# Patient Record
Sex: FEMALE | Race: WHITE | URBAN - METROPOLITAN AREA
[De-identification: names, ages, dates, MRNs, and addresses within clinical notes are randomized per-mention and may not be internally consistent; named-entity substitution may affect disease eponyms.]

---

## 2023-07-31 ENCOUNTER — TRANSCRIBE ORDERS (OUTPATIENT)
Dept: ADMINISTRATIVE | Age: 58
End: 2023-07-31

## 2023-07-31 DIAGNOSIS — C18.2 MALIGNANT NEOPLASM OF ASCENDING COLON (HCC): Primary | ICD-10-CM

## 2023-09-14 ENCOUNTER — HOSPITAL ENCOUNTER (OUTPATIENT)
Dept: CT IMAGING | Age: 58
Discharge: HOME | End: 2023-09-16
Payer: MEDICARE

## 2023-09-14 DIAGNOSIS — C18.9 MALIGNANT NEOPLASM OF COLON, UNSPECIFIED PART OF COLON (HCC): ICD-10-CM

## 2023-09-14 PROCEDURE — 2580000003 HC RX 258: Performed by: RADIOLOGY

## 2023-09-14 PROCEDURE — 74178 CT ABD&PLV WO CNTR FLWD CNTR: CPT

## 2023-09-14 PROCEDURE — 71270 CT THORAX DX C-/C+: CPT

## 2023-09-14 PROCEDURE — 6360000004 HC RX CONTRAST MEDICATION: Performed by: RADIOLOGY

## 2023-09-14 RX ORDER — SODIUM CHLORIDE 0.9 % (FLUSH) 0.9 %
10 SYRINGE (ML) INJECTION
Status: COMPLETED | OUTPATIENT
Start: 2023-09-14 | End: 2023-09-14

## 2023-09-14 RX ADMIN — IOPAMIDOL 18 ML: 755 INJECTION, SOLUTION INTRAVENOUS at 12:14

## 2023-09-14 RX ADMIN — IOPAMIDOL 75 ML: 755 INJECTION, SOLUTION INTRAVENOUS at 12:14

## 2023-09-14 RX ADMIN — Medication 10 ML: at 12:14

## 2023-10-04 ENCOUNTER — TELEPHONE (OUTPATIENT)
Dept: HEMATOLOGY | Age: 58
End: 2023-10-04

## 2023-10-04 NOTE — TELEPHONE ENCOUNTER
We received a referral from [facility], I had Dr Tom Loera review the referral, and we do not see patients for the referral reason. I called [facility] and spoke with Amy Bar to let her know that she can send this referral to Dr Jessenia Chu office and gave her their office number to call.    Electronically signed by Parag Gallagher MA on 10/4/2023 at 10:23 AM

## 2023-10-13 ENCOUNTER — OFFICE VISIT (OUTPATIENT)
Dept: SURGERY | Age: 58
End: 2023-10-13
Payer: MEDICARE

## 2023-10-13 VITALS
SYSTOLIC BLOOD PRESSURE: 85 MMHG | DIASTOLIC BLOOD PRESSURE: 61 MMHG | BODY MASS INDEX: 25.68 KG/M2 | WEIGHT: 136 LBS | HEIGHT: 61 IN | TEMPERATURE: 98 F | HEART RATE: 75 BPM

## 2023-10-13 DIAGNOSIS — K63.2 ENTEROCUTANEOUS FISTULA: Primary | ICD-10-CM

## 2023-10-13 DIAGNOSIS — K50.012 CROHN'S DISEASE OF SMALL INTESTINE WITH INTESTINAL OBSTRUCTION (HCC): ICD-10-CM

## 2023-10-13 PROCEDURE — 4004F PT TOBACCO SCREEN RCVD TLK: CPT | Performed by: STUDENT IN AN ORGANIZED HEALTH CARE EDUCATION/TRAINING PROGRAM

## 2023-10-13 PROCEDURE — G8427 DOCREV CUR MEDS BY ELIG CLIN: HCPCS | Performed by: STUDENT IN AN ORGANIZED HEALTH CARE EDUCATION/TRAINING PROGRAM

## 2023-10-13 PROCEDURE — 99204 OFFICE O/P NEW MOD 45 MIN: CPT | Performed by: STUDENT IN AN ORGANIZED HEALTH CARE EDUCATION/TRAINING PROGRAM

## 2023-10-13 PROCEDURE — G8419 CALC BMI OUT NRM PARAM NOF/U: HCPCS | Performed by: STUDENT IN AN ORGANIZED HEALTH CARE EDUCATION/TRAINING PROGRAM

## 2023-10-13 PROCEDURE — 3017F COLORECTAL CA SCREEN DOC REV: CPT | Performed by: STUDENT IN AN ORGANIZED HEALTH CARE EDUCATION/TRAINING PROGRAM

## 2023-10-13 PROCEDURE — G8484 FLU IMMUNIZE NO ADMIN: HCPCS | Performed by: STUDENT IN AN ORGANIZED HEALTH CARE EDUCATION/TRAINING PROGRAM

## 2023-10-13 ASSESSMENT — ENCOUNTER SYMPTOMS
ABDOMINAL DISTENTION: 0
VOMITING: 0
CHOKING: 0
WHEEZING: 0
ABDOMINAL PAIN: 1
CHEST TIGHTNESS: 0
SHORTNESS OF BREATH: 0
STRIDOR: 0
APNEA: 0
ANAL BLEEDING: 0
DIARRHEA: 0
BLOOD IN STOOL: 0
TROUBLE SWALLOWING: 0
BACK PAIN: 1
CONSTIPATION: 0
COUGH: 0
COLOR CHANGE: 0
NAUSEA: 0

## 2023-10-13 NOTE — PROGRESS NOTES
[facility] Clinic Note    Chief Complaint   Patient presents with    New Patient     Enterocutaneous fistula       PCP: Nahomi Chacko DO    HPI: Madhavi Barrios is a 66 y.o. female who presents in consultation for who present with an enterocutaneous fistula. She has a prolonged history since May 15th. She had an ovarian mass that was removed on 5/15. Two weeks post she came back into the hospital and has septic shock and free air and fluid. She was taken for an ex lap which showed significant amount of succus throughout her abdomen and was found to have small bowel enterotomy and sigmoid colotomy. These were repaired. 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 She has a history of Crohns disease as well and has been off her humera and has not seen GI recently. She would like to get home from her SNF with her TPN but they are having insurance issues. She has a high output fistula based on how much she is changing her dressing. She has an end transverse colotomy which does not procedure anything. She ahs recently been on full liquids. Per the op notes she had her sigmoid repaired along with small bowel resected but due to her right colon having significant damage she has a ileo to transverse colon bypass. The surgeon who performed this is not at the facility anymore either. Past Medical History:   Diagnosis Date    Cancer ([address])     colon    Depression     Fissure, anal     Hernia     Hypertension        Past Surgical History:   Procedure Laterality Date    APPENDECTOMY       SECTION      CHOLECYSTECTOMY      COLON SURGERY      HERNIA REPAIR      OVARY REMOVAL      TUNNELED VENOUS PORT PLACEMENT      also removed       Prior to Admission medications    Medication Sig Start Date End Date Taking?  Authorizing Provider   apixaban (ELIQUIS) 5 MG TABS tablet Take 1 tablet by mouth 2 times daily   Yes Provider, MD Katelyn   metoprolol tartrate

## 2023-12-28 ENCOUNTER — APPOINTMENT (OUTPATIENT)
Dept: CT IMAGING | Age: 58
End: 2023-12-28
Payer: MEDICAID

## 2023-12-28 ENCOUNTER — APPOINTMENT (OUTPATIENT)
Dept: ULTRASOUND IMAGING | Age: 58
End: 2023-12-28
Payer: MEDICAID

## 2023-12-28 ENCOUNTER — APPOINTMENT (OUTPATIENT)
Dept: GENERAL RADIOLOGY | Age: 58
End: 2023-12-28
Payer: MEDICAID

## 2023-12-28 ENCOUNTER — HOSPITAL ENCOUNTER (INPATIENT)
Age: 58
LOS: 8 days | Discharge: OTHER INSTITUTION | End: 2024-01-05
Attending: EMERGENCY MEDICINE | Admitting: INTERNAL MEDICINE
Payer: MEDICAID

## 2023-12-28 DIAGNOSIS — R65.21 SEPTIC SHOCK (HCC): Primary | ICD-10-CM

## 2023-12-28 DIAGNOSIS — R74.01 TRANSAMINITIS: ICD-10-CM

## 2023-12-28 DIAGNOSIS — K50.012 CROHN'S DISEASE OF SMALL INTESTINE WITH INTESTINAL OBSTRUCTION (HCC): ICD-10-CM

## 2023-12-28 DIAGNOSIS — A41.9 SEPTIC SHOCK (HCC): Primary | ICD-10-CM

## 2023-12-28 LAB
ALBUMIN SERPL-MCNC: 3.1 G/DL (ref 3.5–5.2)
ALP SERPL-CCNC: 229 U/L (ref 35–104)
ALT SERPL-CCNC: 134 U/L (ref 0–32)
ANION GAP SERPL CALCULATED.3IONS-SCNC: 14 MMOL/L (ref 7–16)
AST SERPL-CCNC: 149 U/L (ref 0–31)
ATYPICAL LYMPHOCYTE ABSOLUTE COUNT: 0.08 K/UL (ref 0–0.46)
ATYPICAL LYMPHOCYTES: 3 % (ref 0–4)
B PARAP IS1001 DNA NPH QL NAA+NON-PROBE: NOT DETECTED
B PERT DNA SPEC QL NAA+PROBE: NOT DETECTED
BASOPHILS # BLD: 0 K/UL (ref 0–0.2)
BASOPHILS NFR BLD: 0 % (ref 0–2)
BILIRUB DIRECT SERPL-MCNC: 2.6 MG/DL (ref 0–0.3)
BILIRUB SERPL-MCNC: 3.5 MG/DL (ref 0–1.2)
BILIRUB UR QL STRIP: ABNORMAL
BUN SERPL-MCNC: 26 MG/DL (ref 6–20)
C PNEUM DNA NPH QL NAA+NON-PROBE: NOT DETECTED
CALCIUM SERPL-MCNC: 9.3 MG/DL (ref 8.6–10.2)
CHLORIDE SERPL-SCNC: 97 MMOL/L (ref 98–107)
CLARITY UR: CLEAR
CO2 SERPL-SCNC: 21 MMOL/L (ref 22–29)
COLOR UR: YELLOW
COMMENT: ABNORMAL
CREAT SERPL-MCNC: 0.9 MG/DL (ref 0.5–1)
CRP SERPL HS-MCNC: 102 MG/L (ref 0–5)
EOSINOPHIL # BLD: 0 K/UL (ref 0.05–0.5)
EOSINOPHILS RELATIVE PERCENT: 0 % (ref 0–6)
ERYTHROCYTE [DISTWIDTH] IN BLOOD BY AUTOMATED COUNT: 14.3 % (ref 11.5–15)
ERYTHROCYTE [SEDIMENTATION RATE] IN BLOOD BY WESTERGREN METHOD: 18 MM/HR (ref 0–20)
FLUAV RNA NPH QL NAA+NON-PROBE: NOT DETECTED
FLUBV RNA NPH QL NAA+NON-PROBE: NOT DETECTED
GFR SERPL CREATININE-BSD FRML MDRD: >60 ML/MIN/1.73M2
GLUCOSE SERPL-MCNC: 136 MG/DL (ref 74–99)
GLUCOSE UR STRIP-MCNC: NEGATIVE MG/DL
HADV DNA NPH QL NAA+NON-PROBE: NOT DETECTED
HCOV 229E RNA NPH QL NAA+NON-PROBE: NOT DETECTED
HCOV HKU1 RNA NPH QL NAA+NON-PROBE: NOT DETECTED
HCOV NL63 RNA NPH QL NAA+NON-PROBE: NOT DETECTED
HCOV OC43 RNA NPH QL NAA+NON-PROBE: NOT DETECTED
HCT VFR BLD AUTO: 36.1 % (ref 34–48)
HGB BLD-MCNC: 12.1 G/DL (ref 11.5–15.5)
HGB UR QL STRIP.AUTO: NEGATIVE
HMPV RNA NPH QL NAA+NON-PROBE: NOT DETECTED
HPIV1 RNA NPH QL NAA+NON-PROBE: NOT DETECTED
HPIV2 RNA NPH QL NAA+NON-PROBE: NOT DETECTED
HPIV3 RNA NPH QL NAA+NON-PROBE: NOT DETECTED
HPIV4 RNA NPH QL NAA+NON-PROBE: NOT DETECTED
INFLUENZA A BY PCR: NOT DETECTED
INFLUENZA B BY PCR: NOT DETECTED
KETONES UR STRIP-MCNC: NEGATIVE MG/DL
LACTATE BLDV-SCNC: 1.8 MMOL/L (ref 0.5–2.2)
LACTATE BLDV-SCNC: 2.8 MMOL/L (ref 0.5–2.2)
LACTATE BLDV-SCNC: 5.1 MMOL/L (ref 0.5–2.2)
LEUKOCYTE ESTERASE UR QL STRIP: NEGATIVE
LIPASE SERPL-CCNC: 33 U/L (ref 13–60)
LYMPHOCYTES NFR BLD: 0.19 K/UL (ref 1.5–4)
LYMPHOCYTES RELATIVE PERCENT: 6 % (ref 20–42)
M PNEUMO DNA NPH QL NAA+NON-PROBE: NOT DETECTED
MCH RBC QN AUTO: 32.2 PG (ref 26–35)
MCHC RBC AUTO-ENTMCNC: 33.5 G/DL (ref 32–34.5)
MCV RBC AUTO: 96 FL (ref 80–99.9)
MONOCYTES NFR BLD: 0 % (ref 2–12)
MONOCYTES NFR BLD: 0 K/UL (ref 0.1–0.95)
NEUTROPHILS NFR BLD: 91 % (ref 43–80)
NEUTS SEG NFR BLD: 2.83 K/UL (ref 1.8–7.3)
NITRITE UR QL STRIP: NEGATIVE
PH UR STRIP: 5.5 [PH] (ref 5–9)
PLATELET # BLD AUTO: 122 K/UL (ref 130–450)
PMV BLD AUTO: 12.5 FL (ref 7–12)
POTASSIUM SERPL-SCNC: 3.5 MMOL/L (ref 3.5–5)
PROCALCITONIN SERPL-MCNC: 47.11 NG/ML (ref 0–0.08)
PROT SERPL-MCNC: 6.4 G/DL (ref 6.4–8.3)
PROT UR STRIP-MCNC: NEGATIVE MG/DL
RBC # BLD AUTO: 3.76 M/UL (ref 3.5–5.5)
RBC # BLD: ABNORMAL 10*6/UL
RSV RNA NPH QL NAA+NON-PROBE: NOT DETECTED
RV+EV RNA NPH QL NAA+NON-PROBE: NOT DETECTED
SARS-COV-2 RDRP RESP QL NAA+PROBE: NOT DETECTED
SARS-COV-2 RNA NPH QL NAA+NON-PROBE: NOT DETECTED
SODIUM SERPL-SCNC: 132 MMOL/L (ref 132–146)
SP GR UR STRIP: <1.005 (ref 1–1.03)
SPECIMEN DESCRIPTION: NORMAL
SPECIMEN DESCRIPTION: NORMAL
UROBILINOGEN UR STRIP-ACNC: 1 EU/DL (ref 0–1)
WBC # BLD: ABNORMAL 10*3/UL
WBC OTHER # BLD: 3.1 K/UL (ref 4.5–11.5)

## 2023-12-28 PROCEDURE — 2000000000 HC ICU R&B

## 2023-12-28 PROCEDURE — 83690 ASSAY OF LIPASE: CPT

## 2023-12-28 PROCEDURE — 36592 COLLECT BLOOD FROM PICC: CPT

## 2023-12-28 PROCEDURE — 99285 EMERGENCY DEPT VISIT HI MDM: CPT

## 2023-12-28 PROCEDURE — 96375 TX/PRO/DX INJ NEW DRUG ADDON: CPT

## 2023-12-28 PROCEDURE — 71045 X-RAY EXAM CHEST 1 VIEW: CPT

## 2023-12-28 PROCEDURE — 6360000002 HC RX W HCPCS: Performed by: SPECIALIST

## 2023-12-28 PROCEDURE — 02HV33Z INSERTION OF INFUSION DEVICE INTO SUPERIOR VENA CAVA, PERCUTANEOUS APPROACH: ICD-10-PCS | Performed by: SPECIALIST

## 2023-12-28 PROCEDURE — 96366 THER/PROPH/DIAG IV INF ADDON: CPT

## 2023-12-28 PROCEDURE — 96368 THER/DIAG CONCURRENT INF: CPT

## 2023-12-28 PROCEDURE — 87040 BLOOD CULTURE FOR BACTERIA: CPT

## 2023-12-28 PROCEDURE — 87635 SARS-COV-2 COVID-19 AMP PRB: CPT

## 2023-12-28 PROCEDURE — 85025 COMPLETE CBC W/AUTO DIFF WBC: CPT

## 2023-12-28 PROCEDURE — 76705 ECHO EXAM OF ABDOMEN: CPT

## 2023-12-28 PROCEDURE — 2580000003 HC RX 258: Performed by: SPECIALIST

## 2023-12-28 PROCEDURE — 6360000002 HC RX W HCPCS

## 2023-12-28 PROCEDURE — 87081 CULTURE SCREEN ONLY: CPT

## 2023-12-28 PROCEDURE — 2580000003 HC RX 258

## 2023-12-28 PROCEDURE — 85652 RBC SED RATE AUTOMATED: CPT

## 2023-12-28 PROCEDURE — 84145 PROCALCITONIN (PCT): CPT

## 2023-12-28 PROCEDURE — 87077 CULTURE AEROBIC IDENTIFY: CPT

## 2023-12-28 PROCEDURE — 86140 C-REACTIVE PROTEIN: CPT

## 2023-12-28 PROCEDURE — 2580000003 HC RX 258: Performed by: EMERGENCY MEDICINE

## 2023-12-28 PROCEDURE — 82248 BILIRUBIN DIRECT: CPT

## 2023-12-28 PROCEDURE — 96365 THER/PROPH/DIAG IV INF INIT: CPT

## 2023-12-28 PROCEDURE — 81003 URINALYSIS AUTO W/O SCOPE: CPT

## 2023-12-28 PROCEDURE — 87502 INFLUENZA DNA AMP PROBE: CPT

## 2023-12-28 PROCEDURE — 0202U NFCT DS 22 TRGT SARS-COV-2: CPT

## 2023-12-28 PROCEDURE — 87154 CUL TYP ID BLD PTHGN 6+ TRGT: CPT

## 2023-12-28 PROCEDURE — 74177 CT ABD & PELVIS W/CONTRAST: CPT

## 2023-12-28 PROCEDURE — 2580000003 HC RX 258: Performed by: NURSE PRACTITIONER

## 2023-12-28 PROCEDURE — 6370000000 HC RX 637 (ALT 250 FOR IP)

## 2023-12-28 PROCEDURE — 83605 ASSAY OF LACTIC ACID: CPT

## 2023-12-28 PROCEDURE — 82533 TOTAL CORTISOL: CPT

## 2023-12-28 PROCEDURE — 80053 COMPREHEN METABOLIC PANEL: CPT

## 2023-12-28 PROCEDURE — 6360000002 HC RX W HCPCS: Performed by: EMERGENCY MEDICINE

## 2023-12-28 PROCEDURE — 2500000003 HC RX 250 WO HCPCS

## 2023-12-28 PROCEDURE — 6360000004 HC RX CONTRAST MEDICATION: Performed by: RADIOLOGY

## 2023-12-28 RX ORDER — LEVOTHYROXINE SODIUM 0.07 MG/1
75 TABLET ORAL DAILY
COMMUNITY

## 2023-12-28 RX ORDER — ALBUTEROL SULFATE 90 UG/1
2 AEROSOL, METERED RESPIRATORY (INHALATION) EVERY 4 HOURS PRN
COMMUNITY

## 2023-12-28 RX ORDER — LOPERAMIDE HYDROCHLORIDE 2 MG/1
2 CAPSULE ORAL 4 TIMES DAILY
COMMUNITY

## 2023-12-28 RX ORDER — ACETAMINOPHEN 500 MG
1000 TABLET ORAL ONCE
Status: COMPLETED | OUTPATIENT
Start: 2023-12-28 | End: 2023-12-28

## 2023-12-28 RX ORDER — 0.9 % SODIUM CHLORIDE 0.9 %
1000 INTRAVENOUS SOLUTION INTRAVENOUS ONCE
Status: COMPLETED | OUTPATIENT
Start: 2023-12-28 | End: 2023-12-28

## 2023-12-28 RX ORDER — OMEPRAZOLE 20 MG/1
20 CAPSULE, DELAYED RELEASE ORAL 2 TIMES DAILY
COMMUNITY

## 2023-12-28 RX ORDER — ACETAMINOPHEN 500 MG
1000 TABLET ORAL EVERY 6 HOURS PRN
COMMUNITY

## 2023-12-28 RX ORDER — OXYCODONE HYDROCHLORIDE 5 MG/1
5 TABLET ORAL 2 TIMES DAILY
Status: ON HOLD | COMMUNITY
End: 2024-01-04

## 2023-12-28 RX ORDER — ATROPINE SULFATE 0.1 MG/ML
INJECTION INTRAVENOUS
Status: DISPENSED
Start: 2023-12-28 | End: 2023-12-29

## 2023-12-28 RX ORDER — SODIUM CHLORIDE 0.9 % (FLUSH) 0.9 %
5-40 SYRINGE (ML) INJECTION EVERY 12 HOURS SCHEDULED
Status: DISCONTINUED | OUTPATIENT
Start: 2023-12-28 | End: 2024-01-06 | Stop reason: HOSPADM

## 2023-12-28 RX ORDER — 0.9 % SODIUM CHLORIDE 0.9 %
500 INTRAVENOUS SOLUTION INTRAVENOUS ONCE
Status: COMPLETED | OUTPATIENT
Start: 2023-12-28 | End: 2023-12-28

## 2023-12-28 RX ORDER — GLUCAGON 3 MG/1
3 POWDER NASAL PRN
COMMUNITY

## 2023-12-28 RX ORDER — FENTANYL CITRATE 50 UG/ML
25 INJECTION, SOLUTION INTRAMUSCULAR; INTRAVENOUS ONCE
Status: COMPLETED | OUTPATIENT
Start: 2023-12-28 | End: 2023-12-28

## 2023-12-28 RX ORDER — DEXTROSE MONOHYDRATE 25 G/50ML
25 INJECTION, SOLUTION INTRAVENOUS PRN
COMMUNITY

## 2023-12-28 RX ORDER — ONDANSETRON 4 MG/1
4 TABLET, FILM COATED ORAL EVERY 6 HOURS PRN
COMMUNITY

## 2023-12-28 RX ORDER — BUSPIRONE HYDROCHLORIDE 10 MG/1
10 TABLET ORAL 2 TIMES DAILY
COMMUNITY

## 2023-12-28 RX ORDER — MIRTAZAPINE 15 MG/1
15 TABLET, FILM COATED ORAL NIGHTLY
COMMUNITY

## 2023-12-28 RX ORDER — SODIUM CHLORIDE 0.9 % (FLUSH) 0.9 %
5-40 SYRINGE (ML) INJECTION PRN
Status: DISCONTINUED | OUTPATIENT
Start: 2023-12-28 | End: 2024-01-06 | Stop reason: HOSPADM

## 2023-12-28 RX ORDER — SODIUM CHLORIDE 9 MG/ML
INJECTION, SOLUTION INTRAVENOUS CONTINUOUS
Status: DISCONTINUED | OUTPATIENT
Start: 2023-12-28 | End: 2023-12-29

## 2023-12-28 RX ORDER — SODIUM CHLORIDE 9 MG/ML
INJECTION, SOLUTION INTRAVENOUS PRN
Status: DISCONTINUED | OUTPATIENT
Start: 2023-12-28 | End: 2024-01-06 | Stop reason: HOSPADM

## 2023-12-28 RX ORDER — INSULIN ASPART 100 [IU]/ML
0-8 INJECTION, SOLUTION INTRAVENOUS; SUBCUTANEOUS 2 TIMES DAILY
COMMUNITY

## 2023-12-28 RX ADMIN — VANCOMYCIN HYDROCHLORIDE 1500 MG: 10 INJECTION, POWDER, LYOPHILIZED, FOR SOLUTION INTRAVENOUS at 14:39

## 2023-12-28 RX ADMIN — PIPERACILLIN AND TAZOBACTAM 4500 MG: 4; .5 INJECTION, POWDER, LYOPHILIZED, FOR SOLUTION INTRAVENOUS at 16:45

## 2023-12-28 RX ADMIN — SODIUM CHLORIDE: 9 INJECTION, SOLUTION INTRAVENOUS at 20:21

## 2023-12-28 RX ADMIN — NOREPINEPHRINE BITARTRATE 5 MCG/MIN: 1 SOLUTION INTRAVENOUS at 13:31

## 2023-12-28 RX ADMIN — SODIUM CHLORIDE 1000 ML: 9 INJECTION, SOLUTION INTRAVENOUS at 10:29

## 2023-12-28 RX ADMIN — SODIUM CHLORIDE 1000 ML: 9 INJECTION, SOLUTION INTRAVENOUS at 20:36

## 2023-12-28 RX ADMIN — SODIUM CHLORIDE 1000 ML: 9 INJECTION, SOLUTION INTRAVENOUS at 21:53

## 2023-12-28 RX ADMIN — SODIUM CHLORIDE 1000 ML: 9 INJECTION, SOLUTION INTRAVENOUS at 10:38

## 2023-12-28 RX ADMIN — PIPERACILLIN AND TAZOBACTAM 4500 MG: 4; .5 INJECTION, POWDER, FOR SOLUTION INTRAVENOUS at 14:00

## 2023-12-28 RX ADMIN — IOPAMIDOL 75 ML: 755 INJECTION, SOLUTION INTRAVENOUS at 12:22

## 2023-12-28 RX ADMIN — FENTANYL CITRATE 25 MCG: 50 INJECTION, SOLUTION INTRAMUSCULAR; INTRAVENOUS at 14:46

## 2023-12-28 RX ADMIN — ACETAMINOPHEN 1000 MG: 500 TABLET ORAL at 10:30

## 2023-12-28 ASSESSMENT — LIFESTYLE VARIABLES
HOW MANY STANDARD DRINKS CONTAINING ALCOHOL DO YOU HAVE ON A TYPICAL DAY: PATIENT DOES NOT DRINK
HOW OFTEN DO YOU HAVE A DRINK CONTAINING ALCOHOL: NEVER

## 2023-12-28 ASSESSMENT — PAIN SCALES - GENERAL
PAINLEVEL_OUTOF10: 0
PAINLEVEL_OUTOF10: 6
PAINLEVEL_OUTOF10: 0

## 2023-12-28 NOTE — CONSULTS
GENERAL SURGERY  CONSULT NOTE  2023    Physician Consulted: Dr. Hunt  Reason for Consult: Enterocutaneous fistula, fever  Referring Physician: Dr. Tushar ROSENBAUM  Maryam Santana is a 58 y.o. female with history of Crohn's previously on Humira, complex past abdominal surgical history including prior sigmoidectomy, cholecystectomy, prior , bilateral oophorectomy with subsequent small bowel injury requiring takeback and small bowel resection with development of enterocutaneous fistula currently on TPN/clear liquid diet at [facility] who presented to the ED secondary to 1 day of worsening fevers/chills.  Patient states that she noted subjective fever/chills during her TPN dose on .  Patient states that she had recurrent fevers/chills which were worsened by TPN today which prompted her to come to the ED from her nursing facility.  Patient states that she has had associated nausea with 1 episode of emesis of just clear liquid she has drank.  Patient denies any change in her colostomy output.  Patient states that her enterocutaneous fistula output has remained the same despite cutting back to clear liquids.  Patient states that initially the facility was able to pouch her midline fistula but states that recently they have been unable to and have been trying to control it with a dry dressing.  Initial evaluation in the ED demonstrated, BMP with creatinine 0.9, lactic acidosis of 5.1, transaminitis with AST//134, hyperbilirubinemia 3.5, lipase 33, WBC low at 3.1, normal hemoglobin 12.1, thrombocytopenia 122.  Patient underwent right upper quadrant ultrasound which demonstrated absence of gallbladder consistent with cholecystectomy.  Chest x-ray negative for acute process, patient underwent CT of the abdomen pelvis with IV contrast with read currently pending.  Patient was noted to be hypotensive upon arrival to the ED.  Patient was given 2 L IVF bolus upon arrival.  Patient was started on Levophed.   12/28/23 1412   BP: (!) 83/58   Pulse: 81   Resp: 18   Temp:    SpO2: 96%       General Appearance: Awake, alert, ill-appearing  Skin: No rashes/lesions noted  Head/face:  NCAT  Eyes: Scleral icterus noted  Lungs: Normal chest wall expansion bilaterally.  Tolerating room air.  Heart:  Heart regular rate and rhythm  Abdomen: Soft, nondistended, nontender.  Left upper quadrant colostomy with stoma pink/well-perfused with liquid yellow colostomy output noted.  Similar appearing midline fistula output.  Fistulas over the midline x 2 noted supraumbilical with surrounding excoriation.  Extremities: trace pedal edema    LABS:    CBC  Recent Labs     12/28/23  1025   WBC 3.1*   HGB 12.1   HCT 36.1   *     BMP  Recent Labs     12/28/23  1025      K 3.5   CL 97*   CO2 21*   BUN 26*   CREATININE 0.9   CALCIUM 9.3     Liver Function  Recent Labs     12/28/23  1025   LIPASE 33   BILITOT 3.5*   *   *   ALKPHOS 229*   PROT 6.4   LABALBU 3.1*     No results for input(s): \"LACTATE\" in the last 72 hours.  No results for input(s): \"INR\", \"PTT\" in the last 72 hours.    Invalid input(s): \"PT\"    RADIOLOGY    US GALLBLADDER RUQ    Result Date: 12/28/2023  EXAMINATION: RIGHT UPPER QUADRANT ULTRASOUND 12/28/2023 12:05 pm COMPARISON: None. HISTORY: ORDERING SYSTEM PROVIDED HISTORY: transaminitis, elevated bilirubin TECHNOLOGIST PROVIDED HISTORY: Reason for exam:->transaminitis, elevated bilirubin What reading provider will be dictating this exam?->CRC FINDINGS: LIVER:  The liver is mildly enlarged with normal echogenicity without evidence of intrahepatic biliary ductal dilatation. BILIARY SYSTEM:  Gallbladder is surgically absent. Common bile duct is within normal limits measuring 5.3 mm. RIGHT KIDNEY: The right kidney is grossly unremarkable without evidence of hydronephrosis. PANCREAS:  Visualized portions of the pancreas are unremarkable. OTHER: No evidence of right upper quadrant ascites.     Mild

## 2023-12-28 NOTE — ED NOTES
Pt. Has large amount of green bile leaking out of abd. Fistula on abd. Pt. Complaining that bile is burning skin. Area cleaned and absorbant material placed over wound.

## 2023-12-28 NOTE — CONSULTS
Critical Care Admit/Consult Note         Patient - Maryam Santana   MRN -  [id number]   [id number]   - 1965      Date of Admission -  2023  9:52 AM  Date of evaluation -  2023   Hospital Day - 0            ADMIT/CONSULT DETAILS     Reason for Admit/Consult   Septic shock    Consulting Service/Physician   Consulting - Rah Porter DO  Primary Care Physician - Matthew Russell MD         HPI   The patient is a 58 y.o. female from nursing facility with PMHx Crohn's disease on Humira, enteroccutaneous fistula, DMII, complex past abdominal surgical history who presents to the ED with fevers, chills, nausea along with abdominal pain and pain near her PICC line insertion site.     Patient was hospitalized May 2023 for several complications related to partial hysterectomy and returned later for postsurgical abscess.  Patient had underwent abdominal laparotomy with bowel resection end of May.    Patient presented to the ER febrile Tmax 22.6 °F, hypotensive with a BP of 70/40.  Labs significant for WBC 3.1 lactic 5.1, procalcitonin 47.1, , , , total bili 3.5, direct bili 2.6.  Ultrasound gallbladder showed mild hepatomegaly, status postcholecystectomy.  Patient was given 2 L 0.9% bolus, vancomycin and Zosyn.  Levophed was started at 11 mcg.      Past Medical History         Diagnosis Date    Cancer (HCC)     colon    Depression     Fissure, anal     Hernia     Hypertension         Past Surgical History           Procedure Laterality Date    APPENDECTOMY       SECTION      CHOLECYSTECTOMY      COLON SURGERY      HERNIA REPAIR      OVARY REMOVAL      TUNNELED VENOUS PORT PLACEMENT      also removed       Immunization History   Administered Date(s) Administered    Influenza Virus Vaccine 10/05/2015    Pneumococcal, PPSV23, PNEUMOVAX 23, (age 2y+), SC/IM, 0.5mL 10/05/2015       Current Medications   Current Medications    piperacillin-tazobactam  4,500 mg  10:25 AM    EOSABS 0.00 12/28/2023 10:25 AM    BASOSABS 0.00 12/28/2023 10:25 AM       BMP   Lab Results   Component Value Date/Time     12/28/2023 10:25 AM    K 3.5 12/28/2023 10:25 AM    CL 97 12/28/2023 10:25 AM    CO2 21 12/28/2023 10:25 AM    BUN 26 12/28/2023 10:25 AM    CREATININE 0.9 12/28/2023 10:25 AM    GLUCOSE 136 12/28/2023 10:25 AM    CALCIUM 9.3 12/28/2023 10:25 AM       LFTS  Lab Results   Component Value Date/Time    ALKPHOS 229 12/28/2023 10:25 AM     12/28/2023 10:25 AM     12/28/2023 10:25 AM    PROT 6.4 12/28/2023 10:25 AM    BILITOT 3.5 12/28/2023 10:25 AM    BILIDIR 2.6 12/28/2023 10:30 AM    IBILI 0.1 10/06/2015 06:07 AM    LABALBU 3.1 12/28/2023 10:25 AM       INR  No results for input(s): \"PROTIME\", \"INR\" in the last 72 hours.    APTT  No results for input(s): \"APTT\" in the last 72 hours.    Lactic Acid  Lab Results   Component Value Date/Time    LACTA 5.1 12/28/2023 10:25 AM    LACTA 1.1 09/30/2015 09:18 AM    LACTA 2.0 09/29/2015 05:24 PM        BNP   No results for input(s): \"BNP\" in the last 72 hours.     Cultures     No results for input(s): \"BC\" in the last 72 hours.  No results for input(s): \"BLOODCULT2\" in the last 72 hours.    No results for input(s): \"LABURIN\" in the last 72 hours.    Radiology   CXR 12/28/23  No acute process.       CT abdomen/pelvis W IV (12/28/2023)  IMPRESSION:  1. Redemonstration of postsurgical changes related to prior bowel resection  with left-sided ostomy.  2. Small loculated fluid and gas collection located in ventral abdominal  subcutaneous fat along left aspect of surgical scar.  This lesion is slightly  smaller compared to prior from September 14, 2023, yet may indicate residual  or recurrent abscess or fistula.  Repeat CT with oral contrast may be helpful  for further evaluation.  Clinical correlation recommended.  3. Diverticulosis.  4. No acute process in the abdomen or pelvis.    ASSESSMENT    Principal Problem:    Septic shock

## 2023-12-28 NOTE — ED PROVIDER NOTES
Department of Emergency Medicine     Written by: Tushar Barroso DO  Patient Name: Maryam Santana  Admit Date: 2023  9:52 AM  MRN: [id number]                   : 1965      HPI  Chief Complaint   Patient presents with    Fever     This is a 58-year-old female with past medical history of Crohn's disease, enterocutaneous fistula, depression, colon cancer, hypertension who presents to the emergency department today complaining of fever.  Patient currently lives at a nursing facility where they states she had a fever of 101 °F yesterday.  Patient receives TPN patient following her extensive history of intestinal and stomach issues.  She states yesterday when they gave her her TPN, she became very cold and got chills.  This is when her temperature was measured at 101.  States she did vomit 1 time after receiving TPN yesterday.  She then started to feel a bit better.  This morning when she got her TPN, she got the cold feeling again however did not throw up.  Nursing facility sent her in for evaluation.  Patient denies any lightheadedness or dizziness, fever or chills, chest pain, shortness of breath, hematuria or dysuria, constipation or diarrhea.    Nursing notes were all reviewed and agreed with or any disagreements were addressed in the HPI.    Review of systems:    Pertinent positives and negatives mentioned in the HPI/MDM.     Physical Exam  Constitutional:       General: She is not in acute distress.  HENT:      Head: Normocephalic and atraumatic.   Eyes:      Extraocular Movements: Extraocular movements intact.      Pupils: Pupils are equal, round, and reactive to light.   Cardiovascular:      Rate and Rhythm: Normal rate and regular rhythm.   Pulmonary:      Effort: Pulmonary effort is normal.      Breath sounds: Normal breath sounds. No stridor. No wheezing, rhonchi or rales.   Abdominal:      General: Abdomen is flat. There is no distension.      Palpations: Abdomen is soft.      Tenderness: There  Determinants: Patient has good medical compliance and fluency as well as established follow-up with family physician.    ED Course as of 12/28/23 1702   Thu Dec 28, 2023   1051 SEP-1 CORE MEASURE DATA      Sepsis Criteria   Severe Sepsis Criteria   Septic Shock Criteria      Must meet 2:    [x ]Temp >100.9 F (38.3 C) or < 96.8 F (36 C)  [ ]HR > 90  [x ]RR > 20  [ x]WBC > 12 or < 4 or 10% bands    AND:    [x ] Infection Confirmed or Suspected.     Must meet 1:    [ ]Lactate > 2       or   [x ]Signs of Organ Dysfunction:    - SBP < 90 or MAP < 65  -Creatinine > 2 or increased from baseline  -Urine Output < 0.5 ml/kg/hr  -Bilirubin > 2  -INR > 1.5 (not anticoagulated)  -Platelets < 100,000  -Acute Respiratory Failure as evidenced by new need for NIPPV or mechanical ventilation   Must meet 1:    [ ]Lactate > 4        or   [ ]SBP < 90 or MAP < 65 for at least two readings in the first hour after fluid bolus administration    [ ]Vasopressors initiated (if hypotension persists after fluid resuscitation)  @IPVITALS([id number]:1::1:0)@  @LABRCNT(WBC:3,LACTA:3,LACTACIDWB:3,CREATININE:3,BILITOT:3,INR:3,PLT:3)@    Severe sepsis identified date: 12/28 time: 10:40AM    Fluid Resuscitation Rationale: at least 30mL/kg based on entered actual weight at time of triage    Repeat lactate level: ordered and pending at this time    Reassessment Exam: Not applicable. Patient does not have septic shock.   [CF]      ED Course User Index  [CF] Ace Bailey DO         ------------------------- NURSING NOTES AND VITALS REVIEWED ---------------------------  Date / Time Roomed:  12/28/2023  9:52 AM  ED Bed Assignment:  01/01    The nursing notes within the ED encounter and vital signs as below have been reviewed.   Patient Vitals for the past 24 hrs:   BP Temp Temp src Pulse Resp SpO2 Height Weight   12/28/23 1657 (!) 80/59 -- -- 73 18 100 % -- --   12/28/23 1652 (!) 88/61 -- -- 79 30 99 % -- --   12/28/23 1647 (!) 89/70 -- -- 76 22 99 %

## 2023-12-28 NOTE — CONSULTS
[facility]  [facility]  Consultation Note     Admit Date: 2023  9:52 AM    Reason for Consult:   Concern for infected PICC line    Attending Physician:  Ace Bailey DO    HISTORY OF PRESENT ILLNESS:             The history is obtained from extensive review of available past medical records. The patient is a 58 y.o. female who is unknown to the ID service.  The patient has a history of Crohn disease and is currently on Humira..  She has had surgeries.  She has a history of an enterocutaneous fistula and is currently on TPN.  She presented to the ED at [facility] on 2023 with fevers and chills.  On presentation he had a temperature 102.6 °F and was hypotensive. Rapid SARS-CoV-2 and influenza were negative.  White count is 3.1.  ALT was 134 and AST is 149.  Bilirubin is 3.5.    Past Medical History:        Diagnosis Date    Cancer (HCC)     colon    Depression     Fissure, anal     Hernia     Hypertension      Past Surgical History:        Procedure Laterality Date    APPENDECTOMY       SECTION      CHOLECYSTECTOMY      COLON SURGERY      HERNIA REPAIR      OVARY REMOVAL      TUNNELED VENOUS PORT PLACEMENT      also removed     Current Medications:   Scheduled Meds:   vancomycin  25 mg/kg IntraVENous Once     Continuous Infusions:   norepinephrine 9 mcg/min (23 1441)     PRN Meds:    Allergies:  Dilaudid [hydromorphone hcl], Cocoa, Lactose, Lyrica [pregabalin], Phenergan [promethazine hcl], and Phenytoin sodium extended    Social History:   Social History     Socioeconomic History    Marital status:    Tobacco Use    Smoking status: Some Days   Substance and Sexual Activity    Alcohol use: No    Drug use: No     Social Determinants of Health     Food Insecurity: No Food Insecurity (2023)    Hunger Vital Sign     Worried About Running Out of Food in the Last Year: Never true     Ran Out of Food in the Last Year: Never true

## 2023-12-28 NOTE — PROGRESS NOTES
Database initiated. Patient is A&O comes in from [place]. States she is independent. Has a Picc line in her right arm for TPN. Has a colostomy which she says has increased and turned to watery output. She wants to go home with  after discharge and not back to this facility.

## 2023-12-29 ENCOUNTER — APPOINTMENT (OUTPATIENT)
Dept: ULTRASOUND IMAGING | Age: 58
End: 2023-12-29
Payer: MEDICAID

## 2023-12-29 ENCOUNTER — APPOINTMENT (OUTPATIENT)
Dept: GENERAL RADIOLOGY | Age: 58
End: 2023-12-29
Payer: MEDICAID

## 2023-12-29 PROBLEM — K63.2 ENTEROCUTANEOUS FISTULA: Status: ACTIVE | Noted: 2023-12-29

## 2023-12-29 LAB
ALBUMIN SERPL-MCNC: 2.5 G/DL (ref 3.5–5.2)
ALP SERPL-CCNC: 127 U/L (ref 35–104)
ALT SERPL-CCNC: 100 U/L (ref 0–32)
ANION GAP SERPL CALCULATED.3IONS-SCNC: 8 MMOL/L (ref 7–16)
AST SERPL-CCNC: 142 U/L (ref 0–31)
ATYPICAL LYMPHOCYTE ABSOLUTE COUNT: 0.14 K/UL (ref 0–0.46)
ATYPICAL LYMPHOCYTES: 2 % (ref 0–4)
BASOPHILS # BLD: 0 K/UL (ref 0–0.2)
BASOPHILS NFR BLD: 0 % (ref 0–2)
BILIRUB DIRECT SERPL-MCNC: 3.1 MG/DL (ref 0–0.3)
BILIRUB INDIRECT SERPL-MCNC: 0.5 MG/DL (ref 0–1)
BILIRUB SERPL-MCNC: 3.6 MG/DL (ref 0–1.2)
BILIRUB SERPL-MCNC: 3.6 MG/DL (ref 0–1.2)
BUN SERPL-MCNC: 17 MG/DL (ref 6–20)
CALCIUM SERPL-MCNC: 7.8 MG/DL (ref 8.6–10.2)
CEA SERPL-MCNC: 1.6 NG/ML (ref 0–5.2)
CHLORIDE SERPL-SCNC: 113 MMOL/L (ref 98–107)
CO2 SERPL-SCNC: 19 MMOL/L (ref 22–29)
CORTIS SERPL-MCNC: 18.3 UG/DL (ref 2.7–18.4)
CREAT SERPL-MCNC: 0.7 MG/DL (ref 0.5–1)
EOSINOPHIL # BLD: 0.07 K/UL (ref 0.05–0.5)
EOSINOPHILS RELATIVE PERCENT: 1 % (ref 0–6)
ERYTHROCYTE [DISTWIDTH] IN BLOOD BY AUTOMATED COUNT: 14.7 % (ref 11.5–15)
FERRITIN SERPL-MCNC: 2336 NG/ML
GFR SERPL CREATININE-BSD FRML MDRD: >60 ML/MIN/1.73M2
GLUCOSE SERPL-MCNC: 80 MG/DL (ref 74–99)
HCT VFR BLD AUTO: 30 % (ref 34–48)
HGB BLD-MCNC: 9.9 G/DL (ref 11.5–15.5)
INR PPP: 1.2
IRON SATN MFR SERPL: ABNORMAL % (ref 15–50)
IRON SERPL-MCNC: 16 UG/DL (ref 37–145)
LACTATE BLDV-SCNC: 1.1 MMOL/L (ref 0.5–2.2)
LYMPHOCYTES NFR BLD: 0.57 K/UL (ref 1.5–4)
LYMPHOCYTES RELATIVE PERCENT: 7 % (ref 20–42)
MAGNESIUM SERPL-MCNC: 1.6 MG/DL (ref 1.6–2.6)
MCH RBC QN AUTO: 32.2 PG (ref 26–35)
MCHC RBC AUTO-ENTMCNC: 33 G/DL (ref 32–34.5)
MCV RBC AUTO: 97.7 FL (ref 80–99.9)
MONOCYTES NFR BLD: 0.07 K/UL (ref 0.1–0.95)
MONOCYTES NFR BLD: 1 % (ref 2–12)
NEUTROPHILS NFR BLD: 89 % (ref 43–80)
NEUTS SEG NFR BLD: 7.24 K/UL (ref 1.8–7.3)
PHOSPHATE SERPL-MCNC: 2.3 MG/DL (ref 2.5–4.5)
PLATELET # BLD AUTO: 107 K/UL (ref 130–450)
PMV BLD AUTO: 12.3 FL (ref 7–12)
POTASSIUM SERPL-SCNC: 3.5 MMOL/L (ref 3.5–5)
PROT SERPL-MCNC: 5 G/DL (ref 6.4–8.3)
PROTHROMBIN TIME: 13.7 SEC (ref 9.3–12.4)
RBC # BLD AUTO: 3.07 M/UL (ref 3.5–5.5)
RBC # BLD: ABNORMAL 10*6/UL
SODIUM SERPL-SCNC: 140 MMOL/L (ref 132–146)
T4 SERPL-MCNC: 4.3 UG/DL (ref 4.5–11.7)
TIBC SERPL-MCNC: 117 UG/DL (ref 250–450)
TSH SERPL DL<=0.05 MIU/L-ACNC: 5.51 UIU/ML (ref 0.27–4.2)
WBC OTHER # BLD: 8.1 K/UL (ref 4.5–11.5)

## 2023-12-29 PROCEDURE — 2580000003 HC RX 258: Performed by: INTERNAL MEDICINE

## 2023-12-29 PROCEDURE — 2580000003 HC RX 258: Performed by: NURSE PRACTITIONER

## 2023-12-29 PROCEDURE — 71045 X-RAY EXAM CHEST 1 VIEW: CPT

## 2023-12-29 PROCEDURE — 84450 TRANSFERASE (AST) (SGOT): CPT

## 2023-12-29 PROCEDURE — 84520 ASSAY OF UREA NITROGEN: CPT

## 2023-12-29 PROCEDURE — 83883 ASSAY NEPHELOMETRY NOT SPEC: CPT

## 2023-12-29 PROCEDURE — 82977 ASSAY OF GGT: CPT

## 2023-12-29 PROCEDURE — 6360000002 HC RX W HCPCS: Performed by: INTERNAL MEDICINE

## 2023-12-29 PROCEDURE — 84100 ASSAY OF PHOSPHORUS: CPT

## 2023-12-29 PROCEDURE — 2580000003 HC RX 258: Performed by: SPECIALIST

## 2023-12-29 PROCEDURE — 82103 ALPHA-1-ANTITRYPSIN TOTAL: CPT

## 2023-12-29 PROCEDURE — 87040 BLOOD CULTURE FOR BACTERIA: CPT

## 2023-12-29 PROCEDURE — 6360000002 HC RX W HCPCS: Performed by: SPECIALIST

## 2023-12-29 PROCEDURE — 80074 ACUTE HEPATITIS PANEL: CPT

## 2023-12-29 PROCEDURE — 83735 ASSAY OF MAGNESIUM: CPT

## 2023-12-29 PROCEDURE — 86255 FLUORESCENT ANTIBODY SCREEN: CPT

## 2023-12-29 PROCEDURE — 84443 ASSAY THYROID STIM HORMONE: CPT

## 2023-12-29 PROCEDURE — 6360000002 HC RX W HCPCS: Performed by: NURSE PRACTITIONER

## 2023-12-29 PROCEDURE — 82728 ASSAY OF FERRITIN: CPT

## 2023-12-29 PROCEDURE — 84436 ASSAY OF TOTAL THYROXINE: CPT

## 2023-12-29 PROCEDURE — 80053 COMPREHEN METABOLIC PANEL: CPT

## 2023-12-29 PROCEDURE — A4216 STERILE WATER/SALINE, 10 ML: HCPCS

## 2023-12-29 PROCEDURE — 87071 CULTURE AEROBIC QUANT OTHER: CPT

## 2023-12-29 PROCEDURE — 36556 INSERT NON-TUNNEL CV CATH: CPT

## 2023-12-29 PROCEDURE — 86039 ANTINUCLEAR ANTIBODIES (ANA): CPT

## 2023-12-29 PROCEDURE — 82248 BILIRUBIN DIRECT: CPT

## 2023-12-29 PROCEDURE — 83605 ASSAY OF LACTIC ACID: CPT

## 2023-12-29 PROCEDURE — 82784 ASSAY IGA/IGD/IGG/IGM EACH: CPT

## 2023-12-29 PROCEDURE — 84460 ALANINE AMINO (ALT) (SGPT): CPT

## 2023-12-29 PROCEDURE — 2500000003 HC RX 250 WO HCPCS: Performed by: NURSE PRACTITIONER

## 2023-12-29 PROCEDURE — 2580000003 HC RX 258

## 2023-12-29 PROCEDURE — 2000000000 HC ICU R&B

## 2023-12-29 PROCEDURE — 6360000002 HC RX W HCPCS

## 2023-12-29 PROCEDURE — 85610 PROTHROMBIN TIME: CPT

## 2023-12-29 PROCEDURE — C9113 INJ PANTOPRAZOLE SODIUM, VIA: HCPCS

## 2023-12-29 PROCEDURE — 82105 ALPHA-FETOPROTEIN SERUM: CPT

## 2023-12-29 PROCEDURE — 36592 COLLECT BLOOD FROM PICC: CPT

## 2023-12-29 PROCEDURE — 87077 CULTURE AEROBIC IDENTIFY: CPT

## 2023-12-29 PROCEDURE — 86038 ANTINUCLEAR ANTIBODIES: CPT

## 2023-12-29 PROCEDURE — 36415 COLL VENOUS BLD VENIPUNCTURE: CPT

## 2023-12-29 PROCEDURE — 82378 CARCINOEMBRYONIC ANTIGEN: CPT

## 2023-12-29 PROCEDURE — 2500000003 HC RX 250 WO HCPCS: Performed by: INTERNAL MEDICINE

## 2023-12-29 PROCEDURE — 85025 COMPLETE CBC W/AUTO DIFF WBC: CPT

## 2023-12-29 PROCEDURE — 86403 PARTICLE AGGLUT ANTBDY SCRN: CPT

## 2023-12-29 PROCEDURE — 83550 IRON BINDING TEST: CPT

## 2023-12-29 PROCEDURE — 83540 ASSAY OF IRON: CPT

## 2023-12-29 RX ORDER — MAGNESIUM SULFATE IN WATER 40 MG/ML
2000 INJECTION, SOLUTION INTRAVENOUS ONCE
Status: COMPLETED | OUTPATIENT
Start: 2023-12-29 | End: 2023-12-29

## 2023-12-29 RX ORDER — SODIUM CHLORIDE, SODIUM LACTATE, POTASSIUM CHLORIDE, CALCIUM CHLORIDE 600; 310; 30; 20 MG/100ML; MG/100ML; MG/100ML; MG/100ML
INJECTION, SOLUTION INTRAVENOUS CONTINUOUS
Status: DISCONTINUED | OUTPATIENT
Start: 2023-12-29 | End: 2024-01-06 | Stop reason: HOSPADM

## 2023-12-29 RX ORDER — FENTANYL CITRATE 50 UG/ML
50 INJECTION, SOLUTION INTRAMUSCULAR; INTRAVENOUS ONCE
Status: COMPLETED | OUTPATIENT
Start: 2023-12-29 | End: 2023-12-29

## 2023-12-29 RX ORDER — POTASSIUM CHLORIDE 7.45 MG/ML
10 INJECTION INTRAVENOUS
Status: COMPLETED | OUTPATIENT
Start: 2023-12-29 | End: 2023-12-29

## 2023-12-29 RX ORDER — ENOXAPARIN SODIUM 100 MG/ML
40 INJECTION SUBCUTANEOUS DAILY
Status: DISCONTINUED | OUTPATIENT
Start: 2023-12-29 | End: 2024-01-05

## 2023-12-29 RX ADMIN — SODIUM CHLORIDE, POTASSIUM CHLORIDE, SODIUM LACTATE AND CALCIUM CHLORIDE: 600; 310; 30; 20 INJECTION, SOLUTION INTRAVENOUS at 20:40

## 2023-12-29 RX ADMIN — POTASSIUM CHLORIDE: 2 INJECTION, SOLUTION, CONCENTRATE INTRAVENOUS at 17:59

## 2023-12-29 RX ADMIN — SODIUM CHLORIDE, POTASSIUM CHLORIDE, SODIUM LACTATE AND CALCIUM CHLORIDE: 600; 310; 30; 20 INJECTION, SOLUTION INTRAVENOUS at 13:36

## 2023-12-29 RX ADMIN — PIPERACILLIN AND TAZOBACTAM 4500 MG: 4; .5 INJECTION, POWDER, LYOPHILIZED, FOR SOLUTION INTRAVENOUS at 00:50

## 2023-12-29 RX ADMIN — SOYBEAN OIL 250 ML: 20 INJECTION, SOLUTION INTRAVENOUS at 17:59

## 2023-12-29 RX ADMIN — PIPERACILLIN AND TAZOBACTAM 4500 MG: 4; .5 INJECTION, POWDER, LYOPHILIZED, FOR SOLUTION INTRAVENOUS at 08:37

## 2023-12-29 RX ADMIN — POTASSIUM CHLORIDE 10 MEQ: 7.46 INJECTION, SOLUTION INTRAVENOUS at 07:40

## 2023-12-29 RX ADMIN — MAGNESIUM SULFATE HEPTAHYDRATE 2000 MG: 40 INJECTION, SOLUTION INTRAVENOUS at 07:44

## 2023-12-29 RX ADMIN — ENOXAPARIN SODIUM 40 MG: 100 INJECTION SUBCUTANEOUS at 14:57

## 2023-12-29 RX ADMIN — SODIUM CHLORIDE, PRESERVATIVE FREE 40 MG: 5 INJECTION INTRAVENOUS at 14:57

## 2023-12-29 RX ADMIN — PIPERACILLIN AND TAZOBACTAM 4500 MG: 4; .5 INJECTION, POWDER, LYOPHILIZED, FOR SOLUTION INTRAVENOUS at 17:02

## 2023-12-29 RX ADMIN — VANCOMYCIN HYDROCHLORIDE 750 MG: 5 INJECTION, POWDER, LYOPHILIZED, FOR SOLUTION INTRAVENOUS at 02:40

## 2023-12-29 RX ADMIN — SODIUM CHLORIDE: 9 INJECTION, SOLUTION INTRAVENOUS at 02:38

## 2023-12-29 RX ADMIN — FENTANYL CITRATE 50 MCG: 50 INJECTION, SOLUTION INTRAMUSCULAR; INTRAVENOUS at 17:04

## 2023-12-29 RX ADMIN — Medication 10 ML: at 08:31

## 2023-12-29 RX ADMIN — POTASSIUM CHLORIDE 10 MEQ: 7.46 INJECTION, SOLUTION INTRAVENOUS at 08:29

## 2023-12-29 RX ADMIN — POTASSIUM PHOSPHATE, MONOBASIC POTASSIUM PHOSPHATE, DIBASIC 10 MMOL: 224; 236 INJECTION, SOLUTION, CONCENTRATE INTRAVENOUS at 08:28

## 2023-12-29 ASSESSMENT — PAIN SCALES - GENERAL
PAINLEVEL_OUTOF10: 7
PAINLEVEL_OUTOF10: 0
PAINLEVEL_OUTOF10: 4
PAINLEVEL_OUTOF10: 0
PAINLEVEL_OUTOF10: 4
PAINLEVEL_OUTOF10: 4

## 2023-12-29 ASSESSMENT — PAIN DESCRIPTION - LOCATION: LOCATION: ABDOMEN;NECK

## 2023-12-29 NOTE — PROGRESS NOTES
GENERAL SURGERY  DAILY PROGRESS NOTE    Patient's Name/Date of Birth: Maryam Santana / 1965    Date: 2023     Chief Complaint   Patient presents with    Fever        Subjective:    States her pain is improving.  Denies any nausea/vomiting.  Not feeling hungry, but would like some ice chips.     No recorded output for fistula in chart, minimal drainage of fistula noted on exam.     Objective:  Last 24Hrs  Temp  Av.4 °F (37.4 °C)  Min: 98.2 °F (36.8 °C)  Max: 102.6 °F (39.2 °C)  Resp  Av.6  Min: 13  Max: 31  Pulse  Av.1  Min: 40  Max: 93  Systolic (24hrs), Av , Min:62 , Max:176     Diastolic (24hrs), Av, Min:40, Max:121    SpO2  Av.7 %  Min: 95 %  Max: 100 %    I/O last 3 completed shifts:  In: 2656.4 [I.V.:472; IV Piggyback:2184.4]  Out: 700 [Urine:600; Stool:100]      General: Alert and oriented x4  Cardiovascular: Warm throughout, no edema  Respiratory: no respiratory distress, equal chest rise  Abdomen: soft, Soft, nondistended, nontender. Left upper quadrant colostomy with stoma pink/well-perfused. colostomy output noted.  Similar appearing midline fistula output.  Fistulas over the midline x 2 noted supraumbilical with surrounding excoriation.   Skin: no obvious rashes or lesions appreciated, no jaundice  Extremities: atraumatic, no focal motor deficits, no open wounds      CBC  Recent Labs     23  1025 23  0410   WBC 3.1* 8.1   RBC 3.76 3.07*   HGB 12.1 9.9*   HCT 36.1 30.0*   MCV 96.0 97.7   MCH 32.2 32.2   MCHC 33.5 33.0   RDW 14.3 14.7   * 107*   MPV 12.5* 12.3*       CMP  Recent Labs     23  1025 23  0410    140   K 3.5 3.5   CL 97* 113*   CO2 21* 19*   BUN 26* 17   CREATININE 0.9 0.7   GLUCOSE 136* 80   CALCIUM 9.3 7.8*   PROT 6.4 5.0*   LABALBU 3.1* 2.5*   BILITOT 3.5* 3.6*   ALKPHOS 229* 127*   * 142*   * 100*         Assessment/Plan:    Patient Active Problem List   Diagnosis    Ventral hernia with bowel  obstruction    Crohn's disease (HCC)    History of colon cancer    Depression    Crohn's disease of small intestine with intestinal obstruction (HCC)    Septic shock (HCC)     58 y.o. female with complex past abdominal surgical history and Crohn's with enterocutaneous fistula x 2 currently admitted with septic shock       -Consult wound care/enterostomal nurse for recommendations regarding possible pouching of midline fistulas and for wound care of surrounding skin; appreciate wound care recs  -Strict I's and O's of fistula output.  Strict I's and O's colostomy output  -Able to continue TPN from surgical point of view.    -Patient was previously on TPN ordered by medical team at facility.  Will defer to primary medical team for management.  -Keep patient NPO okay for sips of water with meds  -Appreciate critical care recs    Edilberto Tim DO  General Surgery Resident, PGY-1    Electronically signed on 12/29/2023 at 7:24 AM

## 2023-12-29 NOTE — PROGRESS NOTES
Critical Care Team - Daily Progress Note         Date and time: 12/29/2023 9:22 AM  Patient's name:  Maryam Santana  Medical Record Number: [id number]  Patient's account/billing number: [id number]  Patient's YOB: 1965  Age: 58 y.o.  Date of Admission: 12/28/2023  9:52 AM  Length of stay during current admission: 1      Primary Care Physician: Matthew Russell MD  ICU Attending Physician:      Code Status: Prior    Reason for ICU admission: Severe Sepsis, Septic Shock       SUBJECTIVE:     The patient is a 58 y.o. female from nursing facility with PMHx Crohn's disease on Humira, enteroccutaneous fistula, DMII, complex past abdominal surgical history who presents to the ED with fevers, chills, nausea along with abdominal pain and pain near her PICC line insertion site.      Patient was hospitalized May 2023 for several complications related to partial hysterectomy and returned later for postsurgical abscess.  Patient had underwent abdominal laparotomy with bowel resection end of May.     Patient presented to the ER febrile Tmax 22.6 °F, hypotensive with a BP of 70/40.  Labs significant for WBC 3.1 lactic 5.1, procalcitonin 47.1, , , , total bili 3.5, direct bili 2.6.  Ultrasound gallbladder showed mild hepatomegaly, status postcholecystectomy.  Patient was given 2 L 0.9% bolus, vancomycin and Zosyn.  Levophed was started at 11 mcg.    12/29: off pressors    CURRENT VENTILATION STATUS:     [] Ventilator  [] BIPAP  [x] Nasal Cannula [] Room Air      IF INTUBATED, ET TUBE MARKING AT LOWER LIP:       cms    SECRETIONS Amount:  [] Small [] Moderate  [] Large  [x] None  Color:     [] White [] Colored  [] Bloody    SEDATION:  RAAS Score:  [] Propofol gtt  [] Versed gtt  [] Ativan gtt   [x] No Sedation    PARALYZED:  [x] No    [] Yes    VASOPRESSORS:  [x] No    [] Yes    If yes -   [] Levophed       [] Dopamine     [] Vasopressin       [] Dobutamine  [] Phenylephrine

## 2023-12-29 NOTE — H&P
patients with extremes of muscle mass, extra-renal  metabolism of creatine, excessive creatine ingestion, or following therapy that affects  renal tubular secretion.       Calcium 7.8 Low  mg/dL    Total Protein 5.0 Low  g/dL    Albumin 2.5 Low  g/dL    Total Bilirubin 3.6 High  mg/dL    Alkaline Phosphatase 127 High  U/L     High  U/L     High  U/L   Magnesium [[id number]]    Collected: 12/29/23 0410    Updated: 12/29/23 0627    Specimen Type: Blood     Magnesium 1.6 mg/dL   Phosphorus [[id number]] (Abnormal)    Collected: 12/29/23 0410    Updated: 12/29/23 0627    Specimen Type: Blood     Phosphorus 2.3 Low  mg/dL   EKG Rhythm Strip [[id number]]    Collected: 12/28/23 2330    Updated: 12/29/23 0005    Narrative:     SC   Lactic Acid [[id number]]    Collected: 12/28/23 2315    Updated: 12/28/23 2358    Specimen Source: Blood     Lactic Acid 1.8 mmol/L   Culture, MRSA, Screening [[id number]]    Collected: 12/28/23 1815    Updated: 12/28/23 2338    Specimen Type: Nose    Specimen Source: Nares    Lactic Acid [[id number]] (Abnormal)    Collected: 12/28/23 1632    Updated: 12/28/23 1716    Specimen Source: Blood     Lactic Acid 2.8 High  mmol/L   Respiratory Panel, Molecular, with COVID-19 (Restricted: peds pts or suitable admitted adults) [[id number]]    Collected: 12/28/23 1337    Updated: 12/28/23 1632    Specimen Source: Nasopharyngeal Swab     Specimen Description .NASOPHARYNGEAL SWAB    Adenovirus PCR Not Detected    Coronavirus 229E PCR Not Detected    Coronavirus HKU1 PCR Not Detected    Coronavirus NL63 PCR Not Detected    Coronavirus OC43 PCR Not Detected    SARS-CoV-2, PCR Not Detected    Human Metapneumovirus PCR Not Detected    Rhino/Enterovirus PCR Not Detected    Influenza A by PCR Not Detected    Influenza B by PCR Not Detected    Parainfluenza 1 PCR Not Detected    Parainfluenza 2 PCR Not Detected    Parainfluenza 3 PCR Not Detected    Parainfluenza 4 PCR Not Detected    Resp Syncytial  Virus PCR Not Detected    Bordetella parapertussis by PCR Not Detected    B Pertussis by PCR Not Detected    Chlamydia pneumoniae By PCR Not Detected    Mycoplasma pneumo by PCR Not Detected    Comment: Performed by multiplexed nucleic acid assay.      Sedimentation Rate [[id number]]    Collected: 12/28/23 1337    Updated: 12/28/23 1600    Specimen Type: Blood     Sed Rate 18 mm/Hr   Procalcitonin [[id number]] (Abnormal)    Collected: 12/28/23 1337    Updated: 12/28/23 1556    Specimen Type: Blood     Procalcitonin 47.11 High  ng/mL   C-Reactive Protein [[id number]] (Abnormal)    Collected: 12/28/23 1337    Updated: 12/28/23 1555    Specimen Type: Blood     .0 High  mg/L   CT ABDOMEN PELVIS W IV CONTRAST Additional Contrast? None [[id number]]    Collected: 12/28/23 1521    Updated: 12/28/23 1532    Narrative:     EXAMINATION:  CT OF THE ABDOMEN AND PELVIS WITH CONTRAST 12/28/2023 12:15 pm    TECHNIQUE:  CT of the abdomen and pelvis was performed with the administration of  intravenous contrast. Multiplanar reformatted images are provided for review.  Automated exposure control, iterative reconstruction, and/or weight based  adjustment of the mA/kV was utilized to reduce the radiation dose to as low  as reasonably achievable.    COMPARISON:  September 14, 2023    HISTORY:  ORDERING SYSTEM PROVIDED HISTORY: enterocutaneous fistula, fever  TECHNOLOGIST PROVIDED HISTORY:  Additional Contrast?->None  Reason for exam:->enterocutaneous fistula, fever  Decision Support Exception - unselect if not a suspected or confirmed  emergency medical condition->Emergency Medical Condition (MA)    FINDINGS:  Postsurgical changes are present related to bowel resection.  Left-sided  ostomy is present.  Small loculated fluid and gas collection is located in  ventral abdominal wall subcutaneous fat along left aspect of margin of  surgical scar (axial 17, series 2.  This lesion measures approximately 2 x 8  mm.  This lesion appears

## 2023-12-29 NOTE — PROCEDURES
PROCEDURE  12/29/23       Time: 1615    CENTRAL LINE INSERTION  Risks, benefits and alternatives (for applicable procedures below) described.   Performed By: Pawan Donis DO.    Indication: centrally administered medications and need for frequent blood draws.  Informed consent: Written consent obtained. The patient was counseled regarding the procedure in person, it's indications, risks, potential complications and alternatives and any questions were answered. Consent was obtained..  Procedure: After routine sterile preparation, local anesthesia obtained by infiltration using 1% Lidocaine without epinephrine. A left 3-Lumen 7F Central Venous Catheter was placed by internal jugular vein approach and secured by standard fashion.   Ultrasound Guidance:   used.  Number of Attempts: 1  Post-procedure Findings: A post procedural chest x-ray  was ordered and is still pending at this time.  Patient tolerated the procedure well.       PAWAN DONIS PGY-2  12/29/23 1639     Attending Physician Attestation: Dr. Leonel Dumont    Thank you very much for allowing me to see this patient in consultation and follow up.    I personally saw, examined and provided care for the patient. Radiographs, labs and medication list were reviewed by me independently. I spoke with bedside nursing, respiratory therapists and consultants. Critical care services and times documented are independent of procedures and multidisciplinary rounds with Residents. Additionally comprehensive, multidisciplinary rounds were conducted with the MICU team. The case was discussed in detail and plans for care were established. Review of Residents documentation was conducted and revisions were made as appropriate. I agree with the the above documented information.  I was personally present during the completion of procedure.    Leonel Dumont  12/29/2023  5:50 PM

## 2023-12-29 NOTE — PROGRESS NOTES
Unable to record or document output from fistula d/t no way of collecting output. Wound care nurse applied large ostomy bag to cover fistulas and collect output. Will monitor and record as able.

## 2023-12-29 NOTE — PLAN OF CARE
Problem: Discharge Planning  Goal: Discharge to home or other facility with appropriate resources  12/29/2023 1018 by Jaida Liu RN  Outcome: Not Progressing  Flowsheets (Taken 12/29/2023 0800)  Discharge to home or other facility with appropriate resources: Identify discharge learning needs (meds, wound care, etc)  12/28/2023 2321 by Isamar Granda RN  Outcome: Progressing     Problem: ABCDS Injury Assessment  Goal: Absence of physical injury  12/29/2023 1018 by Jaida Lui RN  Outcome: Progressing  Flowsheets (Taken 12/29/2023 1017)  Absence of Physical Injury: Implement safety measures based on patient assessment  12/28/2023 2321 by Isamar Granda RN  Outcome: Progressing     Problem: Pain  Goal: Verbalizes/displays adequate comfort level or baseline comfort level  12/29/2023 1019 by Jaida Liu RN  Outcome: Progressing  12/29/2023 1018 by Jaida Liu RN  Outcome: Progressing     Problem: Safety - Adult  Goal: Free from fall injury  12/29/2023 1019 by Jaida Liu RN  Outcome: Progressing  12/29/2023 1018 by Jaida Liu RN  Outcome: Progressing     Problem: Discharge Planning  Goal: Discharge to home or other facility with appropriate resources  12/29/2023 1018 by Jaida Liu RN  Outcome: Not Progressing  Flowsheets (Taken 12/29/2023 0800)  Discharge to home or other facility with appropriate resources: Identify discharge learning needs (meds, wound care, etc)  12/28/2023 2321 by Isamar Granda RN  Outcome: Progressing

## 2023-12-29 NOTE — CARE COORDINATION
Social Work / Discharge Planning : Patient admitted from [facility].Julia from [facility] verified patient is a bed hold . No pre-cert to return. N 17 generated and transport forms completed. Patient currently on TPN. If needed at D/C, may need LTAC. Medicare insurance. AWait treatment plan. SW to follow. Electronically signed by MENG Sumner on 12/29/23 at 3:45 PM EST

## 2023-12-29 NOTE — PROGRESS NOTES
Patient abdomen draining large amount from two open sites. Site cleansed and changed. PICC line removed and sent tip for culture. Medicated for pain. VSS will continue to monitor.

## 2023-12-29 NOTE — FLOWSHEET NOTE
Inpatient Wound Care    Admit Date: 12/28/2023  9:52 AM    Reason for consult:  Ostomy and fistulas    Significant history:  Admitted for Septic Shock. History of Crohn's, colon cancer, EC fistula, depression.     Wound history:  POA    Findings:     12/29/23 1827   Colostomy LLQ   Placement Date/Time: (c) 12/28/23 (c) 2300   Present on Admission/Arrival: Yes  Description (optional): colostomy  Location: LLQ   Stomal Appliance 1 piece   Flange Size (inches)   (stoma 30 mm)   Stoma  Assessment Red;Protrudes   Peristomal Assessment Intact   Mucocutaneous Junction Intact   Treatment Pouch change   Stool Appearance Watery   Stool Color Brown   Stool Amount Smear   Output (mL) 50 ml   Fistula   Placement Date/Time: 12/29/23 1829   Present on Admission/Arrival: Yes   Stomal Appliance 1 piece   Flange Size (inches)   (4\" high output pouch)   Peristomal Assessment Denuded   Treatment Pouch change   Stool Color Brown   Stool Appearance Watery   Stool Amount Medium         Impression:  Colostomy and midline fistulas.     Interventions in place:  Two midline fistulas noted Pt states that the colostomy does not function much. Most of her output is from the fistulas.  These were pouched using Coloplast 1 pc 4\" high output pouch with a ring.  The colostomy stoma was pouched using 1 pc Coloplast pouch and a ring.   Lots of emotional support given to Pt.     Plan:Pouch fistulas and colostomy.       Lisseth Barroso RN 12/29/2023 6:31 PM

## 2023-12-29 NOTE — PROGRESS NOTES
Intensive Care Daily Quality Rounding Checklist      ICU Team Members: Dr. Dumont, Dr. Morrow (resident), charge nurse, bedside nurse, respiratory therapist    ICU Day #: NUMBER: 2    Intubation Date: n/a    Ventilator Day #: n/a    Central Line Insertion Date: December 28 pt has had her PICC line in place prior to admission here.         Day #: NUMBER: 2        Indication: CVCIndication: Total Parental Nutrition (TPN)     Arterial Line Insertion Date: n/a       Day #: n/a    Temporary Hemodialysis Catheter Insertion Date: n/a       Day #: n/a      DVT Prophylaxis: on Eliquis chronically-will just start DVT prophylaxis Lovenox for now until reason for anticoagulation is found    GI Prophylaxis: start Protonix    [place] Catheter Insertion Date:  n/a        Day #: n/a                             Indications: n/a                             Continued need (if yes, reason documented and discussed with physician): n/a    Skin Issues/ Wounds and ordered treatment discussed on rounds: yes, pt has a entercutaneous fistula, as well as a colostomy--Wound Care consulted     Goals/ Plans for the Day: Daily labs, remove PICC, place TLC for TPN, up ad miracle, Wound Care to see, get records from [facility]    Reviewed plan and goals for day with patient and/or representative: Yes

## 2023-12-29 NOTE — DISCHARGE INSTR - COC
alert    IV Access:  - None    Nursing Mobility/ADLs:  Walking   Independent  Transfer  Independent  Bathing  Independent  Dressing  Independent  Toileting  Independent  Feeding  Independent  Med Admin  Independent  Med Delivery   none    Wound Care Documentation and Therapy:  Wound 06/25/12 Other (Comment) Abdomen Mid surgical incesion unapproximated  (Active)   Wound Type Incision 12/29/23 0815   Wound Etiology Non-Healing Surgical 12/29/23 0815   Dressing Status New dressing applied 12/29/23 0815   Dressing Changed Changed/New 12/29/23 0815   Wound Cleansed Cleansed with saline 12/29/23 0815   Drainage Amount Copious 12/29/23 0815   Drainage Description Thin;Yellow 12/29/23 0815   Odor None 12/29/23 0815   Number of days: 4204        Elimination:  Continence:   Bowel: COLOSTOMY  Bladder: Yes  Urinary Catheter: None   Colostomy/Ileostomy/Ileal Conduit: Yes  Colostomy LLQ-Stomal Appliance: 1 piece  Colostomy LLQ-Stoma  Assessment: Red, Protrudes  Colostomy LLQ-Stool Appearance: Watery  Colostomy LLQ-Stool Color: Green  Colostomy LLQ-Stool Amount: Medium  Colostomy LLQ-Output (mL): 100 ml    Date of Last BM: 1/5/24    Intake/Output Summary (Last 24 hours) at 12/29/2023 1547  Last data filed at 12/29/2023 0147  Gross per 24 hour   Intake 2656.43 ml   Output 700 ml   Net 1956.43 ml     I/O last 3 completed shifts:  In: 2656.4 [I.V.:472; IV Piggyback:2184.4]  Out: 700 [Urine:600; Stool:100]    Safety Concerns:     None    Impairments/Disabilities:      None    Nutrition Therapy:  Current Nutrition Therapy:   - Parenteral Nutrition:  62.5 ml over 24 hrs per day (see PN orders for content)    Routes of Feeding: Parenteral Nutrition (PN)  Liquids: NPO, sips with meds and ice chips  Daily Fluid Restriction: no  Last Modified Barium Swallow with Video (Video Swallowing Test): not done    Treatments at the Time of Hospital Discharge:   Respiratory Treatments: none  Oxygen Therapy:  is not on home oxygen therapy.  Ventilator:     - No ventilator support    Rehab Therapies: Physical Therapy, Occupational Therapy, and Speech/Language Therapy  Weight Bearing Status/Restrictions: No weight bearing restrictions  Other Medical Equipment (for information only, NOT a DME order):  TPN  Other Treatments: NONE    Patient's personal belongings (please select all that are sent with patient):  Jewelry, clothes, phone    RN SIGNATURE:  Electronically signed by Wendi Hirsch RN on 1/5/24 at 5:45 PM EST    CASE MANAGEMENT/SOCIAL WORK SECTION    Inpatient Status Date: 12/28/2023    Readmission Risk Assessment Score:  Readmission Risk              Risk of Unplanned Readmission:  12           Discharging to Facility/ Agency   Name: [facility]  Address:[address] [address]  Phone:[phone]  Fax:[phone]    Dialysis Facility (if applicable)   Name:  Address:  Dialysis Schedule:  Phone:  Fax:    / signature: {Esignature:[id number]}Electronically signed by MENG Sumner on 12/29/23 at 3:48 PM EST     PHYSICIAN SECTION    Prognosis: {Prognosis:2177007303}    Condition at Discharge: { Patient Condition:722066221}    Rehab Potential (if transferring to Rehab): {Prognosis:5365395296}    Recommended Labs or Other Treatments After Discharge: ***    Physician Certification: I certify the above information and transfer of Maryam Santana  is necessary for the continuing treatment of the diagnosis listed and that she requires Intermediate Nursing Care for greater 30 days.     Update Admission H&P: {CHP DME Changes in HandP:199780522}    PHYSICIAN SIGNATURE:  {Esignature:[id number]}    =========================================================================    [facility]  ([facility])  [address] [place] [address]  [address] [place] [address]  Phone [phone]   Fax [phone]    Leonel Birch MD, FACP   MD Taylor Medina MD Indra P. Limbu, MD   Agnieszka

## 2023-12-29 NOTE — PROGRESS NOTES
Patient admitted from ER to room 217, placed on monitor, patient oriented to room and unit visiting hours.  Patient guide at bedside, reviewed patient rights and responsibilities. MRSA nasal swab obtained. Bed alarm on, call light within reach.

## 2023-12-29 NOTE — CONSULTS
Gastroenterology Consult Note   Mariel GRACE, MITALI-C with Mitesh Jaquez M.D. Consult Note        Date of Service: 12/29/2023  Reason for Consult: positive blood cultures, Hx of crohns and enterocutaneous fistula   Requesting Physician: Pawan Morrow DO     CHIEF COMPLAINT:  fevers    History Obtained From:  patient, electronic medical record    HISTORY OF PRESENT ILLNESS:       Maryam Santana is a 58 y.o. female with significant past medical history of colon cancer, Crohns disease, depression, anal fissure, hernia, and HTN admitted via ED for fever & chills.  Pt reports to being  hospitalized in May 2023 for several complications related to partial hysterectomy \"nicked my bowel\" and returned later for postsurgical abscess. Patient had underwent abdominal laparotomy with bowel resection at that time in [place]. Has been in a SNF for TPN administration, since discharge in June. States she has been NPO ??? Uncertain why. Denies any recent weight loss. Feeling bloated in her abdomen. Denies any other GI complaints at this time. Ostomy appliance with liquid yellow stools noted. Reports she was on Humira in the past- (2015), nothing recent. Last EGD & colon in April this year. Follows with Dr. Wong in [place] for GI services.  Admission labs chloride 97, CO2 21, BUN 26, lactic acid 5.1, , albumin 3.1, , , , bili 3.5, WBC 3.1, MPV 12.5, plat 122.  CXR:  No acute process. GB US: Mild hepatomegaly. Status post cholecystectomy. CT ABD/Pelvis:1. Redemonstration of postsurgical changes related to prior bowel resection with left-sided ostomy. 2. Small loculated fluid and gas collection located in ventral abdominal subcutaneous fat along left aspect of surgical scar.  This lesion is slightly smaller compared to prior from September 14, 2023, yet may indicate residual or recurrent abscess or fistula.  Repeat CT with oral contrast may be helpful for further evaluation.  Clinical correlation  04:10 AM    CALCIUM 7.8 12/29/2023 04:10 AM    BILITOT 3.6 12/29/2023 04:10 AM    ALKPHOS 127 12/29/2023 04:10 AM     12/29/2023 04:10 AM     12/29/2023 04:10 AM     Hepatic Function Panel:    Lab Results   Component Value Date/Time    ALKPHOS 127 12/29/2023 04:10 AM     12/29/2023 04:10 AM     12/29/2023 04:10 AM    PROT 5.0 12/29/2023 04:10 AM    BILITOT 3.6 12/29/2023 04:10 AM    BILIDIR 2.6 12/28/2023 10:30 AM    IBILI 0.1 10/06/2015 06:07 AM    LABALBU 2.5 12/29/2023 04:10 AM       CT ABDOMEN PELVIS W IV CONTRAST Additional Contrast? None    Result Date: 12/28/2023  EXAMINATION: CT OF THE ABDOMEN AND PELVIS WITH CONTRAST 12/28/2023 12:15 pm TECHNIQUE: CT of the abdomen and pelvis was performed with the administration of intravenous contrast. Multiplanar reformatted images are provided for review. Automated exposure control, iterative reconstruction, and/or weight based adjustment of the mA/kV was utilized to reduce the radiation dose to as low as reasonably achievable. COMPARISON: September 14, 2023 HISTORY: ORDERING SYSTEM PROVIDED HISTORY: enterocutaneous fistula, fever TECHNOLOGIST PROVIDED HISTORY: Additional Contrast?->None Reason for exam:->enterocutaneous fistula, fever Decision Support Exception - unselect if not a suspected or confirmed emergency medical condition->Emergency Medical Condition (MA) FINDINGS: Postsurgical changes are present related to bowel resection.  Left-sided ostomy is present.  Small loculated fluid and gas collection is located in ventral abdominal wall subcutaneous fat along left aspect of margin of surgical scar (axial 17, series 2.  This lesion measures approximately 2 x 8 mm.  This lesion appears smaller when correlated with the previous examination.  No bowel obstruction, free air, or free fluid.  Liver is grossly unremarkable.  Evidence of cholecystectomy.  No evidence of acute pancreatitis.  Stable mild splenomegaly.  Nonobstructing 2 mm left

## 2023-12-29 NOTE — PROGRESS NOTES
Pharmacy Consultation Note  (Antibiotic Dosing and Monitoring)    Initial consult date: 12/28/23  Consulting physician/provider: Dmitri  Drug: Vancomycin  Indication: Sepsis    Age/  Gender Height Weight IBW  Allergy Information   58 y.o./female 154.9 cm (5' 1\") 60.3 kg (133 lb)     Ideal body weight: 47.8 kg (105 lb 6.1 oz)  Adjusted ideal body weight: 52.8 kg (116 lb 6.8 oz)   Dilaudid [hydromorphone hcl], Cocoa, Lactose, Lyrica [pregabalin], Phenergan [promethazine hcl], and Phenytoin sodium extended      Renal Function:  Recent Labs     12/28/23  1025   BUN 26*   CREATININE 0.9       Intake/Output Summary (Last 24 hours) at 12/28/2023 2026  Last data filed at 12/28/2023 1904  Gross per 24 hour   Intake 389.91 ml   Output --   Net 389.91 ml       Vancomycin Monitoring:  Trough:  No results for input(s): \"VANCOTROUGH\" in the last 72 hours.  Random:  No results for input(s): \"VANCORANDOM\" in the last 72 hours.    Vancomycin Administration Times:  Recent vancomycin administrations                     vancomycin (VANCOCIN) 1,500 mg in sodium chloride 0.9 % 250 mL IVPB (mg) 1,500 mg New Bag 12/28/23 9479                    Assessment:  Patient is a 58 y.o. female who has been initiated on vancomycin  Estimated Creatinine Clearance: 58 mL/min (based on SCr of 0.9 mg/dL).  Plan:  Will continue vancomycin 750 mg IV every 12 hours  Will check vancomycin levels when appropriate  Will continue to monitor renal function   Pharmacy to follow      Tg Arroyo RPH 12/28/2023 8:26 PM

## 2023-12-29 NOTE — PROGRESS NOTES
Pharmacy Consultation Note  (Antibiotic Dosing and Monitoring)        Note vancomycin discontinued. Clinical pharmacy will sign-off. Please re-consult if we can be of further assistance.    Thanks for this consult,  Adilene Jiménez RPH, PharmD, BCPS  12/29/2023  11:49 AM

## 2023-12-29 NOTE — PLAN OF CARE
Problem: ABCDS Injury Assessment  Goal: Absence of physical injury  Outcome: Progressing     Problem: Discharge Planning  Goal: Discharge to home or other facility with appropriate resources  Outcome: Progressing

## 2023-12-29 NOTE — PLAN OF CARE
Problem: Discharge Planning  Goal: Discharge to home or other facility with appropriate resources  12/29/2023 1018 by Jaida Liu RN  Outcome: Not Progressing  Flowsheets (Taken 12/29/2023 0800)  Discharge to home or other facility with appropriate resources: Identify discharge learning needs (meds, wound care, etc)  12/28/2023 2321 by Isamar Granda RN  Outcome: Progressing     Problem: ABCDS Injury Assessment  Goal: Absence of physical injury  12/29/2023 1018 by Jaida Liu RN  Outcome: Progressing  Flowsheets (Taken 12/29/2023 1017)  Absence of Physical Injury: Implement safety measures based on patient assessment  12/28/2023 2321 by Isamar Granda RN  Outcome: Progressing     Problem: Pain  Goal: Verbalizes/displays adequate comfort level or baseline comfort level  Outcome: Progressing     Problem: Safety - Adult  Goal: Free from fall injury  Outcome: Progressing     Problem: Discharge Planning  Goal: Discharge to home or other facility with appropriate resources  12/29/2023 1018 by Jaida Liu RN  Outcome: Not Progressing  Flowsheets (Taken 12/29/2023 0800)  Discharge to home or other facility with appropriate resources: Identify discharge learning needs (meds, wound care, etc)  12/28/2023 2321 by Isamar Granda, RN  Outcome: Progressing

## 2023-12-29 NOTE — PROGRESS NOTES
[facility]  [facility]  Progress Note    SUBJECTIVE:  Chief Complaint   Patient presents with    Fever     The patient is in the ICU.  He admits to having abdominal pain.  Tolerating antibiotics.  Still having fevers.  No nausea or vomiting.    Review of systems:  As stated above in the chief complaint, otherwise negative.    Medications:  Scheduled Meds:   potassium phosphate IVPB (CENTRAL LINE)  10 mmol IntraVENous Once    piperacillin-tazobactam  4,500 mg IntraVENous Q8H    vancomycin  750 mg IntraVENous Q12H    sodium chloride flush  5-40 mL IntraVENous 2 times per day     Continuous Infusions:   norepinephrine Stopped (23 0150)    sodium chloride      sodium chloride 150 mL/hr at 23 0238     PRN Meds:sodium chloride flush, sodium chloride    OBJECTIVE:  /68   Pulse 75   Temp 98 °F (36.7 °C) (Temporal)   Resp 16   Ht 1.549 m (5' 1\")   Wt 60.3 kg (133 lb)   SpO2 98%   BMI 25.13 kg/m²   Temp  Av.2 °F (37.3 °C)  Min: 98 °F (36.7 °C)  Max: 102.6 °F (39.2 °C)  Constitutional: The patient is lying in bed in the ICU.  She is awake and alert.  No distress.  Skin: Warm and dry. No rashes were noted.   HEENT: Round and reactive pupils.  Moist mucous membranes.  No ulcerations or thrush.  Neck: Supple to movements.   Chest: No use of accessory muscles to breathe. Symmetrical expansion.  No wheezing, crackles or rhonchi.  Cardiovascular: S1 and S2 are rhythmic and regular. No murmurs appreciated.   Abdomen: Positive bowel sounds.  Tender to palpation over epigastrium and right upper quadrant.  Colostomy left lower quadrant.  Fistula below umbilicus.  Extremities: No clubbing, no cyanosis, no edema.  Lines: Peripheral.  Right PICC.  Franklin catheter.    Laboratory and Tests:  Lab Results   Component Value Date    .0 (H) 2023    CRP 0.9 (H) 2015     Lab Results   Component Value Date    SEDRATE 18 2023    SEDRATE 11 2015        Radiology:      Microbiology:   Rapid SARS-CoV-2: Negative  Rapid influenza: Negative  Respiratory panel: Negative  Blood cultures 12/28/2023: Klebsiella pneumoniae in 4 of 4 bottles  Blood culture 12/29/2023: Pending  PICC tip culture 12/29/2023: Pending    Recent Labs     12/28/23  1337   PROCAL 47.11*       ASSESSMENT:  Probable ascending cholangitis  Probable CLABSI  Klebsiella septicemia associated to the above  Sepsis with septic shock associated to the above  Fever associated to the above-improved  Leukopenia secondary to sepsis-improving  Chronic enterocutaneous fistula  Crohn disease    PLAN:  Continue Zosyn alone  Remove PICC and culture tip  Okay for central line for TPN  Repeat blood cultures x 2  Consult GI  We will follow with you    Discussed with ICU team    Cody Rizvi MD  10:14 AM  12/29/2023

## 2023-12-29 NOTE — PROGRESS NOTES
Attending Physician Attestation: Dr. Leonel Dumont    Thank you very much for allowing me to see this patient in consultation and follow up.    I personally saw, examined and provided care for the patient. Radiographs, labs and medication list were reviewed by me independently. I spoke with bedside nursing, respiratory therapists and consultants. Critical care services and times documented are independent of procedures and multidisciplinary rounds with Residents. Additionally comprehensive, multidisciplinary rounds were conducted with the MICU team. The case was discussed in detail and plans for care were established. Review of Residents documentation was conducted and revisions were made as appropriate. I agree with the the above documented information.     Current Facility-Administered Medications   Medication Dose Route Frequency Provider Last Rate Last Admin    potassium phosphate 10 mmol in sodium chloride 0.9 % 250 mL IVPB  10 mmol IntraVENous Once Daniel Haynes APRN -  mL/hr at 12/29/23 0828 10 mmol at 12/29/23 0828    norepinephrine (LEVOPHED) 16 mg in sodium chloride 0.9 % 250 mL infusion  1-100 mcg/min IntraVENous Continuous Tushar Barroso DO   Stopped at 12/29/23 0150    piperacillin-tazobactam (ZOSYN) 4,500 mg in sodium chloride 0.9 % 100 mL IVPB (vial-mate)  4,500 mg IntraVENous Q8H Cody Rizvi MD   Stopped at 12/29/23 0907    vancomycin (VANCOCIN) 750 mg in sodium chloride 0.9 % 250 mL IVPB  750 mg IntraVENous Q12H Cody Rizvi MD   Stopped at 12/29/23 0346    sodium chloride flush 0.9 % injection 5-40 mL  5-40 mL IntraVENous 2 times per day Shlomo Monique MD   10 mL at 12/29/23 0831    sodium chloride flush 0.9 % injection 5-40 mL  5-40 mL IntraVENous PRN Shlomo Monique MD        0.9 % sodium chloride infusion   IntraVENous PRN Shlomo Mnoique MD        0.9 % sodium chloride infusion   IntraVENous Continuous Daniel Haynes APRN -  mL/hr at  12/29/23 0238 New Bag at 12/29/23 0238        CT ABDOMEN PELVIS W IV CONTRAST Additional Contrast? None   Final Result   1. Redemonstration of postsurgical changes related to prior bowel resection   with left-sided ostomy.   2. Small loculated fluid and gas collection located in ventral abdominal   subcutaneous fat along left aspect of surgical scar.  This lesion is slightly   smaller compared to prior from September 14, 2023, yet may indicate residual   or recurrent abscess or fistula.  Repeat CT with oral contrast may be helpful   for further evaluation.  Clinical correlation recommended.   3. Diverticulosis.   4. No acute process in the abdomen or pelvis.         US GALLBLADDER RUQ   Final Result   Mild hepatomegaly. Status post cholecystectomy.         XR CHEST PORTABLE   Final Result   No acute process.         US GALLBLADDER RUQ    (Results Pending)       Physical Examination:  Vitals:   Vitals:    12/29/23 0600 12/29/23 0800 12/29/23 0900 12/29/23 0959   BP: 93/70 97/72 102/68    Pulse: 68 78 79 75   Resp: 16 19 18 16   Temp:  98 °F (36.7 °C)     TempSrc:  Temporal     SpO2:   98%    Weight:       Height:          General: No Acute Distress  HEENT: normocephalic, atraumatic, scleral icterus noted  Abdomen: soft, NT, ND  - 2 separate 2 mm enterocutaneous fistulas  = ostomy intact, draining bile  CVS: RRR, S1, S2, No S3 or S4  Respiratory: decreased breath sounds at lung bases, no focal wheezes noted  Extremities: no clubbing/cyanosis/edema  Neuro: intact    ASSESSMENT:  Severe Sepsis, Septic Shock - bacteremia/septicemia   Concern for ascending cholangitis   CLABSI - Growth of Enterobacterales, Klebsiella pneumoniae  Chronic TPN   H/O Crohn's disease with enteric cutaneous fistula   Bibasilar Atelectasis   Immunocompromised Host - on humira for Crohn's Disease  Tobacco Use  Type II Diabetes Mellitus   Hx of colon cancer - colostomy in place  Metabolic Acidosis  Lactic Acidosis  Hypoalbuminemia   Direct

## 2023-12-29 NOTE — CONSULTS
Comprehensive Nutrition Assessment    Type and Reason for Visit:  Initial, Positive Nutrition Screen, Consult (Parenteral Nutrition Recommendation)    Nutrition Recommendations/Plan:   Recommend obtain current TG level and replace Phos (low now) as TPN is resumed    Recommend the following TPN:    PN RECOMMENDATION: 2-in-1 Custom PN with 250 ml 20% lipids x 3 per week  300 g Dextrose, 75 g AA at 62.5 ml/hr (1500 ml/d) with Lipids x 3 per week  This will provide: 1534 kana, 75 g AA (average 21 g/fat/d)  This regimen will meet 100% energy and protein needs    Continue inpatient monitoring     Malnutrition Assessment:  Malnutrition Status:  At risk for malnutrition (Comment) (altered GI function requiring TPN support) (12/29/23 1209)    Context:  Chronic Illness     Findings of the 6 clinical characteristics of malnutrition:  Energy Intake:  Mild decrease in energy intake (Comment) (NPO and TPN off since admit)  Weight Loss:  Unable to assess     Body Fat Loss:  Unable to assess     Muscle Mass Loss:  Unable to assess    Fluid Accumulation:  No significant fluid accumulation     Strength:  Not Performed    Nutrition Assessment:    Pt admit from ECF 2/2 septic shock d/t probable CLABSI and probable ascending cholangitis. PMH includes Crohn's, colon CA, colostomy, EC fistula, TPN nutrition support. Will recommend 2-in-1 PN with Lipids 3x/wk d/t current transaminitis, which may be related to long-term TPN. Also recommend current TG.    Nutrition Related Findings:    A&Ox4, N/V and febrile PTA, hypophosphatemia, elevated LFT, tender bloated abd w/hypo BS, colostomy +medium stool, edentulous, EC fistula Wound Type: Surgical Incision       Current Nutrition Intake & Therapies:    Average Meal Intake: NPO  Average Supplements Intake: NPO  Diet NPO Exceptions are: Ice Chips, Sips of Water with Meds    Anthropometric Measures:  Height: 154.9 cm (5' 1\")  Ideal Body Weight (IBW): 105 lbs (48 kg)    Admission Body Weight: 60.3  kg (132 lb 15 oz) (12/28)  Current Body Weight: 60.3 kg (132 lb 15 oz), 126.6 % IBW. Weight Source: Stated (12/28)  Current BMI (kg/m2): 25.1  Usual Body Weight: 76.2 kg (167 lb 15.9 oz) (at PCP in Feb 2023)  % Weight Change (Calculated): -20.9                    BMI Categories: Overweight (BMI 25.0-29.9)    Estimated Daily Nutrient Needs:  Energy Requirements Based On: Formula ([place] Jeor)  Weight Used for Energy Requirements: Current  Energy (kcal/day):   Weight Used for Protein Requirements: Ideal  Protein (g/day): 65-75 (1.4-1.6 g/kg)  Method Used for Fluid Requirements: Other (Comment)  Fluid (ml/day): per Critical Care    Nutrition Diagnosis:   Inadequate oral intake related to altered GI function as evidenced by nutrition support - parenteral nutrition, NPO or clear liquid status due to medical condition    Nutrition Interventions:   Food and/or Nutrient Delivery: Continue NPO (Recommend cycle lipids in TPN to 3x/week and obtain current TG)  Nutrition Education/Counseling: Education not indicated  Coordination of Nutrition Care: Continue to monitor while inpatient       Goals:     Goals: Tolerate nutrition support at goal rate, by next RD assessment       Nutrition Monitoring and Evaluation:   Behavioral-Environmental Outcomes: None Identified  Food/Nutrient Intake Outcomes: Parenteral Nutrition Intake/Tolerance  Physical Signs/Symptoms Outcomes: Biochemical Data, GI Status, Fluid Status or Edema, Nutrition Focused Physical Findings, Skin, Weight    Discharge Planning:    Parenteral Nutrition     Abby Dorsey RD, CNSC, LD  Contact: x [phone]

## 2023-12-29 NOTE — PROGRESS NOTES
4 Eyes Skin Assessment     NAME:  Maryam Santana  YOB: 1965  MEDICAL RECORD NUMBER:  [id number]    The patient is being assessed for  Admission    I agree that at least one RN has performed a thorough Head to Toe Skin Assessment on the patient. ALL assessment sites listed below have been assessed.      Areas assessed by both nurses:    Head, Face, Ears, Shoulders, Back, Chest, Arms, Elbows, Hands, Sacrum. Buttock, Coccyx, Ischium, Legs. Feet and Heels, Under Medical Devices , and Other          Does the Patient have a Wound? No noted wound(s)  Enterocutaneous fistula (surgery following)       Tato Prevention initiated by RN: Yes  Wound Care Orders initiated by RN: No    Pressure Injury (Stage 3,4, Unstageable, DTI, NWPT, and Complex wounds) if present, place Wound referral order by RN under : No    New Ostomies, if present place, Ostomy referral order under : Yes old ostomy     Nurse 1 eSignature: Electronically signed by Wallace Mahan RN on 12/28/23 at 11:34 PM EST    **SHARE this note so that the co-signing nurse can place an eSignature**    Nurse 2 eSignature: Electronically signed by Isamar Granda RN on 12/28/23 at 11:39 PM EST

## 2023-12-30 LAB
ALBUMIN SERPL-MCNC: 2.5 G/DL (ref 3.5–5.2)
ALP SERPL-CCNC: 152 U/L (ref 35–104)
ALT SERPL-CCNC: 88 U/L (ref 0–32)
ANION GAP SERPL CALCULATED.3IONS-SCNC: 11 MMOL/L (ref 7–16)
ANION GAP SERPL CALCULATED.3IONS-SCNC: 9 MMOL/L (ref 7–16)
AST SERPL-CCNC: 110 U/L (ref 0–31)
BASOPHILS # BLD: 0.05 K/UL (ref 0–0.2)
BASOPHILS NFR BLD: 1 % (ref 0–2)
BILIRUB SERPL-MCNC: 3.1 MG/DL (ref 0–1.2)
BUN SERPL-MCNC: 13 MG/DL (ref 6–20)
BUN SERPL-MCNC: 8 MG/DL (ref 6–20)
CALCIUM SERPL-MCNC: 8.1 MG/DL (ref 8.6–10.2)
CALCIUM SERPL-MCNC: 8.2 MG/DL (ref 8.6–10.2)
CHLORIDE SERPL-SCNC: 104 MMOL/L (ref 98–107)
CHLORIDE SERPL-SCNC: 106 MMOL/L (ref 98–107)
CO2 SERPL-SCNC: 19 MMOL/L (ref 22–29)
CO2 SERPL-SCNC: 23 MMOL/L (ref 22–29)
CREAT SERPL-MCNC: 0.7 MG/DL (ref 0.5–1)
CREAT SERPL-MCNC: 0.7 MG/DL (ref 0.5–1)
EOSINOPHIL # BLD: 0.1 K/UL (ref 0.05–0.5)
EOSINOPHILS RELATIVE PERCENT: 2 % (ref 0–6)
ERYTHROCYTE [DISTWIDTH] IN BLOOD BY AUTOMATED COUNT: 14.6 % (ref 11.5–15)
GFR SERPL CREATININE-BSD FRML MDRD: >60 ML/MIN/1.73M2
GFR SERPL CREATININE-BSD FRML MDRD: >60 ML/MIN/1.73M2
GLUCOSE SERPL-MCNC: 101 MG/DL (ref 74–99)
GLUCOSE SERPL-MCNC: 119 MG/DL (ref 74–99)
HCT VFR BLD AUTO: 28.5 % (ref 34–48)
HGB BLD-MCNC: 10.1 G/DL (ref 11.5–15.5)
IGG SERPL-MCNC: 970 MG/DL (ref 700–1600)
IGM SERPL-MCNC: 82 MG/DL (ref 40–230)
LACTATE BLDV-SCNC: 2.3 MMOL/L (ref 0.5–2.2)
LYMPHOCYTES NFR BLD: 1.47 K/UL (ref 1.5–4)
LYMPHOCYTES RELATIVE PERCENT: 30 % (ref 20–42)
MAGNESIUM SERPL-MCNC: 1.4 MG/DL (ref 1.6–2.6)
MAGNESIUM SERPL-MCNC: 1.6 MG/DL (ref 1.6–2.6)
MCH RBC QN AUTO: 33.8 PG (ref 26–35)
MCHC RBC AUTO-ENTMCNC: 35.4 G/DL (ref 32–34.5)
MCV RBC AUTO: 95.3 FL (ref 80–99.9)
MICROORGANISM SPEC CULT: NORMAL
MONOCYTES NFR BLD: 0.25 K/UL (ref 0.1–0.95)
MONOCYTES NFR BLD: 5 % (ref 2–12)
NEUTROPHILS NFR BLD: 62 % (ref 43–80)
NEUTS SEG NFR BLD: 3.04 K/UL (ref 1.8–7.3)
PHOSPHATE SERPL-MCNC: 1.8 MG/DL (ref 2.5–4.5)
PHOSPHATE SERPL-MCNC: 2.5 MG/DL (ref 2.5–4.5)
PLATELET # BLD AUTO: 113 K/UL (ref 130–450)
PMV BLD AUTO: 12.7 FL (ref 7–12)
POTASSIUM SERPL-SCNC: 3 MMOL/L (ref 3.5–5)
POTASSIUM SERPL-SCNC: 3.1 MMOL/L (ref 3.5–5)
PROT SERPL-MCNC: 5.1 G/DL (ref 6.4–8.3)
RBC # BLD AUTO: 2.99 M/UL (ref 3.5–5.5)
RBC # BLD: ABNORMAL 10*6/UL
RBC # BLD: ABNORMAL 10*6/UL
SODIUM SERPL-SCNC: 136 MMOL/L (ref 132–146)
SODIUM SERPL-SCNC: 136 MMOL/L (ref 132–146)
SPECIMEN DESCRIPTION: NORMAL
WBC OTHER # BLD: 4.9 K/UL (ref 4.5–11.5)

## 2023-12-30 PROCEDURE — 85025 COMPLETE CBC W/AUTO DIFF WBC: CPT

## 2023-12-30 PROCEDURE — 6360000002 HC RX W HCPCS: Performed by: NURSE PRACTITIONER

## 2023-12-30 PROCEDURE — C9113 INJ PANTOPRAZOLE SODIUM, VIA: HCPCS

## 2023-12-30 PROCEDURE — 2060000000 HC ICU INTERMEDIATE R&B

## 2023-12-30 PROCEDURE — A4216 STERILE WATER/SALINE, 10 ML: HCPCS

## 2023-12-30 PROCEDURE — 6360000002 HC RX W HCPCS: Performed by: SPECIALIST

## 2023-12-30 PROCEDURE — 6360000002 HC RX W HCPCS

## 2023-12-30 PROCEDURE — 36592 COLLECT BLOOD FROM PICC: CPT

## 2023-12-30 PROCEDURE — 2580000003 HC RX 258: Performed by: SPECIALIST

## 2023-12-30 PROCEDURE — 2580000003 HC RX 258: Performed by: INTERNAL MEDICINE

## 2023-12-30 PROCEDURE — 6360000002 HC RX W HCPCS: Performed by: INTERNAL MEDICINE

## 2023-12-30 PROCEDURE — 83735 ASSAY OF MAGNESIUM: CPT

## 2023-12-30 PROCEDURE — 2580000003 HC RX 258

## 2023-12-30 PROCEDURE — 2500000003 HC RX 250 WO HCPCS: Performed by: INTERNAL MEDICINE

## 2023-12-30 PROCEDURE — 2500000003 HC RX 250 WO HCPCS

## 2023-12-30 PROCEDURE — 80053 COMPREHEN METABOLIC PANEL: CPT

## 2023-12-30 PROCEDURE — 84100 ASSAY OF PHOSPHORUS: CPT

## 2023-12-30 PROCEDURE — 80048 BASIC METABOLIC PNL TOTAL CA: CPT

## 2023-12-30 PROCEDURE — 99232 SBSQ HOSP IP/OBS MODERATE 35: CPT | Performed by: STUDENT IN AN ORGANIZED HEALTH CARE EDUCATION/TRAINING PROGRAM

## 2023-12-30 PROCEDURE — 83605 ASSAY OF LACTIC ACID: CPT

## 2023-12-30 RX ORDER — POTASSIUM CHLORIDE 7.45 MG/ML
10 INJECTION INTRAVENOUS
Status: DISCONTINUED | OUTPATIENT
Start: 2023-12-30 | End: 2023-12-30 | Stop reason: ALTCHOICE

## 2023-12-30 RX ORDER — POTASSIUM CHLORIDE 29.8 MG/ML
40 INJECTION INTRAVENOUS ONCE
Status: COMPLETED | OUTPATIENT
Start: 2023-12-30 | End: 2023-12-31

## 2023-12-30 RX ORDER — MAGNESIUM SULFATE IN WATER 40 MG/ML
2000 INJECTION, SOLUTION INTRAVENOUS ONCE
Status: COMPLETED | OUTPATIENT
Start: 2023-12-30 | End: 2023-12-31

## 2023-12-30 RX ORDER — MORPHINE SULFATE 2 MG/ML
2 INJECTION, SOLUTION INTRAMUSCULAR; INTRAVENOUS EVERY 4 HOURS PRN
Status: DISCONTINUED | OUTPATIENT
Start: 2023-12-30 | End: 2024-01-05

## 2023-12-30 RX ORDER — POTASSIUM CHLORIDE 29.8 MG/ML
40 INJECTION INTRAVENOUS ONCE
Status: COMPLETED | OUTPATIENT
Start: 2023-12-30 | End: 2023-12-30

## 2023-12-30 RX ADMIN — Medication 10 ML: at 20:21

## 2023-12-30 RX ADMIN — MORPHINE SULFATE 2 MG: 2 INJECTION, SOLUTION INTRAMUSCULAR; INTRAVENOUS at 02:09

## 2023-12-30 RX ADMIN — POTASSIUM CHLORIDE 40 MEQ: 29.8 INJECTION, SOLUTION INTRAVENOUS at 22:36

## 2023-12-30 RX ADMIN — SODIUM PHOSPHATE, MONOBASIC, MONOHYDRATE AND SODIUM PHOSPHATE, DIBASIC, ANHYDROUS 15 MMOL: 142; 276 INJECTION, SOLUTION INTRAVENOUS at 11:00

## 2023-12-30 RX ADMIN — POTASSIUM CHLORIDE 40 MEQ: 29.8 INJECTION, SOLUTION INTRAVENOUS at 11:05

## 2023-12-30 RX ADMIN — MORPHINE SULFATE 2 MG: 2 INJECTION, SOLUTION INTRAMUSCULAR; INTRAVENOUS at 20:30

## 2023-12-30 RX ADMIN — MORPHINE SULFATE 2 MG: 2 INJECTION, SOLUTION INTRAMUSCULAR; INTRAVENOUS at 10:45

## 2023-12-30 RX ADMIN — PIPERACILLIN AND TAZOBACTAM 4500 MG: 4; .5 INJECTION, POWDER, LYOPHILIZED, FOR SOLUTION INTRAVENOUS at 18:54

## 2023-12-30 RX ADMIN — Medication 10 ML: at 11:06

## 2023-12-30 RX ADMIN — PIPERACILLIN AND TAZOBACTAM 4500 MG: 4; .5 INJECTION, POWDER, LYOPHILIZED, FOR SOLUTION INTRAVENOUS at 10:54

## 2023-12-30 RX ADMIN — ENOXAPARIN SODIUM 40 MG: 100 INJECTION SUBCUTANEOUS at 10:49

## 2023-12-30 RX ADMIN — SOYBEAN OIL 250 ML: 20 INJECTION, SOLUTION INTRAVENOUS at 20:43

## 2023-12-30 RX ADMIN — MAGNESIUM SULFATE HEPTAHYDRATE 2000 MG: 40 INJECTION, SOLUTION INTRAVENOUS at 22:39

## 2023-12-30 RX ADMIN — SODIUM CHLORIDE, PRESERVATIVE FREE 40 MG: 5 INJECTION INTRAVENOUS at 10:49

## 2023-12-30 RX ADMIN — ASCORBIC ACID, VITAMIN A PALMITATE, CHOLECALCIFEROL, THIAMINE HYDROCHLORIDE, RIBOFLAVIN-5 PHOSPHATE SODIUM, PYRIDOXINE HYDROCHLORIDE, NIACINAMIDE, DEXPANTHENOL, ALPHA-TOCOPHEROL ACETATE, VITAMIN K1, FOLIC ACID, BIOTIN, CYANOCOBALAMIN: 200; 3300; 200; 6; 3.6; 6; 40; 15; 10; 150; 600; 60; 5 INJECTION, SOLUTION INTRAVENOUS at 20:40

## 2023-12-30 RX ADMIN — PIPERACILLIN AND TAZOBACTAM 4500 MG: 4; .5 INJECTION, POWDER, LYOPHILIZED, FOR SOLUTION INTRAVENOUS at 00:15

## 2023-12-30 RX ADMIN — MORPHINE SULFATE 2 MG: 2 INJECTION, SOLUTION INTRAMUSCULAR; INTRAVENOUS at 06:10

## 2023-12-30 RX ADMIN — MORPHINE SULFATE 2 MG: 2 INJECTION, SOLUTION INTRAMUSCULAR; INTRAVENOUS at 15:13

## 2023-12-30 ASSESSMENT — PAIN SCALES - GENERAL
PAINLEVEL_OUTOF10: 0
PAINLEVEL_OUTOF10: 7
PAINLEVEL_OUTOF10: 7
PAINLEVEL_OUTOF10: 8
PAINLEVEL_OUTOF10: 0
PAINLEVEL_OUTOF10: 6
PAINLEVEL_OUTOF10: 0
PAINLEVEL_OUTOF10: 7
PAINLEVEL_OUTOF10: 7
PAINLEVEL_OUTOF10: 8

## 2023-12-30 ASSESSMENT — PAIN DESCRIPTION - ORIENTATION
ORIENTATION: MID

## 2023-12-30 ASSESSMENT — PAIN - FUNCTIONAL ASSESSMENT
PAIN_FUNCTIONAL_ASSESSMENT: ACTIVITIES ARE NOT PREVENTED
PAIN_FUNCTIONAL_ASSESSMENT: ACTIVITIES ARE NOT PREVENTED

## 2023-12-30 ASSESSMENT — PAIN DESCRIPTION - LOCATION
LOCATION: ABDOMEN

## 2023-12-30 ASSESSMENT — PAIN DESCRIPTION - DESCRIPTORS
DESCRIPTORS: ACHING;DISCOMFORT
DESCRIPTORS: ACHING;DISCOMFORT;SHARP
DESCRIPTORS: DISCOMFORT;TENDER;SHARP
DESCRIPTORS: CRAMPING;DULL;DISCOMFORT
DESCRIPTORS: ACHING;DISCOMFORT

## 2023-12-30 ASSESSMENT — PAIN DESCRIPTION - PAIN TYPE
TYPE: CHRONIC PAIN
TYPE: CHRONIC PAIN

## 2023-12-30 NOTE — PROGRESS NOTES
infectious disease and critical care input appreciated. On TPN through central line.     Robina Rodriguez DO    Electronically signed by Robina Rodriguez DO on 12/30/23 at 8:20 AM EST

## 2023-12-30 NOTE — PROGRESS NOTES
GENERAL SURGERY  DAILY PROGRESS NOTE    Patient's Name/Date of Birth: Maryam Santana / 1965    Date: 2023     Chief Complaint   Patient presents with    Fever        Subjective:    Doing ok, has some sharp pains when her fistula opens and drains, drinking little liquids which we want, started TPN and tolerating well, wound ostomy trying different wound managers   Objective:  Last 24Hrs  Temp  Av.6 °F (37 °C)  Min: 98 °F (36.7 °C)  Max: 98.9 °F (37.2 °C)  Resp  Av.1  Min: 16  Max: 28  Pulse  Av.7  Min: 64  Max: 79  Systolic (24hrs), Av , Min:83 , Max:130     Diastolic (24hrs), Av, Min:55, Max:85    SpO2  Av.3 %  Min: 92 %  Max: 100 %    I/O last 3 completed shifts:  In: 7949.1 [I.V.:3726.8; IV Piggyback:3188]  Out: 2250 [Urine:2050; Stool:200]      General: Alert and oriented x4  Cardiovascular: Warm throughout, no edema  Respiratory: no respiratory distress, equal chest rise  Abdomen: soft, Soft, nondistended, nontender. Left upper quadrant colostomy with stoma pink/well-perfused.  midline fistula with continued thin succus output, not controlled by wound manager  Skin: no obvious rashes or lesions appreciated, no jaundice  Extremities: atraumatic, no focal motor deficits, no open wounds      CBC  Recent Labs     23  1025 23  0410 23  0506   WBC 3.1* 8.1 4.9   RBC 3.76 3.07* 2.99*   HGB 12.1 9.9* 10.1*   HCT 36.1 30.0* 28.5*   MCV 96.0 97.7 95.3   MCH 32.2 32.2 33.8   MCHC 33.5 33.0 35.4*   RDW 14.3 14.7 14.6   * 107* 113*   MPV 12.5* 12.3* 12.7*       CMP  Recent Labs     23  1025 23  0410 23  1145 23  0506    140  --  136   K 3.5 3.5  --  3.1*   CL 97* 113*  --  106   CO2 21* 19*  --  19*   BUN 26* 17  --  13   CREATININE 0.9 0.7  --  0.7   GLUCOSE 136* 80  --  119*   CALCIUM 9.3 7.8*  --  8.2*   PROT 6.4 5.0*  --  5.1*   LABALBU 3.1* 2.5*  --  2.5*   BILITOT 3.5* 3.6* 3.6* 3.1*   ALKPHOS 229* 127*  --  152*   AST

## 2023-12-30 NOTE — PROGRESS NOTES
Critical Care Team - Daily Progress Note         Date and time: 12/30/2023 9:49 AM  Patient's name:  Maryam Santana  Medical Record Number: [id number]  Patient's account/billing number: [id number]  Patient's YOB: 1965  Age: 58 y.o.  Date of Admission: 12/28/2023  9:52 AM  Length of stay during current admission: 2      Primary Care Physician: Matthew Russell MD  ICU Attending Physician:      Code Status: Full Code    Reason for ICU admission: Severe Sepsis, Septic Shock       SUBJECTIVE:     The patient is a 58 y.o. female from nursing facility with PMHx Crohn's disease on Humira, enteroccutaneous fistula, DMII, complex past abdominal surgical history who presents to the ED with fevers, chills, nausea along with abdominal pain and pain near her PICC line insertion site.      Patient was hospitalized May 2023 for several complications related to partial hysterectomy and returned later for postsurgical abscess.  Patient had underwent abdominal laparotomy with bowel resection end of May.     Patient presented to the ER febrile Tmax 22.6 °F, hypotensive with a BP of 70/40.  Labs significant for WBC 3.1 lactic 5.1, procalcitonin 47.1, , , , total bili 3.5, direct bili 2.6.  Ultrasound gallbladder showed mild hepatomegaly, status postcholecystectomy.  Patient was given 2 L 0.9% bolus, vancomycin and Zosyn.  Levophed was started at 11 mcg.    12/29: off pressors, PICC line removed, new temporary TLC placed left IJ    12/30: remains stable, blood cultures GNR     CURRENT VENTILATION STATUS:     [] Ventilator  [] BIPAP  [x] Nasal Cannula [] Room Air      IF INTUBATED, ET TUBE MARKING AT LOWER LIP:       cms    SECRETIONS Amount:  [] Small [] Moderate  [] Large  [x] None  Color:     [] White [] Colored  [] Bloody    SEDATION:  RAAS Score:  [] Propofol gtt  [] Versed gtt  [] Ativan gtt   [x] No Sedation    PARALYZED:  [x] No    [] Yes    VASOPRESSORS:  [x] No    [] Yes    If        US GALLBLADDER RUQ    (Results Pending)         Physical Examination:  Vitals:   Vitals          Vitals:     12/29/23 0600 12/29/23 0800 12/29/23 0900 12/29/23 0959   BP: 93/70 97/72 102/68     Pulse: 68 78 79 75   Resp: 16 19 18 16   Temp:   98 °F (36.7 °C)       TempSrc:   Temporal       SpO2:     98%     Weight:           Height:                 General: No Acute Distress  HEENT: normocephalic, atraumatic, scleral icterus noted  Abdomen: soft, NT, ND  - 2 separate 2 mm enterocutaneous fistulas  = ostomy intact, draining bile  CVS: RRR, S1, S2, No S3 or S4  Respiratory: decreased breath sounds at lung bases, no focal wheezes noted  Extremities: no clubbing/cyanosis/edema  Neuro: intact     ASSESSMENT:  Severe Sepsis, Septic Shock - bacteremia/septicemia   Concern for ascending cholangitis   CLABSI - Growth of Enterobacterales, Klebsiella pneumoniae  Chronic TPN   H/O Crohn's disease with enteric cutaneous fistula   Bibasilar Atelectasis   Immunocompromised Host - on humira for Crohn's Disease  Tobacco Use  Type II Diabetes Mellitus   Hx of colon cancer - colostomy in place  Metabolic Acidosis  Lactic Acidosis  Hypoalbuminemia   Direct Hyperbilirubinemia  Transaminitis   S/P Cholecystectomy   H/O Lung Nodules - up to 6 mm in size (9/14/2023)  H/O [facility]      In addition the following applies:     Check: serial labs   Medication Alterations: N/A  Procedures: PICC Line to be removed  Imaging: reviewed  New Consultations: N/A  VENT: N/A     Access: Left IJ TLC  - Right Arm PICC (placed prior to admission removed 12/29/2023)  Consults: OLIVE MARCH  Drips: N/A  DVT Phylaxis: Heparin   GI Prophylaxis: PPI  Nutrition: TPN  ABX: Vancomycin, Zosyn      - Appreciate GI input.  MRI/MRCP pending clinical course  - await future PICC line placement   - OK to floors, pulmonary to follow for lung nodules     Thank you for allowing me to participate in the care of this patient.     Care reviewed with

## 2023-12-30 NOTE — PROGRESS NOTES
PROGRESS NOTE    Patient Presents with/Seen in Consultation For      Reason for Consult: positive blood cultures, Hx of crohns and enterocutaneous fistula      CHIEF COMPLAINT:  fevers    Subjective:     Patient seen asleep in bed. Easily awakens too name. In NAD. Reports to abdominal pain when fistula drains. Drsg intact. Taking minimal ice chips. POC reviewed with the patient, all questions answered.     Review of Systems  Aside from what was mentioned in the PMH and HPI, essentially unremarkable, all others negative.    Objective:     Patient Vitals for the past 8 hrs:   BP Temp Temp src Pulse Resp SpO2   12/30/23 1100 (!) 146/115 -- -- 84 25 (!) 76 %   12/30/23 0800 118/75 98.7 °F (37.1 °C) Oral 62 17 95 %   12/30/23 0640 -- -- -- -- 16 --   12/30/23 0610 -- -- -- -- 16 --   12/30/23 0600 122/82 -- -- 68 24 95 %   12/30/23 0500 130/85 -- -- 73 17 93 %   12/30/23 0400 123/85 98.9 °F (37.2 °C) Oral 67 16 94 %       General appearance: alert, awake, laying in bed, and cooperative  Eyes: conjunctivae/corneas clear. PERRL.  Lungs: clear to auscultation bilaterally  Heart: regular rate and rhythm, no murmur, 2+ pulses;  without edema  Abdomen: soft, tender to palpitation, ostomy- liquid yellow seen, drsg D/I  Extremities: extremities without edema  Pulses: 2+ and symmetric  Skin: Skin color, texture, turgor normal.   Neurologic: Grossly normal    morphine (PF) injection 2 mg, Q4H PRN  sodium phosphate 15 mmol in sodium chloride 0.9 % 250 mL IVPB, Once  potassium chloride 40 mEq in 100 mL IVPB (Central Line), Once  pantoprazole (PROTONIX) 40 mg in sodium chloride (PF) 0.9 % 10 mL injection, Daily  enoxaparin (LOVENOX) injection 40 mg, Daily  lactated ringers IV soln infusion, Continuous  PN-Adult 2-in-1 Central Line (Custom), Continuous TPN  fat emulsion (INTRALIPID/NUTRILIPID) 20 % infusion 250 mL, Q MWF  piperacillin-tazobactam (ZOSYN) 4,500 mg in sodium chloride 0.9 % 100 mL IVPB (vial-mate), Q8H  sodium chloride

## 2023-12-30 NOTE — PROGRESS NOTES
[facility]  [facility]  Progress Note    SUBJECTIVE:  Chief Complaint   Patient presents with    Fever     The patient is in the ICU.  She is still having abdominal pain and cramping.  Tolerating antibiotics.  No longer having fevers.  No nausea or vomiting.  Now on TPN.    Review of systems:  As stated above in the chief complaint, otherwise negative.    Medications:  Scheduled Meds:   sodium phosphate IVPB (PERIPHERAL line)  15 mmol IntraVENous Once    potassium chloride  40 mEq IntraVENous Once    pantoprazole (PROTONIX) 40 mg in sodium chloride (PF) 0.9 % 10 mL injection  40 mg IntraVENous Daily    enoxaparin  40 mg SubCUTAneous Daily    fat emulsion  250 mL IntraVENous Q MWF    piperacillin-tazobactam  4,500 mg IntraVENous Q8H    sodium chloride flush  5-40 mL IntraVENous 2 times per day     Continuous Infusions:   lactated ringers IV soln 75 mL/hr at 23 0631    PN-Adult 2-in-1 Central Line (Custom) 62.5 mL/hr at 23 0631    sodium chloride       PRN Meds:morphine, sodium chloride flush, sodium chloride    OBJECTIVE:  /75   Pulse 62   Temp 98.9 °F (37.2 °C) (Oral)   Resp 17   Ht 1.549 m (5' 1\")   Wt 60.3 kg (133 lb)   SpO2 95%   BMI 25.13 kg/m²   Temp  Av.8 °F (37.1 °C)  Min: 98.6 °F (37 °C)  Max: 98.9 °F (37.2 °C)  Constitutional: The patient is lying in bed in the ICU.  She is awake and alert.  No distress.  Skin: Warm and dry. No rashes were noted.   HEENT: Round and reactive pupils.  Moist mucous membranes.  No ulcerations or thrush.  Neck: Supple to movements.   Chest: No use of accessory muscles to breathe. Symmetrical expansion.  No wheezing, crackles or rhonchi.  Cardiovascular: S1 and S2 are rhythmic and regular. No murmurs appreciated.   Abdomen: Positive bowel sounds.  Tender to palpation over epigastrium and right upper quadrant.  Colostomy left lower quadrant.  Fistula below umbilicus.  Extremities: No clubbing, no cyanosis, no  edema.  Lines: Peripheral.  To IJ TLC 12/29/2023.  Franklin catheter.    Laboratory and Tests:  Lab Results   Component Value Date    .0 (H) 12/28/2023    CRP 0.9 (H) 09/29/2015     Lab Results   Component Value Date    SEDRATE 18 12/28/2023    SEDRATE 11 09/29/2015       Radiology:      Microbiology:   Rapid SARS-CoV-2: Negative  Rapid influenza: Negative  Respiratory panel: Negative  Blood cultures 12/28/2023: Klebsiella pneumoniae in 4 of 4 bottles  Blood culture 12/29/2023: Negative so far  PICC tip culture 12/29/2023: Pending    Recent Labs     12/28/23  1337   PROCAL 47.11*       ASSESSMENT:  Probable ascending cholangitis  Probable CLABSI  Klebsiella septicemia associated to the above  Sepsis with septic shock associated to the above  Fever associated to the above-improved  Leukopenia secondary to sepsis-improving  Chronic enterocutaneous fistula  Crohn disease    PLAN:  Continue Zosyn alone  Check PICC tip culture  Repeat blood cultures x 2  Appreciate GI input.  MRI/MRCP pending clinical course  We will follow with you    Discussed with ICU team    Cody Rizvi MD  8:55 AM  12/30/2023

## 2023-12-31 PROBLEM — R78.81 BACTEREMIA: Status: ACTIVE | Noted: 2023-12-31

## 2023-12-31 PROBLEM — K83.09 ASCENDING CHOLANGITIS: Status: ACTIVE | Noted: 2023-12-31

## 2023-12-31 PROBLEM — I10 HYPERTENSION: Status: ACTIVE | Noted: 2023-12-31

## 2023-12-31 LAB
ACB COMPLEX DNA BLD POS QL NAA+NON-PROBE: NOT DETECTED
ALBUMIN SERPL-MCNC: 2.6 G/DL (ref 3.5–5.2)
ALP SERPL-CCNC: 219 U/L (ref 35–104)
ALT SERPL-CCNC: 79 U/L (ref 0–32)
ANION GAP SERPL CALCULATED.3IONS-SCNC: 10 MMOL/L (ref 7–16)
AST SERPL-CCNC: 89 U/L (ref 0–31)
B FRAGILIS DNA BLD POS QL NAA+NON-PROBE: NOT DETECTED
BASOPHILS # BLD: 0.02 K/UL (ref 0–0.2)
BASOPHILS NFR BLD: 0 % (ref 0–2)
BILIRUB SERPL-MCNC: 3 MG/DL (ref 0–1.2)
BIOFIRE TEST COMMENT: ABNORMAL
BLACTX-M ISLT/SPM QL: NOT DETECTED
BLAIMP ISLT/SPM QL: NOT DETECTED
BLAKPC ISLT/SPM QL: NOT DETECTED
BLAOXA-48-LIKE ISLT/SPM QL: NOT DETECTED
BLAVIM ISLT/SPM QL: NOT DETECTED
BUN SERPL-MCNC: 9 MG/DL (ref 6–20)
C ALBICANS DNA BLD POS QL NAA+NON-PROBE: NOT DETECTED
C AURIS DNA BLD POS QL NAA+NON-PROBE: NOT DETECTED
C GATTII+NEOFOR DNA BLD POS QL NAA+N-PRB: NOT DETECTED
C GLABRATA DNA BLD POS QL NAA+NON-PROBE: NOT DETECTED
C KRUSEI DNA BLD POS QL NAA+NON-PROBE: NOT DETECTED
C PARAP DNA BLD POS QL NAA+NON-PROBE: NOT DETECTED
C TROPICLS DNA BLD POS QL NAA+NON-PROBE: NOT DETECTED
CALCIUM SERPL-MCNC: 8.2 MG/DL (ref 8.6–10.2)
CHLORIDE SERPL-SCNC: 104 MMOL/L (ref 98–107)
CO2 SERPL-SCNC: 22 MMOL/L (ref 22–29)
COLISTIN RES MCR-1 ISLT/SPM QL: NOT DETECTED
CREAT SERPL-MCNC: 0.7 MG/DL (ref 0.5–1)
E CLOAC COMP DNA BLD POS NAA+NON-PROBE: NOT DETECTED
E COLI DNA BLD POS QL NAA+NON-PROBE: NOT DETECTED
E FAECALIS DNA BLD POS QL NAA+NON-PROBE: NOT DETECTED
E FAECIUM DNA BLD POS QL NAA+NON-PROBE: NOT DETECTED
ENTEROBACTERALES DNA BLD POS NAA+N-PRB: DETECTED
EOSINOPHIL # BLD: 0.13 K/UL (ref 0.05–0.5)
EOSINOPHILS RELATIVE PERCENT: 3 % (ref 0–6)
ERYTHROCYTE [DISTWIDTH] IN BLOOD BY AUTOMATED COUNT: 14.3 % (ref 11.5–15)
GFR SERPL CREATININE-BSD FRML MDRD: >60 ML/MIN/1.73M2
GLUCOSE SERPL-MCNC: 111 MG/DL (ref 74–99)
GP B STREP DNA BLD POS QL NAA+NON-PROBE: NOT DETECTED
HAEM INFLU DNA BLD POS QL NAA+NON-PROBE: NOT DETECTED
HCT VFR BLD AUTO: 28.1 % (ref 34–48)
HGB BLD-MCNC: 9.8 G/DL (ref 11.5–15.5)
IMM GRANULOCYTES # BLD AUTO: 0.04 K/UL (ref 0–0.58)
IMM GRANULOCYTES NFR BLD: 1 % (ref 0–5)
K OXYTOCA DNA BLD POS QL NAA+NON-PROBE: NOT DETECTED
KLEBSIELLA SP DNA BLD POS QL NAA+NON-PRB: DETECTED
KLEBSIELLA SP DNA BLD POS QL NAA+NON-PRB: NOT DETECTED
L MONOCYTOG DNA BLD POS QL NAA+NON-PROBE: NOT DETECTED
LACTATE BLDV-SCNC: 1.7 MMOL/L (ref 0.5–2.2)
LYMPHOCYTES NFR BLD: 1.68 K/UL (ref 1.5–4)
LYMPHOCYTES RELATIVE PERCENT: 35 % (ref 20–42)
MAGNESIUM SERPL-MCNC: 1.9 MG/DL (ref 1.6–2.6)
MCH RBC QN AUTO: 32.3 PG (ref 26–35)
MCHC RBC AUTO-ENTMCNC: 34.9 G/DL (ref 32–34.5)
MCV RBC AUTO: 92.7 FL (ref 80–99.9)
MICROORGANISM SPEC CULT: ABNORMAL
MICROORGANISM SPEC CULT: ABNORMAL
MICROORGANISM/AGENT SPEC: ABNORMAL
MICROORGANISM/AGENT SPEC: ABNORMAL
MONOCYTES NFR BLD: 0.5 K/UL (ref 0.1–0.95)
MONOCYTES NFR BLD: 10 % (ref 2–12)
N MEN DNA BLD POS QL NAA+NON-PROBE: NOT DETECTED
NEUTROPHILS NFR BLD: 51 % (ref 43–80)
NEUTS SEG NFR BLD: 2.49 K/UL (ref 1.8–7.3)
P AERUGINOSA DNA BLD POS NAA+NON-PROBE: NOT DETECTED
PHOSPHATE SERPL-MCNC: 3 MG/DL (ref 2.5–4.5)
PLATELET # BLD AUTO: 104 K/UL (ref 130–450)
PMV BLD AUTO: 12.2 FL (ref 7–12)
POTASSIUM SERPL-SCNC: 3.3 MMOL/L (ref 3.5–5)
PROT SERPL-MCNC: 5.3 G/DL (ref 6.4–8.3)
PROTEUS SP DNA BLD POS QL NAA+NON-PROBE: NOT DETECTED
RBC # BLD AUTO: 3.03 M/UL (ref 3.5–5.5)
RESISTANT GENE NDM BY PCR: NOT DETECTED
S AUREUS DNA BLD POS QL NAA+NON-PROBE: NOT DETECTED
S AUREUS+CONS DNA BLD POS NAA+NON-PROBE: NOT DETECTED
S EPIDERMIDIS DNA BLD POS QL NAA+NON-PRB: NOT DETECTED
S LUGDUNENSIS DNA BLD POS QL NAA+NON-PRB: NOT DETECTED
S MALTOPHILIA DNA BLD POS QL NAA+NON-PRB: NOT DETECTED
S MARCESCENS DNA BLD POS NAA+NON-PROBE: NOT DETECTED
S PNEUM DNA BLD POS QL NAA+NON-PROBE: NOT DETECTED
S PYO DNA BLD POS QL NAA+NON-PROBE: NOT DETECTED
SALMONELLA DNA BLD POS QL NAA+NON-PROBE: NOT DETECTED
SERVICE CMNT-IMP: ABNORMAL
SERVICE CMNT-IMP: ABNORMAL
SODIUM SERPL-SCNC: 136 MMOL/L (ref 132–146)
SPECIMEN DESCRIPTION: ABNORMAL
SPECIMEN DESCRIPTION: ABNORMAL
STREPTOCOCCUS DNA BLD POS NAA+NON-PROBE: NOT DETECTED
WBC OTHER # BLD: 4.9 K/UL (ref 4.5–11.5)

## 2023-12-31 PROCEDURE — 2060000000 HC ICU INTERMEDIATE R&B

## 2023-12-31 PROCEDURE — 2580000003 HC RX 258: Performed by: INTERNAL MEDICINE

## 2023-12-31 PROCEDURE — 2580000003 HC RX 258: Performed by: SPECIALIST

## 2023-12-31 PROCEDURE — C9113 INJ PANTOPRAZOLE SODIUM, VIA: HCPCS

## 2023-12-31 PROCEDURE — 2500000003 HC RX 250 WO HCPCS: Performed by: INTERNAL MEDICINE

## 2023-12-31 PROCEDURE — 36592 COLLECT BLOOD FROM PICC: CPT

## 2023-12-31 PROCEDURE — 6360000002 HC RX W HCPCS: Performed by: INTERNAL MEDICINE

## 2023-12-31 PROCEDURE — 6360000002 HC RX W HCPCS: Performed by: SPECIALIST

## 2023-12-31 PROCEDURE — 6360000002 HC RX W HCPCS

## 2023-12-31 PROCEDURE — 80053 COMPREHEN METABOLIC PANEL: CPT

## 2023-12-31 PROCEDURE — 83735 ASSAY OF MAGNESIUM: CPT

## 2023-12-31 PROCEDURE — 2580000003 HC RX 258

## 2023-12-31 PROCEDURE — 83605 ASSAY OF LACTIC ACID: CPT

## 2023-12-31 PROCEDURE — 2580000003 HC RX 258: Performed by: NURSE PRACTITIONER

## 2023-12-31 PROCEDURE — 85025 COMPLETE CBC W/AUTO DIFF WBC: CPT

## 2023-12-31 PROCEDURE — 84100 ASSAY OF PHOSPHORUS: CPT

## 2023-12-31 PROCEDURE — 6360000002 HC RX W HCPCS: Performed by: NURSE PRACTITIONER

## 2023-12-31 RX ORDER — POTASSIUM CHLORIDE 29.8 MG/ML
20 INJECTION INTRAVENOUS ONCE
Status: COMPLETED | OUTPATIENT
Start: 2023-12-31 | End: 2023-12-31

## 2023-12-31 RX ADMIN — SODIUM CHLORIDE, POTASSIUM CHLORIDE, SODIUM LACTATE AND CALCIUM CHLORIDE: 600; 310; 30; 20 INJECTION, SOLUTION INTRAVENOUS at 14:25

## 2023-12-31 RX ADMIN — PIPERACILLIN AND TAZOBACTAM 4500 MG: 4; .5 INJECTION, POWDER, LYOPHILIZED, FOR SOLUTION INTRAVENOUS at 09:01

## 2023-12-31 RX ADMIN — MORPHINE SULFATE 2 MG: 2 INJECTION, SOLUTION INTRAMUSCULAR; INTRAVENOUS at 00:39

## 2023-12-31 RX ADMIN — MORPHINE SULFATE 2 MG: 2 INJECTION, SOLUTION INTRAMUSCULAR; INTRAVENOUS at 20:31

## 2023-12-31 RX ADMIN — MORPHINE SULFATE 2 MG: 2 INJECTION, SOLUTION INTRAMUSCULAR; INTRAVENOUS at 16:25

## 2023-12-31 RX ADMIN — MORPHINE SULFATE 2 MG: 2 INJECTION, SOLUTION INTRAMUSCULAR; INTRAVENOUS at 12:07

## 2023-12-31 RX ADMIN — ENOXAPARIN SODIUM 40 MG: 100 INJECTION SUBCUTANEOUS at 09:01

## 2023-12-31 RX ADMIN — PIPERACILLIN AND TAZOBACTAM 4500 MG: 4; .5 INJECTION, POWDER, LYOPHILIZED, FOR SOLUTION INTRAVENOUS at 00:59

## 2023-12-31 RX ADMIN — PIPERACILLIN AND TAZOBACTAM 4500 MG: 4; .5 INJECTION, POWDER, LYOPHILIZED, FOR SOLUTION INTRAVENOUS at 16:28

## 2023-12-31 RX ADMIN — Medication 10 ML: at 09:01

## 2023-12-31 RX ADMIN — Medication 10 ML: at 20:16

## 2023-12-31 RX ADMIN — SODIUM ACETATE: 164 INJECTION, SOLUTION, CONCENTRATE INTRAVENOUS at 17:32

## 2023-12-31 RX ADMIN — POTASSIUM CHLORIDE 20 MEQ: 29.8 INJECTION, SOLUTION INTRAVENOUS at 08:57

## 2023-12-31 RX ADMIN — MORPHINE SULFATE 2 MG: 2 INJECTION, SOLUTION INTRAMUSCULAR; INTRAVENOUS at 06:37

## 2023-12-31 RX ADMIN — SODIUM CHLORIDE, PRESERVATIVE FREE 40 MG: 5 INJECTION INTRAVENOUS at 08:54

## 2023-12-31 ASSESSMENT — PAIN DESCRIPTION - LOCATION
LOCATION: ABDOMEN

## 2023-12-31 ASSESSMENT — PAIN SCALES - GENERAL
PAINLEVEL_OUTOF10: 8
PAINLEVEL_OUTOF10: 8
PAINLEVEL_OUTOF10: 7
PAINLEVEL_OUTOF10: 8
PAINLEVEL_OUTOF10: 8
PAINLEVEL_OUTOF10: 7
PAINLEVEL_OUTOF10: 5

## 2023-12-31 ASSESSMENT — PAIN DESCRIPTION - DESCRIPTORS
DESCRIPTORS: ACHING;CRAMPING;DISCOMFORT
DESCRIPTORS: CRAMPING

## 2023-12-31 ASSESSMENT — PAIN DESCRIPTION - PAIN TYPE: TYPE: CHRONIC PAIN

## 2023-12-31 NOTE — PROGRESS NOTES
[facility]  [facility]  Progress Note    SUBJECTIVE:  Chief Complaint   Patient presents with    Fever     The patient is in the ICU.  Tolerating antibiotic.  She is still on TPN.  She is having less discomfort in the abdomen.  No fever.    Review of systems:  As stated above in the chief complaint, otherwise negative.    Medications:  Scheduled Meds:   fat emulsion  250 mL IntraVENous Daily    fat emulsion  250 mL IntraVENous Daily    pantoprazole (PROTONIX) 40 mg in sodium chloride (PF) 0.9 % 10 mL injection  40 mg IntraVENous Daily    enoxaparin  40 mg SubCUTAneous Daily    piperacillin-tazobactam  4,500 mg IntraVENous Q8H    sodium chloride flush  5-40 mL IntraVENous 2 times per day     Continuous Infusions:   PN-Adult Premix 5/15 - Standard Electrolytes - Central Line      PN-Adult Premix 5/15 - Standard Electrolytes - Central Line 62.5 mL/hr at 23 0651    lactated ringers IV soln 75 mL/hr at 23 0651    sodium chloride       PRN Meds:morphine, sodium chloride flush, sodium chloride    OBJECTIVE:  /81   Pulse 61   Temp 98.4 °F (36.9 °C) (Oral)   Resp 17   Ht 1.549 m (5' 1\")   Wt 60.3 kg (133 lb)   SpO2 96%   BMI 25.13 kg/m²   Temp  Av.4 °F (36.9 °C)  Min: 98 °F (36.7 °C)  Max: 98.7 °F (37.1 °C)  Constitutional: The patient is lying in bed in the ICU.  She is awake and alert.  No distress.  Skin: Warm and dry. No rashes were noted.   HEENT: Round and reactive pupils.  Moist mucous membranes.  No ulcerations or thrush.  Neck: Supple to movements.   Chest: No respiratory distress.  No crackles.  Cardiovascular: Heart sounds rhythmic and regular.  Abdomen: Positive bowel sounds.  Tender to palpation over epigastrium and right upper quadrant.  Colostomy left lower quadrant.  Fistula below umbilicus.  Extremities: No edema.  Lines: Peripheral.  Left IJ TLC 2023.  Franklin catheter.    Laboratory and Tests:  Lab Results   Component Value Date    .0

## 2023-12-31 NOTE — PLAN OF CARE
Problem: ABCDS Injury Assessment  Goal: Absence of physical injury  Outcome: Progressing     Problem: Discharge Planning  Goal: Discharge to home or other facility with appropriate resources  Outcome: Progressing  Note: Pt would like to go home instead of SNF

## 2023-12-31 NOTE — PATIENT CARE CONFERENCE
Intensive Care Daily Quality Rounding Checklist      ICU Team Members: Dr. Dumont, resident Dr. Mcmillan, bedside RN, TL    ICU Day #: NUMBER: 3    Intubation Date:      Ventilator Day #:     Central Line Insertion Date: December 29        Day #: NUMBER: 2        Indication: CVCIndication: Total Parental Nutrition (TPN)     Arterial Line Insertion Date:        Day #:     Temporary Hemodialysis Catheter Insertion Date:        Day #     DVT Prophylaxis: lovenox    GI Prophylaxis: protonix    Franklin Catheter Insertion Date:  n/a       Day #:       Indications:       Continued need (if yes, reason documented and discussed with physician):     Skin Issues/ Wounds and ordered treatment discussed on rounds: Yes, ostomy skin irritation    Goals/ Plans for the Day: Transfer to , ostomy and fistula skin care    Reviewed plan and goals for day with patient and/or representative: Yes

## 2023-12-31 NOTE — PROGRESS NOTES
Internal Medicine Progress Note    Patient's name: Maryam Santana  : 1965  Chief complaints (on day of admission): Fever  Admission date: 2023  Date of service: 2024   Room: [address]  Primary care physician: Matthew Russell MD  Reason for visit: Follow-up for sepsis    Subjective  Maryam was seen and examined. She was resting very comfortably in her room.  She was sitting in a chair.  She was able to walk around and she was trying to clean her ostomy bag because it was leaking on her abdomen.    She told me the story about her bowel being perforated after having a hysterectomy in the hospital in [place].  She then has been in a nursing facility for about 7 months after that on TPN.    The patient very much wants to go home on discharge and not to a facility.    Review of Systems  There are no new complaints of chest pain, shortness of breath, nausea, vomiting, constipation.    Hospital Medications  Current Facility-Administered Medications   Medication Dose Route Frequency Provider Last Rate Last Admin    fat emulsion (INTRALIPID/NUTRILIPID) 20 % infusion 250 mL  250 mL IntraVENous Q MWF Robina Rodriguez DO        PN-Adult 2-in-1 Central Line (Custom)   IntraVENous Continuous TPN Robina Rodriguez DO 62.5 mL/hr at 24 0619 Rate Verify at 24 06    PN-Adult 2-in-1 Central Line (Custom)   IntraVENous Continuous TPN Robina Rodriguez DO        morphine (PF) injection 2 mg  2 mg IntraVENous Q4H PRN Daniel Haynes APRN - CNP   2 mg at 24 0459    pantoprazole (PROTONIX) 40 mg in sodium chloride (PF) 0.9 % 10 mL injection  40 mg IntraVENous Daily Pawan Morrow DO   40 mg at 23 0854    enoxaparin (LOVENOX) injection 40 mg  40 mg SubCUTAneous Daily Leonel Dumont MD   40 mg at 23 0901    lactated ringers IV soln infusion   IntraVENous Continuous Daniel Haynes APRN - CNP 75 mL/hr at 24 0619 Rate Verify at 24 0619    piperacillin-tazobactam

## 2023-12-31 NOTE — PROGRESS NOTES
Critical Care Team - Daily Progress Note         Date and time: 12/31/2023 12:10 PM  Patient's name:  Maryam Santana  Medical Record Number: [id number]  Patient's account/billing number: [id number]  Patient's YOB: 1965  Age: 58 y.o.  Date of Admission: 12/28/2023  9:52 AM  Length of stay during current admission: 3      Primary Care Physician: Matthew Russell MD  ICU Attending Physician:      Code Status: Full Code    Reason for ICU admission: Severe Sepsis, Septic Shock       SUBJECTIVE:     The patient is a 58 y.o. female from nursing facility with PMHx Crohn's disease on Humira, enteroccutaneous fistula, DMII, complex past abdominal surgical history who presents to the ED with fevers, chills, nausea along with abdominal pain and pain near her PICC line insertion site.      Patient was hospitalized May 2023 for several complications related to partial hysterectomy and returned later for postsurgical abscess.  Patient had underwent abdominal laparotomy with bowel resection end of May.     Patient presented to the ER febrile Tmax 22.6 °F, hypotensive with a BP of 70/40.  Labs significant for WBC 3.1 lactic 5.1, procalcitonin 47.1, , , , total bili 3.5, direct bili 2.6.  Ultrasound gallbladder showed mild hepatomegaly, status postcholecystectomy.  Patient was given 2 L 0.9% bolus, vancomycin and Zosyn.  Levophed was started at 11 mcg.    12/29: off pressors, PICC line removed, new temporary TLC placed left IJ    12/30: remains stable, blood cultures GNR     12/31: blood culture growth Enterobacterales, Klebsiella, on room air, awaiting transfer to floors     CURRENT VENTILATION STATUS:     [] Ventilator  [] BIPAP  [] Nasal Cannula [x] Room Air      IF INTUBATED, ET TUBE MARKING AT LOWER LIP:       cms    SECRETIONS Amount:  [] Small [] Moderate  [] Large  [x] None  Color:     [] White [] Colored  [] Bloody    SEDATION:  RAAS Score:  [] Propofol gtt  [] Versed  Daily    enoxaparin  40 mg SubCUTAneous Daily    piperacillin-tazobactam  4,500 mg IntraVENous Q8H    sodium chloride flush  5-40 mL IntraVENous 2 times per day     Continuous Infusions:   PN-Adult 2-in-1 Central Line (Custom)      [START ON 1/1/2024] PN-Adult 2-in-1 Central Line (Custom)      PN-Adult Premix 5/15 - Standard Electrolytes - Central Line 62.5 mL/hr at 12/31/23 0651    lactated ringers IV soln 75 mL/hr at 12/31/23 0651    sodium chloride       PRN Meds:   morphine, 2 mg, Q4H PRN  sodium chloride flush, 5-40 mL, PRN  sodium chloride, , PRN          VENT SETTINGS (Comprehensive) (if applicable):     Additional Respiratory Assessments  Pulse: 59  Respirations: 20  SpO2: 96 %    ABGs:   No results for input(s): \"PH\", \"PCO2\", \"PO2\", \"HCO3\", \"BE\", \"O2SAT\" in the last 72 hours.    Laboratory findings:    Complete Blood Count:   Recent Labs     12/29/23  0410 12/30/23  0506 12/31/23  0530   WBC 8.1 4.9 4.9   HGB 9.9* 10.1* 9.8*   HCT 30.0* 28.5* 28.1*   * 113* 104*          Last 3 Blood Glucose:   Recent Labs     12/30/23  0506 12/30/23 2020 12/31/23  0530   GLUCOSE 119* 101* 111*          PT/INR:    Lab Results   Component Value Date/Time    PROTIME 13.7 12/29/2023 01:10 PM    INR 1.2 12/29/2023 01:10 PM     PTT:  No results found for: \"APTT\", \"PTT\"    Comprehensive Metabolic Profile:   Recent Labs     12/30/23  0506 12/30/23 2020 12/31/23  0530    136 136   K 3.1* 3.0* 3.3*    104 104   CO2 19* 23 22   BUN 13 8 9   CREATININE 0.7 0.7 0.7   GLUCOSE 119* 101* 111*   CALCIUM 8.2* 8.1* 8.2*   PROT 5.1*  --  5.3*   LABALBU 2.5*  --  2.6*   BILITOT 3.1*  --  3.0*   ALKPHOS 152*  --  219*   *  --  89*   ALT 88*  --  79*        Magnesium:   Lab Results   Component Value Date/Time    MG 1.9 12/31/2023 05:30 AM     Phosphorus:   Lab Results   Component Value Date/Time    PHOS 3.0 12/31/2023 05:30 AM     Ionized Calcium: No results found for: \"CAION\"     Urinalysis:     Troponin: No results

## 2023-12-31 NOTE — PATIENT CARE CONFERENCE
Intensive Care Daily Quality Rounding Checklist        ICU Team Members: Dr. Dumont, resident Dr. Mcmillan, bedside RN, TL     ICU Day #: NUMBER:  12/30/23     Intubation Date:       Ventilator Day #:      Central Line Insertion Date: December 29,2023                                                    Day #: NUMBER: 3                                                    Indication: CVCIndication: Total Parental Nutrition (TPN)      Arterial Line Insertion Date:                               Day #:      Temporary Hemodialysis Catheter Insertion Date:                               Day #      DVT Prophylaxis: lovenox    GI Prophylaxis: protonix     Franklin Catheter Insertion Date:  n/a                                        Day #:                              Indications:                              Continued need (if yes, reason documented and discussed with physician):      Skin Issues/ Wounds and ordered treatment discussed on rounds: Yes, ostomy skin irritation     Goals/ Plans for the Day: Transfer to , ostomy and fistula skin care     Reviewed plan and goals for day with patient and/or representative:

## 2023-12-31 NOTE — PROGRESS NOTES
Patient waiting for room on floors outside ICU.  Again, pulmonary to follow for lung nodule.    Leonel Dumont MD

## 2023-12-31 NOTE — PROGRESS NOTES
Progress Note  Date:2023       Room:0218/0218-A  Patient Name:Maryam Santana     YOB: 1965     Age:58 y.o.    Patient is seen sitting up in her bed, she is alert and awake and appropriate conversation.  She tells me she is doing good however she is not concerned that the output in her ostomy is slowing down and is more so coming out of her fistula.  She denies any nausea or vomiting.  She reports that her abdominal pain is improving.  She is tolerating ice chips.  Subjective    Subjective:  Symptoms:  No shortness of breath, cough, chest pain, headache, chest pressure or diarrhea.    Diet:  No nausea or vomiting.       Review of Systems   Respiratory:  Negative for cough and shortness of breath.    Cardiovascular:  Negative for chest pain.   Gastrointestinal:  Negative for diarrhea, nausea and vomiting.     Objective         Vitals Last 24 Hours:  TEMPERATURE:  Temp  Av.4 °F (36.9 °C)  Min: 98 °F (36.7 °C)  Max: 98.7 °F (37.1 °C)  RESPIRATIONS RANGE: Resp  Av.3  Min: 12  Max: 20  PULSE OXIMETRY RANGE: SpO2  Av %  Min: 96 %  Max: 100 %  PULSE RANGE: Pulse  Av.9  Min: 59  Max: 78  BLOOD PRESSURE RANGE: Systolic (24hrs), Av , Min:121 , Max:139   ; Diastolic (24hrs), Av, Min:73, Max:92    I/O (24Hr):    Intake/Output Summary (Last 24 hours) at 2023 1221  Last data filed at 2023 0651  Gross per 24 hour   Intake 4062.02 ml   Output 850 ml   Net 3212.02 ml       Objective:  General Appearance:  Comfortable and well-appearing.    Vital signs: (most recent): Blood pressure 121/73, pulse 59, temperature 98.3 °F (36.8 °C), temperature source Oral, resp. rate 20, height 1.549 m (5' 1\"), weight 60.3 kg (133 lb), SpO2 96 %.    Lungs:  Normal effort and normal respiratory rate.  Breath sounds clear to auscultation.  No rales, decreased breath sounds, wheezes or rhonchi.    Heart: Normal rate.  Regular rhythm.  S1 normal and S2 normal.  No gallop.   Abdomen: Abdomen is

## 2023-12-31 NOTE — PROGRESS NOTES
Abdomen assessed. No output from ostomy bag, fistulas draining yellow/brown output onto dressing. Site cleansed and dressing changed. No complaints from pt. VSS Will monitor.

## 2023-12-31 NOTE — PROGRESS NOTES
PROGRESS NOTE    Patient Presents with/Seen in Consultation For      Reason for Consult: positive blood cultures, Hx of crohns and enterocutaneous fistula      CHIEF COMPLAINT:  fevers    Subjective:     Patient seen laying in bed, getting a bath. Feeling better today. Denies any N/V today. States the abdominal pain has improved. Sore when the fistula is draining. Ostomy appliance noted to be empty. Minimal PO intake of ice chips. POC reviewed with the patient, all questions answered.     Review of Systems  Aside from what was mentioned in the PMH and HPI, essentially unremarkable, all others negative.    Objective:     Patient Vitals for the past 8 hrs:   BP Temp Temp src Pulse Resp SpO2   12/31/23 0800 128/81 98.4 °F (36.9 °C) Oral 61 17 96 %   12/31/23 0400 (!) 135/92 98 °F (36.7 °C) Oral 75 12 96 %       General appearance: alert, awake, laying in bed, and cooperative  Eyes: conjunctivae/corneas clear. PERRL.  Lungs: clear to auscultation bilaterally  Heart: regular rate and rhythm, no murmur, 2+ pulses;  without edema  Abdomen: soft, tender to palpitation, ostomy- empty, drsg changed per the patient. Fistula noted  Extremities: extremities without edema  Pulses: 2+ and symmetric  Skin: Skin color, texture, turgor normal.   Neurologic: Grossly normal    [START ON 1/1/2024] fat emulsion (INTRALIPID/NUTRILIPID) 20 % infusion 250 mL, Q MWF  PN-Adult 2-in-1 Central Line (Custom), Continuous TPN  [START ON 1/1/2024] PN-Adult 2-in-1 Central Line (Custom), Continuous TPN  morphine (PF) injection 2 mg, Q4H PRN  PN-Adult Premix 5/15 - Standard Electrolytes - Central Line, Continuous TPN  pantoprazole (PROTONIX) 40 mg in sodium chloride (PF) 0.9 % 10 mL injection, Daily  enoxaparin (LOVENOX) injection 40 mg, Daily  lactated ringers IV soln infusion, Continuous  piperacillin-tazobactam (ZOSYN) 4,500 mg in sodium chloride 0.9 % 100 mL IVPB (vial-mate), Q8H  sodium chloride flush 0.9 % injection 5-40 mL, 2 times per  Abena  Diverticulosis  Transaminitis- possible r/t TPN  Chronic TPN  Daily tobacco use/ abuse  Septic shock  Hyperbilirubinemia   Anemia, normocytic   Ascending cholangitis ?  Gas/fluid collection possibly r/t fistula    Plan:     Wound/ostomy nurse following  MRI/MRCP pending clinical course  BC pending- per ID  Continue IV antibiotic  Continue Protonix as ordered  Strict NPO per general surgery orders  Supportive care  General surgery is following  Liver serology & hepatitis panel- pending  Medical management per ICU/Primary care  Trend labs  Follow up with GI- Dr. Wong in [place]. Patient is established with their service  Will follow    Discussed with Dr. Jaquez  Plan per Dr. Gisele Martinez APRN, NP-C 12/31/2023 9:15 AM For Dr. Jaquez

## 2024-01-01 LAB
ALBUMIN SERPL-MCNC: 2.6 G/DL (ref 3.5–5.2)
ALP SERPL-CCNC: 244 U/L (ref 35–104)
ALT SERPL-CCNC: 63 U/L (ref 0–32)
ANION GAP SERPL CALCULATED.3IONS-SCNC: 10 MMOL/L (ref 7–16)
AST SERPL-CCNC: 60 U/L (ref 0–31)
BASOPHILS # BLD: 0.05 K/UL (ref 0–0.2)
BASOPHILS NFR BLD: 1 % (ref 0–2)
BILIRUB SERPL-MCNC: 2.6 MG/DL (ref 0–1.2)
BUN SERPL-MCNC: 9 MG/DL (ref 6–20)
CALCIUM SERPL-MCNC: 8.4 MG/DL (ref 8.6–10.2)
CHLORIDE SERPL-SCNC: 102 MMOL/L (ref 98–107)
CO2 SERPL-SCNC: 23 MMOL/L (ref 22–29)
CREAT SERPL-MCNC: 0.7 MG/DL (ref 0.5–1)
EOSINOPHIL # BLD: 0.21 K/UL (ref 0.05–0.5)
EOSINOPHILS RELATIVE PERCENT: 4 % (ref 0–6)
ERYTHROCYTE [DISTWIDTH] IN BLOOD BY AUTOMATED COUNT: 14 % (ref 11.5–15)
GFR SERPL CREATININE-BSD FRML MDRD: >60 ML/MIN/1.73M2
GLUCOSE SERPL-MCNC: 107 MG/DL (ref 74–99)
HCT VFR BLD AUTO: 29.6 % (ref 34–48)
HGB BLD-MCNC: 10 G/DL (ref 11.5–15.5)
LACTATE BLDV-SCNC: 1.2 MMOL/L (ref 0.5–2.2)
LYMPHOCYTES NFR BLD: 1.68 K/UL (ref 1.5–4)
LYMPHOCYTES RELATIVE PERCENT: 28 % (ref 20–42)
MAGNESIUM SERPL-MCNC: 1.7 MG/DL (ref 1.6–2.6)
MCH RBC QN AUTO: 31.3 PG (ref 26–35)
MCHC RBC AUTO-ENTMCNC: 33.8 G/DL (ref 32–34.5)
MCV RBC AUTO: 92.8 FL (ref 80–99.9)
MONOCYTES NFR BLD: 0.47 K/UL (ref 0.1–0.95)
MONOCYTES NFR BLD: 8 % (ref 2–12)
NEUTROPHILS NFR BLD: 60 % (ref 43–80)
NEUTS SEG NFR BLD: 3.58 K/UL (ref 1.8–7.3)
PHOSPHATE SERPL-MCNC: 3.6 MG/DL (ref 2.5–4.5)
PLATELET # BLD AUTO: 112 K/UL (ref 130–450)
PMV BLD AUTO: 12.6 FL (ref 7–12)
POTASSIUM SERPL-SCNC: 3.2 MMOL/L (ref 3.5–5)
PROT SERPL-MCNC: 5.8 G/DL (ref 6.4–8.3)
RBC # BLD AUTO: 3.19 M/UL (ref 3.5–5.5)
RBC # BLD: ABNORMAL 10*6/UL
RBC # BLD: ABNORMAL 10*6/UL
SODIUM SERPL-SCNC: 135 MMOL/L (ref 132–146)
WBC OTHER # BLD: 6 K/UL (ref 4.5–11.5)

## 2024-01-01 PROCEDURE — 6360000002 HC RX W HCPCS

## 2024-01-01 PROCEDURE — 6360000002 HC RX W HCPCS: Performed by: NURSE PRACTITIONER

## 2024-01-01 PROCEDURE — 2060000000 HC ICU INTERMEDIATE R&B

## 2024-01-01 PROCEDURE — 2580000003 HC RX 258: Performed by: INTERNAL MEDICINE

## 2024-01-01 PROCEDURE — 2500000003 HC RX 250 WO HCPCS: Performed by: INTERNAL MEDICINE

## 2024-01-01 PROCEDURE — 80053 COMPREHEN METABOLIC PANEL: CPT

## 2024-01-01 PROCEDURE — A4216 STERILE WATER/SALINE, 10 ML: HCPCS

## 2024-01-01 PROCEDURE — 84075 ASSAY ALKALINE PHOSPHATASE: CPT

## 2024-01-01 PROCEDURE — 83735 ASSAY OF MAGNESIUM: CPT

## 2024-01-01 PROCEDURE — 84080 ASSAY ALKALINE PHOSPHATASES: CPT

## 2024-01-01 PROCEDURE — 6360000002 HC RX W HCPCS: Performed by: INTERNAL MEDICINE

## 2024-01-01 PROCEDURE — 85025 COMPLETE CBC W/AUTO DIFF WBC: CPT

## 2024-01-01 PROCEDURE — 6360000002 HC RX W HCPCS: Performed by: SPECIALIST

## 2024-01-01 PROCEDURE — 83605 ASSAY OF LACTIC ACID: CPT

## 2024-01-01 PROCEDURE — 2580000003 HC RX 258: Performed by: SPECIALIST

## 2024-01-01 PROCEDURE — C9113 INJ PANTOPRAZOLE SODIUM, VIA: HCPCS

## 2024-01-01 PROCEDURE — 84100 ASSAY OF PHOSPHORUS: CPT

## 2024-01-01 PROCEDURE — 2580000003 HC RX 258

## 2024-01-01 PROCEDURE — 2580000003 HC RX 258: Performed by: NURSE PRACTITIONER

## 2024-01-01 PROCEDURE — 36592 COLLECT BLOOD FROM PICC: CPT

## 2024-01-01 RX ORDER — POTASSIUM CHLORIDE 7.45 MG/ML
10 INJECTION INTRAVENOUS PRN
Status: DISCONTINUED | OUTPATIENT
Start: 2024-01-01 | End: 2024-01-06 | Stop reason: HOSPADM

## 2024-01-01 RX ORDER — POTASSIUM CHLORIDE 20 MEQ/1
40 TABLET, EXTENDED RELEASE ORAL PRN
Status: DISCONTINUED | OUTPATIENT
Start: 2024-01-01 | End: 2024-01-06 | Stop reason: HOSPADM

## 2024-01-01 RX ADMIN — PIPERACILLIN AND TAZOBACTAM 4500 MG: 4; .5 INJECTION, POWDER, LYOPHILIZED, FOR SOLUTION INTRAVENOUS at 23:33

## 2024-01-01 RX ADMIN — Medication 10 ML: at 20:14

## 2024-01-01 RX ADMIN — SODIUM ACETATE: 164 INJECTION, SOLUTION, CONCENTRATE INTRAVENOUS at 18:10

## 2024-01-01 RX ADMIN — PIPERACILLIN AND TAZOBACTAM 4500 MG: 4; .5 INJECTION, POWDER, LYOPHILIZED, FOR SOLUTION INTRAVENOUS at 00:30

## 2024-01-01 RX ADMIN — PIPERACILLIN AND TAZOBACTAM 4500 MG: 4; .5 INJECTION, POWDER, LYOPHILIZED, FOR SOLUTION INTRAVENOUS at 15:47

## 2024-01-01 RX ADMIN — PIPERACILLIN AND TAZOBACTAM 4500 MG: 4; .5 INJECTION, POWDER, LYOPHILIZED, FOR SOLUTION INTRAVENOUS at 09:22

## 2024-01-01 RX ADMIN — MORPHINE SULFATE 2 MG: 2 INJECTION, SOLUTION INTRAMUSCULAR; INTRAVENOUS at 00:33

## 2024-01-01 RX ADMIN — MORPHINE SULFATE 2 MG: 2 INJECTION, SOLUTION INTRAMUSCULAR; INTRAVENOUS at 22:19

## 2024-01-01 RX ADMIN — Medication 10 ML: at 09:35

## 2024-01-01 RX ADMIN — SODIUM CHLORIDE, PRESERVATIVE FREE 40 MG: 5 INJECTION INTRAVENOUS at 09:01

## 2024-01-01 RX ADMIN — SODIUM CHLORIDE, POTASSIUM CHLORIDE, SODIUM LACTATE AND CALCIUM CHLORIDE: 600; 310; 30; 20 INJECTION, SOLUTION INTRAVENOUS at 16:48

## 2024-01-01 RX ADMIN — MORPHINE SULFATE 2 MG: 2 INJECTION, SOLUTION INTRAMUSCULAR; INTRAVENOUS at 13:20

## 2024-01-01 RX ADMIN — SOYBEAN OIL 250 ML: 20 INJECTION, SOLUTION INTRAVENOUS at 18:12

## 2024-01-01 RX ADMIN — ENOXAPARIN SODIUM 40 MG: 100 INJECTION SUBCUTANEOUS at 09:01

## 2024-01-01 RX ADMIN — MORPHINE SULFATE 2 MG: 2 INJECTION, SOLUTION INTRAMUSCULAR; INTRAVENOUS at 18:12

## 2024-01-01 RX ADMIN — MORPHINE SULFATE 2 MG: 2 INJECTION, SOLUTION INTRAMUSCULAR; INTRAVENOUS at 04:59

## 2024-01-01 RX ADMIN — MORPHINE SULFATE 2 MG: 2 INJECTION, SOLUTION INTRAMUSCULAR; INTRAVENOUS at 09:01

## 2024-01-01 ASSESSMENT — PAIN DESCRIPTION - DESCRIPTORS
DESCRIPTORS: ACHING
DESCRIPTORS: ACHING;DISCOMFORT
DESCRIPTORS: CRAMPING;DISCOMFORT;DULL
DESCRIPTORS: CRAMPING;ACHING
DESCRIPTORS: ACHING;DISCOMFORT;DULL
DESCRIPTORS: ACHING

## 2024-01-01 ASSESSMENT — PAIN SCALES - GENERAL
PAINLEVEL_OUTOF10: 2
PAINLEVEL_OUTOF10: 7
PAINLEVEL_OUTOF10: 4
PAINLEVEL_OUTOF10: 6
PAINLEVEL_OUTOF10: 7
PAINLEVEL_OUTOF10: 7
PAINLEVEL_OUTOF10: 6
PAINLEVEL_OUTOF10: 7
PAINLEVEL_OUTOF10: 7
PAINLEVEL_OUTOF10: 6

## 2024-01-01 ASSESSMENT — PAIN DESCRIPTION - ORIENTATION
ORIENTATION: MID

## 2024-01-01 ASSESSMENT — PAIN DESCRIPTION - LOCATION
LOCATION: ABDOMEN

## 2024-01-01 ASSESSMENT — PAIN DESCRIPTION - PAIN TYPE: TYPE: CHRONIC PAIN

## 2024-01-01 NOTE — PROGRESS NOTES
[facility]  [facility]  Progress Note    SUBJECTIVE:  Chief Complaint   Patient presents with    Fever     The patient is in the ICU.  Tolerating antibiotic.  She is still on TPN.  She is still having abdominal discomfort.  No fever.    Review of systems:  As stated above in the chief complaint, otherwise negative.    Medications:  Scheduled Meds:   fat emulsion  250 mL IntraVENous Q MWF    pantoprazole (PROTONIX) 40 mg in sodium chloride (PF) 0.9 % 10 mL injection  40 mg IntraVENous Daily    enoxaparin  40 mg SubCUTAneous Daily    piperacillin-tazobactam  4,500 mg IntraVENous Q8H    sodium chloride flush  5-40 mL IntraVENous 2 times per day     Continuous Infusions:   PN-Adult 2-in-1 Central Line (Custom) 62.5 mL/hr at 24    PN-Adult 2-in-1 Central Line (Custom)      lactated ringers IV soln 75 mL/hr at 24    sodium chloride       PRN Meds:morphine, sodium chloride flush, sodium chloride    OBJECTIVE:  /89   Pulse 58   Temp 98 °F (36.7 °C) (Oral)   Resp 16   Ht 1.549 m (5' 1\")   Wt 60.3 kg (133 lb)   SpO2 98%   BMI 25.13 kg/m²   Temp  Av.2 °F (36.8 °C)  Min: 97.7 °F (36.5 °C)  Max: 98.7 °F (37.1 °C)  Constitutional: The patient is sitting up in the chair in the ICU.  She is awake and in no distress.  Skin: Warm and dry. No rashes were noted.   HEENT: Round and reactive pupils.  Moist mucous membranes.  No ulcerations or thrush.  Neck: Supple to movements.   Chest: No respiratory distress.  No crackles.  Cardiovascular: Heart sounds rhythmic and regular.  Abdomen: Positive bowel sounds.  Tender to palpation.  Colostomy left lower quadrant.  Fistula below umbilicus.  Extremities: No edema.  Lines: Peripheral.  Left IJ TLC 2023.  Franklin catheter.    Laboratory and Tests:  Lab Results   Component Value Date    .0 (H) 2023    CRP 0.9 (H) 2015     Lab Results   Component Value Date    SEDRATE 18 2023    SEDRATE 11

## 2024-01-01 NOTE — PROGRESS NOTES
PROGRESS NOTE    Patient Presents with/Seen in Consultation For      Reason for Consult: positive blood cultures, Hx of crohns and enterocutaneous fistula      CHIEF COMPLAINT:  fevers    Subjective:     Patient seen sitting up in chair, in NAD. States she is feeling better. Minimal ice chips by mouth. Denies any N/V. Ostomy is empty. Fistulas draining yellow liquid. POC reviewed with the patient, all questions answered.    Review of Systems  Aside from what was mentioned in the PMH and HPI, essentially unremarkable, all others negative.    Objective:     Patient Vitals for the past 8 hrs:   BP Temp Temp src Pulse Resp SpO2   01/01/24 1000 -- -- -- 72 14 --   01/01/24 0901 -- -- -- 80 16 --   01/01/24 0900 -- -- -- 80 22 --   01/01/24 0800 133/89 98 °F (36.7 °C) Oral 58 14 98 %   01/01/24 0400 122/82 97.7 °F (36.5 °C) Oral 66 22 98 %       General appearance: alert, awake, up in chair, and cooperative  Eyes: conjunctivae/corneas clear. PERRL.  Lungs: clear to auscultation bilaterally  Heart: regular rate and rhythm, no murmur, 2+ pulses;  without edema  Abdomen: soft, tender to palpitation, ostomy- empty, drsg changed per the patient. Fistula noted  Extremities: extremities without edema  Pulses: 2+ and symmetric  Skin: Skin color, texture, turgor normal.   Neurologic: Grossly normal    potassium chloride (KLOR-CON M) extended release tablet 40 mEq, PRN   Or  potassium bicarb-citric acid (EFFER-K) effervescent tablet 40 mEq, PRN   Or  potassium chloride 10 mEq/100 mL IVPB (Peripheral Line), PRN  fat emulsion (INTRALIPID/NUTRILIPID) 20 % infusion 250 mL, Q MWF  PN-Adult 2-in-1 Central Line (Custom), Continuous TPN  PN-Adult 2-in-1 Central Line (Custom), Continuous TPN  morphine (PF) injection 2 mg, Q4H PRN  pantoprazole (PROTONIX) 40 mg in sodium chloride (PF) 0.9 % 10 mL injection, Daily  enoxaparin (LOVENOX) injection 40 mg, Daily  lactated ringers IV soln infusion, Continuous  piperacillin-tazobactam (ZOSYN) 4,500

## 2024-01-01 NOTE — PATIENT CARE CONFERENCE
Intensive Care Daily Quality Rounding Checklist        ICU Team Members: Dr. Dumont, resident Dr. Mcmillan, bedside RN, TL     ICU Day #: NUMBER:  12/30/23     Intubation Date:       Ventilator Day #:      Central Line Insertion Date: December 29,2023                                                    Day #: NUMBER: 4                                                    Indication: CVCIndication: Total Parental Nutrition (TPN)      Arterial Line Insertion Date:                               Day #:      Temporary Hemodialysis Catheter Insertion Date:                               Day #      DVT Prophylaxis: lovenox    GI Prophylaxis: protonix     Franklin Catheter Insertion Date:  n/a                                        Day #:                              Indications:                              Continued need (if yes, reason documented and discussed with physician):      Skin Issues/ Wounds and ordered treatment discussed on rounds: Yes, ostomy skin irritation     Goals/ Plans for the Day: Transfer to , ostomy and fistula skin care     Reviewed plan and goals for day with patient and/or representative:

## 2024-01-01 NOTE — PLAN OF CARE
Problem: ABCDS Injury Assessment  Goal: Absence of physical injury  Outcome: Progressing     Problem: Discharge Planning  Goal: Discharge to home or other facility with appropriate resources  Outcome: Progressing  Note: Pt would like to be discharged home     Problem: Pain  Goal: Verbalizes/displays adequate comfort level or baseline comfort level  Outcome: Progressing

## 2024-01-02 LAB
ALBUMIN SERPL-MCNC: 2.9 G/DL (ref 3.5–5.2)
ALP SERPL-CCNC: 286 U/L (ref 35–104)
ALT SERPL-CCNC: 57 U/L (ref 0–32)
ANA SER QL IA: NEGATIVE
ANION GAP SERPL CALCULATED.3IONS-SCNC: 11 MMOL/L (ref 7–16)
ANION GAP SERPL CALCULATED.3IONS-SCNC: 11 MMOL/L (ref 7–16)
AST SERPL-CCNC: 51 U/L (ref 0–31)
BASOPHILS # BLD: 0 K/UL (ref 0–0.2)
BASOPHILS NFR BLD: 0 % (ref 0–2)
BILIRUB SERPL-MCNC: 2.3 MG/DL (ref 0–1.2)
BUN SERPL-MCNC: 12 MG/DL (ref 6–20)
BUN SERPL-MCNC: 14 MG/DL (ref 6–20)
CALCIUM SERPL-MCNC: 8.9 MG/DL (ref 8.6–10.2)
CALCIUM SERPL-MCNC: 9 MG/DL (ref 8.6–10.2)
CHLORIDE SERPL-SCNC: 101 MMOL/L (ref 98–107)
CHLORIDE SERPL-SCNC: 104 MMOL/L (ref 98–107)
CO2 SERPL-SCNC: 22 MMOL/L (ref 22–29)
CO2 SERPL-SCNC: 24 MMOL/L (ref 22–29)
CREAT SERPL-MCNC: 0.8 MG/DL (ref 0.5–1)
CREAT SERPL-MCNC: 0.8 MG/DL (ref 0.5–1)
EOSINOPHIL # BLD: 0.54 K/UL (ref 0.05–0.5)
EOSINOPHILS RELATIVE PERCENT: 7 % (ref 0–6)
ERYTHROCYTE [DISTWIDTH] IN BLOOD BY AUTOMATED COUNT: 14.1 % (ref 11.5–15)
GFR SERPL CREATININE-BSD FRML MDRD: >60 ML/MIN/1.73M2
GFR SERPL CREATININE-BSD FRML MDRD: >60 ML/MIN/1.73M2
GLUCOSE SERPL-MCNC: 102 MG/DL (ref 74–99)
GLUCOSE SERPL-MCNC: 98 MG/DL (ref 74–99)
HAV IGM SERPL QL IA: NONREACTIVE
HBV CORE IGM SERPL QL IA: NONREACTIVE
HBV SURFACE AG SERPL QL IA: NONREACTIVE
HCT VFR BLD AUTO: 29.8 % (ref 34–48)
HCV AB SERPL QL IA: NONREACTIVE
HGB BLD-MCNC: 10.3 G/DL (ref 11.5–15.5)
LYMPHOCYTES NFR BLD: 3.01 K/UL (ref 1.5–4)
LYMPHOCYTES RELATIVE PERCENT: 39 % (ref 20–42)
MAGNESIUM SERPL-MCNC: 1.9 MG/DL (ref 1.6–2.6)
MAGNESIUM SERPL-MCNC: 2 MG/DL (ref 1.6–2.6)
MCH RBC QN AUTO: 32.4 PG (ref 26–35)
MCHC RBC AUTO-ENTMCNC: 34.6 G/DL (ref 32–34.5)
MCV RBC AUTO: 93.7 FL (ref 80–99.9)
MICROORGANISM SPEC CULT: ABNORMAL
MITOCHONDRIA AB SER QL: NEGATIVE
MONOCYTES NFR BLD: 0.07 K/UL (ref 0.1–0.95)
MONOCYTES NFR BLD: 1 % (ref 2–12)
NEUTROPHILS NFR BLD: 53 % (ref 43–80)
NEUTS SEG NFR BLD: 4.08 K/UL (ref 1.8–7.3)
PHOSPHATE SERPL-MCNC: 3.4 MG/DL (ref 2.5–4.5)
PLATELET # BLD AUTO: 142 K/UL (ref 130–450)
PMV BLD AUTO: 12 FL (ref 7–12)
POTASSIUM SERPL-SCNC: 3.4 MMOL/L (ref 3.5–5)
POTASSIUM SERPL-SCNC: 3.8 MMOL/L (ref 3.5–5)
PROT SERPL-MCNC: 6.2 G/DL (ref 6.4–8.3)
RBC # BLD AUTO: 3.18 M/UL (ref 3.5–5.5)
RBC # BLD: NORMAL 10*6/UL
SODIUM SERPL-SCNC: 136 MMOL/L (ref 132–146)
SODIUM SERPL-SCNC: 137 MMOL/L (ref 132–146)
SPECIMEN DESCRIPTION: ABNORMAL
WBC OTHER # BLD: 7.7 K/UL (ref 4.5–11.5)

## 2024-01-02 PROCEDURE — 83735 ASSAY OF MAGNESIUM: CPT

## 2024-01-02 PROCEDURE — 6360000002 HC RX W HCPCS

## 2024-01-02 PROCEDURE — 6360000002 HC RX W HCPCS: Performed by: SPECIALIST

## 2024-01-02 PROCEDURE — 2500000003 HC RX 250 WO HCPCS: Performed by: INTERNAL MEDICINE

## 2024-01-02 PROCEDURE — 36592 COLLECT BLOOD FROM PICC: CPT

## 2024-01-02 PROCEDURE — 80048 BASIC METABOLIC PNL TOTAL CA: CPT

## 2024-01-02 PROCEDURE — 2580000003 HC RX 258: Performed by: SPECIALIST

## 2024-01-02 PROCEDURE — 84100 ASSAY OF PHOSPHORUS: CPT

## 2024-01-02 PROCEDURE — 6360000002 HC RX W HCPCS: Performed by: INTERNAL MEDICINE

## 2024-01-02 PROCEDURE — 2580000003 HC RX 258: Performed by: INTERNAL MEDICINE

## 2024-01-02 PROCEDURE — C9113 INJ PANTOPRAZOLE SODIUM, VIA: HCPCS

## 2024-01-02 PROCEDURE — 2580000003 HC RX 258

## 2024-01-02 PROCEDURE — A4216 STERILE WATER/SALINE, 10 ML: HCPCS

## 2024-01-02 PROCEDURE — 2060000000 HC ICU INTERMEDIATE R&B

## 2024-01-02 PROCEDURE — 85025 COMPLETE CBC W/AUTO DIFF WBC: CPT

## 2024-01-02 PROCEDURE — 80053 COMPREHEN METABOLIC PANEL: CPT

## 2024-01-02 PROCEDURE — 2580000003 HC RX 258: Performed by: NURSE PRACTITIONER

## 2024-01-02 PROCEDURE — 6360000002 HC RX W HCPCS: Performed by: NURSE PRACTITIONER

## 2024-01-02 RX ADMIN — POTASSIUM CHLORIDE: 2 INJECTION, SOLUTION, CONCENTRATE INTRAVENOUS at 18:30

## 2024-01-02 RX ADMIN — PIPERACILLIN AND TAZOBACTAM 4500 MG: 4; .5 INJECTION, POWDER, LYOPHILIZED, FOR SOLUTION INTRAVENOUS at 08:41

## 2024-01-02 RX ADMIN — MORPHINE SULFATE 2 MG: 2 INJECTION, SOLUTION INTRAMUSCULAR; INTRAVENOUS at 07:02

## 2024-01-02 RX ADMIN — MORPHINE SULFATE 2 MG: 2 INJECTION, SOLUTION INTRAMUSCULAR; INTRAVENOUS at 15:19

## 2024-01-02 RX ADMIN — MORPHINE SULFATE 2 MG: 2 INJECTION, SOLUTION INTRAMUSCULAR; INTRAVENOUS at 03:00

## 2024-01-02 RX ADMIN — ENOXAPARIN SODIUM 40 MG: 100 INJECTION SUBCUTANEOUS at 09:20

## 2024-01-02 RX ADMIN — SODIUM CHLORIDE, POTASSIUM CHLORIDE, SODIUM LACTATE AND CALCIUM CHLORIDE: 600; 310; 30; 20 INJECTION, SOLUTION INTRAVENOUS at 05:05

## 2024-01-02 RX ADMIN — MORPHINE SULFATE 2 MG: 2 INJECTION, SOLUTION INTRAMUSCULAR; INTRAVENOUS at 11:29

## 2024-01-02 RX ADMIN — MORPHINE SULFATE 2 MG: 2 INJECTION, SOLUTION INTRAMUSCULAR; INTRAVENOUS at 20:01

## 2024-01-02 RX ADMIN — SODIUM CHLORIDE, PRESERVATIVE FREE 40 MG: 5 INJECTION INTRAVENOUS at 08:44

## 2024-01-02 RX ADMIN — PIPERACILLIN AND TAZOBACTAM 4500 MG: 4; .5 INJECTION, POWDER, LYOPHILIZED, FOR SOLUTION INTRAVENOUS at 15:30

## 2024-01-02 RX ADMIN — Medication 10 ML: at 20:01

## 2024-01-02 ASSESSMENT — PAIN SCALES - GENERAL
PAINLEVEL_OUTOF10: 7
PAINLEVEL_OUTOF10: 0
PAINLEVEL_OUTOF10: 6
PAINLEVEL_OUTOF10: 6
PAINLEVEL_OUTOF10: 0
PAINLEVEL_OUTOF10: 3
PAINLEVEL_OUTOF10: 0
PAINLEVEL_OUTOF10: 7

## 2024-01-02 ASSESSMENT — PAIN DESCRIPTION - LOCATION
LOCATION: ABDOMEN

## 2024-01-02 ASSESSMENT — PAIN - FUNCTIONAL ASSESSMENT
PAIN_FUNCTIONAL_ASSESSMENT: PREVENTS OR INTERFERES SOME ACTIVE ACTIVITIES AND ADLS

## 2024-01-02 ASSESSMENT — PAIN DESCRIPTION - ORIENTATION
ORIENTATION: RIGHT;LEFT
ORIENTATION: LEFT;RIGHT;MID
ORIENTATION: RIGHT;LEFT;MID

## 2024-01-02 ASSESSMENT — PAIN DESCRIPTION - DESCRIPTORS
DESCRIPTORS: ACHING;CRAMPING;DULL;DISCOMFORT
DESCRIPTORS: ACHING;BURNING;DISCOMFORT
DESCRIPTORS: DISCOMFORT;STABBING;TENDER

## 2024-01-02 ASSESSMENT — PAIN DESCRIPTION - PAIN TYPE
TYPE: CHRONIC PAIN

## 2024-01-02 NOTE — PROGRESS NOTES
PROGRESS NOTE        Patient Presents with/Seen in Consultation For      Reason for Consult: positive blood cultures, Hx of crohns and enterocutaneous fistula   CHIEF COMPLAINT:  fevers    Subjective:     Patient seen states she is feeling better today.  Mild abdominal discomfort.  No nausea.  Plan of care discussed with patient.    Review of Systems  Aside from what was mentioned in the PMH and HPI, essentially unremarkable, all others negative.    Objective:     /82   Pulse 60   Temp 98.2 °F (36.8 °C) (Oral)   Resp 13   Ht 1.549 m (5' 1\")   Wt 60.3 kg (133 lb)   SpO2 97%   BMI 25.13 kg/m²     General appearance: alert, awake, laying in bed, and cooperative  Eyes: conjunctivae/corneas clear. PERRL.  Lungs: clear to auscultation bilaterally  Heart: regular rate and rhythm, no murmur, 2+ pulses;  without edema  Abdomen: soft, tender to palpitation, ostomy- empty. Fistula noted, erythema  Extremities: extremities without edema  Pulses: 2+ and symmetric  Skin: Skin color, texture, turgor normal.   Neurologic: Grossly normal    potassium chloride (KLOR-CON M) extended release tablet 40 mEq, PRN   Or  potassium bicarb-citric acid (EFFER-K) effervescent tablet 40 mEq, PRN   Or  potassium chloride 10 mEq/100 mL IVPB (Peripheral Line), PRN  fat emulsion (INTRALIPID/NUTRILIPID) 20 % infusion 250 mL, Q MWF  PN-Adult 2-in-1 Central Line (Custom), Continuous TPN  morphine (PF) injection 2 mg, Q4H PRN  pantoprazole (PROTONIX) 40 mg in sodium chloride (PF) 0.9 % 10 mL injection, Daily  enoxaparin (LOVENOX) injection 40 mg, Daily  lactated ringers IV soln infusion, Continuous  piperacillin-tazobactam (ZOSYN) 4,500 mg in sodium chloride 0.9 % 100 mL IVPB (vial-mate), Q8H  sodium chloride flush 0.9 % injection 5-40 mL, 2 times per day  sodium chloride flush 0.9 % injection 5-40 mL, PRN  0.9 % sodium chloride infusion, PRN         Data Review  CBC:   Lab Results   Component Value Date/Time    WBC 7.7 01/02/2024 02:56 AM

## 2024-01-02 NOTE — PROGRESS NOTES
Comprehensive Nutrition Assessment    Type and Reason for Visit:  Reassess    Nutrition Recommendations/Plan:   Continue current TPN, as tolerated. Current PN regimen meets 100% energy and protein needs  Continue inpatient monitoring     Malnutrition Assessment:  Malnutrition Status:  At risk for malnutrition (Comment) (altered GI function requiring TPN support) (12/29/23 1209)    Context:  Chronic Illness     Findings of the 6 clinical characteristics of malnutrition:  Energy Intake:  Mild decrease in energy intake (Comment) (NPO and TPN off since admit)  Weight Loss:  Unable to assess     Body Fat Loss:  Unable to assess     Muscle Mass Loss:  Unable to assess    Fluid Accumulation:  No significant fluid accumulation     Strength:  Not Performed    Nutrition Assessment:    Pt status improving and awaiting a bed on IM to transfer out of ICU. Remains on TPN for nutrition support. Minmal ostomy output but 2 midline EC most of her output. Recommend replace K+ and continue TPN for nutrition support    Nutrition Related Findings:    N/V subsided, A&Ox4, hypoactive BS, hypokalemia, 2 midline fistula with watery green output,  colostomy+small loose output, Wound Type: Surgical Incision       Current Nutrition Intake & Therapies:    Average Meal Intake: NPO (minimal ice chips)  Average Supplements Intake: NPO  Current Parenteral Nutrition Orders:  Type and Formula: 2-in-1 Custom (Dextrose 300 g, AA 75 g)   Lipids: 250ml, Three times weekly (20%)  Duration: Continuous  Rate/Volume: 62.5 = 1500 ml/d  Current PN Order Provides: at goal  Goal PN Orders Provides: 1534 kana, 75 g AA    Anthropometric Measures:  Height: 154.9 cm (5' 1\")  Ideal Body Weight (IBW): 105 lbs (48 kg)    Admission Body Weight: 60.3 kg (132 lb 15 oz) (12/28)  Current Body Weight: 60.3 kg (132 lb 15 oz), 126.6 % IBW. Weight Source: Stated (12/28)  Current BMI (kg/m2): 25.1  Usual Body Weight: 76.2 kg (167 lb 15.9 oz) (at PCP in Feb 2023)  % Weight

## 2024-01-02 NOTE — CARE COORDINATION
Met with patient at bedside per her request.  She is voicing an interest in pursuing a discharge plan to home.  A list of [facility] agencies is reviewed. Patient agreeable to any that will service her geographic area.  She specifically named [facility], [facility], [facility] and [facility].  Referrals called to all 4 agencies.  All but [facility] are out of service area.  [facility] will review for clinical acceptance and respond with determination.  KENYA Antunez RN  [facility] Case Management  [phone]    Referral also sent to Luis with [facility] for benefit check on angely TON supplies/formula.    KENYA Antunez RN  [facility] Case Management  [phone]     9

## 2024-01-02 NOTE — PROGRESS NOTES
12/28/2023    SEDRATE 11 09/29/2015       Radiology:      Microbiology:   Rapid SARS-CoV-2: Negative  Rapid influenza: Negative  Respiratory panel: Negative  Blood cultures 12/28/2023: Pansensitive Klebsiella pneumoniae in 4 of 4 bottles  Blood culture 12/29/2023: Negative so far  PICC tip culture 12/29/2023: >15 colonies Klebsiella pneumonia, mixed MSSA    No results for input(s): \"PROCAL\" in the last 72 hours.      ASSESSMENT:  Probable ascending cholangitis  CLABSI secondary to PICC infection.  PICC was removed.  Tip culture positive for Klebsiella and MSSA  Klebsiella septicemia associated to the above-improving.  Repeat blood cultures are negative so far  Sepsis with septic shock associated to the above-resolved  Fever associated to the above-improved  Leukopenia secondary to sepsis-resolved  Chronic enterocutaneous fistula.  It seems to be proving now has she is on TPN  Crohn disease    PLAN:  Continue Zosyn alone  Check final PICC tip culture  If repeat blood cultures are negative she can have a new PICC by the end of this week  Appreciate GI input.  MRI/MRCP pending clinical course  We will follow with you    Spoke with nursing.    Cody Rizvi MD  9:14 AM  1/2/2024

## 2024-01-02 NOTE — PROGRESS NOTES
[facility]  PULMONARY/CRITICAL CARE PROGRESS NOTE    Patient: Maryam Santana  MRN: [id number]  : 1965    Encounter Date: 2024  Encounter Time: 2:37 PM     Date of Admission: .2023  9:52 AM    Consulting Physician:  Primary Care Physician:     Matthew Russell MD     [phone]    Reason for Consultation: Lung Nodule     Problem List:  Patient Active Problem List   Diagnosis    Ventral hernia with bowel obstruction    Crohn's disease (HCC)    History of colon cancer    Depression    Crohn's disease of small intestine with intestinal obstruction (HCC)    Septic shock (HCC)    Enterocutaneous fistula    Ascending cholangitis    Bacteremia    Hypertension     SUBJECTIVE: Patient seen and examined.  Overnight the patient had no shortness of breath, cough, increased work of breathing.    The patient is a 58 y.o. female from nursing facility with PMHx Crohn's disease on Humira, enteroccutaneous fistula, DMII, complex past abdominal surgical history who presents to the ED with fevers, chills, nausea along with abdominal pain and pain near her PICC line insertion site.      Patient was hospitalized May 2023 for several complications related to partial hysterectomy and returned later for postsurgical abscess.  Patient had underwent abdominal laparotomy with bowel resection end of May.     Patient presented to the ER febrile Tmax 22.6 °F, hypotensive with a BP of 70/40.  Labs significant for WBC 3.1 lactic 5.1, procalcitonin 47.1, , , , total bili 3.5, direct bili 2.6.  Ultrasound gallbladder showed mild hepatomegaly, status postcholecystectomy.  Patient was given 2 L 0.9% bolus, vancomycin and Zosyn.  Levophed was started at 11 mcg.     : off pressors, PICC line removed, new temporary TLC placed left IJ     : remains stable, blood cultures GNR      : blood culture growth Enterobacterales, Klebsiella, on room air, awaiting transfer to  Rah Porter, DO very much for allowing me to see this patient in consultation and follow up.    Care reviewed with nursing staff, medical and surgical specialty care, primary care and the patient's family as available. Restraints are ordered when the patient can do harm to him/herself by pulling out devices.    Leonel Dumont MD, MJEFFERY.

## 2024-01-02 NOTE — PATIENT CARE CONFERENCE
Intensive Care Daily Quality Rounding Checklist      ICU Team Members: Charge nurse and bedside nurse    ICU Day #: n/a---changed to IM 12/30    Intubation Date:  n/a    Ventilator Day #: n/a    Central Line Insertion Date: December 29        Day #: NUMBER: 5        Indication: CVCIndication: Limited/no vessels suitable for conventional peripheral access     Arterial Line Insertion Date: n/a      Day #: n/a    Temporary Hemodialysis Catheter Insertion Date: n/a      Day # n/a    DVT Prophylaxis: Lovenox    GI Prophylaxis: Protonix    Franklin Catheter Insertion Date: n/a       Day #: n/a      Indications: n/a      Continued need (if yes, reason documented and discussed with physician): n/a    Skin Issues/ Wounds and ordered treatment discussed on rounds: yes surgical wound/surgery on ostomy (wound care)    Goals/ Plans for the Day: Daily labs, continue TPN, discharge soon    Reviewed plan and goals for day with patient and/or representative: Yes

## 2024-01-03 LAB
A1AT SERPL-MCNC: 179 MG/DL (ref 90–200)
AFP SERPL-MCNC: 1 UG/L
ALBUMIN SERPL-MCNC: 3 G/DL (ref 3.5–5.2)
ALK PHOS BONE SPECIFIC: 69 U/L (ref 0–55)
ALK PHOS OTHER CALC: 0 U/L
ALK PHOSPHATASE: 300 U/L (ref 40–120)
ALKALINE PHOSPHATASE LIVER FRACTION: 231 U/L (ref 0–94)
ALP SERPL-CCNC: 300 U/L (ref 35–104)
ALT SERPL-CCNC: 60 U/L (ref 0–32)
ANION GAP SERPL CALCULATED.3IONS-SCNC: 10 MMOL/L (ref 7–16)
AST SERPL-CCNC: 62 U/L (ref 0–31)
BASOPHILS # BLD: 0 K/UL (ref 0–0.2)
BASOPHILS NFR BLD: 0 % (ref 0–2)
BILIRUB SERPL-MCNC: 1.9 MG/DL (ref 0–1.2)
BLASTS ABSOLUTE COUNT: 0.06 K/UL
BLASTS: 1 %
BUN SERPL-MCNC: 13 MG/DL (ref 6–20)
CALCIUM SERPL-MCNC: 8.7 MG/DL (ref 8.6–10.2)
CHLORIDE SERPL-SCNC: 107 MMOL/L (ref 98–107)
CO2 SERPL-SCNC: 22 MMOL/L (ref 22–29)
CREAT SERPL-MCNC: 0.8 MG/DL (ref 0.5–1)
EOSINOPHIL # BLD: 0.47 K/UL (ref 0.05–0.5)
EOSINOPHILS RELATIVE PERCENT: 7 % (ref 0–6)
ERYTHROCYTE [DISTWIDTH] IN BLOOD BY AUTOMATED COUNT: 14.2 % (ref 11.5–15)
GFR SERPL CREATININE-BSD FRML MDRD: >60 ML/MIN/1.73M2
GLUCOSE BLD-MCNC: 100 MG/DL (ref 74–99)
GLUCOSE BLD-MCNC: 119 MG/DL (ref 74–99)
GLUCOSE SERPL-MCNC: 97 MG/DL (ref 74–99)
HCT VFR BLD AUTO: 27.7 % (ref 34–48)
HGB BLD-MCNC: 9.2 G/DL (ref 11.5–15.5)
LYMPHOCYTES NFR BLD: 1.28 K/UL (ref 1.5–4)
LYMPHOCYTES RELATIVE PERCENT: 19 % (ref 20–42)
MAGNESIUM SERPL-MCNC: 2.1 MG/DL (ref 1.6–2.6)
MCH RBC QN AUTO: 31.6 PG (ref 26–35)
MCHC RBC AUTO-ENTMCNC: 33.2 G/DL (ref 32–34.5)
MCV RBC AUTO: 95.2 FL (ref 80–99.9)
METAMYELOCYTES ABSOLUTE COUNT: 0.06 K/UL (ref 0–0.12)
METAMYELOCYTES: 1 % (ref 0–1)
MICROORGANISM SPEC CULT: NORMAL
MICROORGANISM SPEC CULT: NORMAL
MONOCYTES NFR BLD: 0.47 K/UL (ref 0.1–0.95)
MONOCYTES NFR BLD: 7 % (ref 2–12)
NEUTROPHILS NFR BLD: 65 % (ref 43–80)
NEUTS SEG NFR BLD: 4.26 K/UL (ref 1.8–7.3)
NUCLEATED RED BLOOD CELLS: 1 PER 100 WBC
PHOSPHATE SERPL-MCNC: 3.9 MG/DL (ref 2.5–4.5)
PLATELET # BLD AUTO: 167 K/UL (ref 130–450)
PMV BLD AUTO: 11.8 FL (ref 7–12)
POTASSIUM SERPL-SCNC: 3.8 MMOL/L (ref 3.5–5)
PROT SERPL-MCNC: 5.8 G/DL (ref 6.4–8.3)
RBC # BLD AUTO: 2.91 M/UL (ref 3.5–5.5)
RBC # BLD: ABNORMAL 10*6/UL
SERVICE CMNT-IMP: NORMAL
SERVICE CMNT-IMP: NORMAL
SODIUM SERPL-SCNC: 139 MMOL/L (ref 132–146)
SPECIMEN DESCRIPTION: NORMAL
SPECIMEN DESCRIPTION: NORMAL
WBC OTHER # BLD: 6.6 K/UL (ref 4.5–11.5)

## 2024-01-03 PROCEDURE — 2580000003 HC RX 258: Performed by: INTERNAL MEDICINE

## 2024-01-03 PROCEDURE — 6360000002 HC RX W HCPCS: Performed by: INTERNAL MEDICINE

## 2024-01-03 PROCEDURE — 80053 COMPREHEN METABOLIC PANEL: CPT

## 2024-01-03 PROCEDURE — A4216 STERILE WATER/SALINE, 10 ML: HCPCS | Performed by: INTERNAL MEDICINE

## 2024-01-03 PROCEDURE — 84100 ASSAY OF PHOSPHORUS: CPT

## 2024-01-03 PROCEDURE — 85025 COMPLETE CBC W/AUTO DIFF WBC: CPT

## 2024-01-03 PROCEDURE — C9113 INJ PANTOPRAZOLE SODIUM, VIA: HCPCS | Performed by: INTERNAL MEDICINE

## 2024-01-03 PROCEDURE — 2060000000 HC ICU INTERMEDIATE R&B

## 2024-01-03 PROCEDURE — 2500000003 HC RX 250 WO HCPCS: Performed by: INTERNAL MEDICINE

## 2024-01-03 PROCEDURE — 82962 GLUCOSE BLOOD TEST: CPT

## 2024-01-03 PROCEDURE — 83735 ASSAY OF MAGNESIUM: CPT

## 2024-01-03 PROCEDURE — 36592 COLLECT BLOOD FROM PICC: CPT

## 2024-01-03 RX ADMIN — SODIUM CHLORIDE, PRESERVATIVE FREE 10 ML: 5 INJECTION INTRAVENOUS at 12:33

## 2024-01-03 RX ADMIN — ENOXAPARIN SODIUM 40 MG: 100 INJECTION SUBCUTANEOUS at 09:04

## 2024-01-03 RX ADMIN — POTASSIUM CHLORIDE: 2 INJECTION, SOLUTION, CONCENTRATE INTRAVENOUS at 18:09

## 2024-01-03 RX ADMIN — MORPHINE SULFATE 2 MG: 2 INJECTION, SOLUTION INTRAMUSCULAR; INTRAVENOUS at 08:33

## 2024-01-03 RX ADMIN — PIPERACILLIN AND TAZOBACTAM 4500 MG: 4; .5 INJECTION, POWDER, LYOPHILIZED, FOR SOLUTION INTRAVENOUS at 00:15

## 2024-01-03 RX ADMIN — MORPHINE SULFATE 2 MG: 2 INJECTION, SOLUTION INTRAMUSCULAR; INTRAVENOUS at 04:50

## 2024-01-03 RX ADMIN — PIPERACILLIN AND TAZOBACTAM 4500 MG: 4; .5 INJECTION, POWDER, LYOPHILIZED, FOR SOLUTION INTRAVENOUS at 16:36

## 2024-01-03 RX ADMIN — SODIUM CHLORIDE, POTASSIUM CHLORIDE, SODIUM LACTATE AND CALCIUM CHLORIDE: 600; 310; 30; 20 INJECTION, SOLUTION INTRAVENOUS at 09:10

## 2024-01-03 RX ADMIN — PIPERACILLIN AND TAZOBACTAM 4500 MG: 4; .5 INJECTION, POWDER, LYOPHILIZED, FOR SOLUTION INTRAVENOUS at 23:34

## 2024-01-03 RX ADMIN — SODIUM CHLORIDE, PRESERVATIVE FREE 40 MG: 5 INJECTION INTRAVENOUS at 09:04

## 2024-01-03 RX ADMIN — MORPHINE SULFATE 2 MG: 2 INJECTION, SOLUTION INTRAMUSCULAR; INTRAVENOUS at 00:16

## 2024-01-03 RX ADMIN — MORPHINE SULFATE 2 MG: 2 INJECTION, SOLUTION INTRAMUSCULAR; INTRAVENOUS at 16:35

## 2024-01-03 RX ADMIN — MORPHINE SULFATE 2 MG: 2 INJECTION, SOLUTION INTRAMUSCULAR; INTRAVENOUS at 20:43

## 2024-01-03 RX ADMIN — MORPHINE SULFATE 2 MG: 2 INJECTION, SOLUTION INTRAMUSCULAR; INTRAVENOUS at 12:33

## 2024-01-03 RX ADMIN — SODIUM CHLORIDE, PRESERVATIVE FREE 10 ML: 5 INJECTION INTRAVENOUS at 16:36

## 2024-01-03 RX ADMIN — Medication 10 ML: at 08:33

## 2024-01-03 RX ADMIN — SOYBEAN OIL 250 ML: 20 INJECTION, SOLUTION INTRAVENOUS at 18:08

## 2024-01-03 RX ADMIN — PIPERACILLIN AND TAZOBACTAM 4500 MG: 4; .5 INJECTION, POWDER, LYOPHILIZED, FOR SOLUTION INTRAVENOUS at 09:04

## 2024-01-03 ASSESSMENT — PAIN SCALES - GENERAL
PAINLEVEL_OUTOF10: 7
PAINLEVEL_OUTOF10: 5
PAINLEVEL_OUTOF10: 7
PAINLEVEL_OUTOF10: 6
PAINLEVEL_OUTOF10: 7
PAINLEVEL_OUTOF10: 4
PAINLEVEL_OUTOF10: 5
PAINLEVEL_OUTOF10: 7

## 2024-01-03 ASSESSMENT — PAIN - FUNCTIONAL ASSESSMENT
PAIN_FUNCTIONAL_ASSESSMENT: PREVENTS OR INTERFERES SOME ACTIVE ACTIVITIES AND ADLS
PAIN_FUNCTIONAL_ASSESSMENT: PREVENTS OR INTERFERES SOME ACTIVE ACTIVITIES AND ADLS

## 2024-01-03 ASSESSMENT — PAIN DESCRIPTION - LOCATION
LOCATION: ABDOMEN

## 2024-01-03 ASSESSMENT — PAIN DESCRIPTION - ORIENTATION
ORIENTATION: LEFT;RIGHT;MID
ORIENTATION: RIGHT;LEFT;MID

## 2024-01-03 ASSESSMENT — PAIN DESCRIPTION - ONSET: ONSET: ON-GOING

## 2024-01-03 ASSESSMENT — PAIN DESCRIPTION - PAIN TYPE
TYPE: CHRONIC PAIN
TYPE: CHRONIC PAIN

## 2024-01-03 ASSESSMENT — PAIN DESCRIPTION - DESCRIPTORS
DESCRIPTORS: DISCOMFORT;STABBING;TENDER
DESCRIPTORS: DISCOMFORT;CRAMPING;TENDER

## 2024-01-03 ASSESSMENT — PAIN DESCRIPTION - FREQUENCY: FREQUENCY: CONTINUOUS

## 2024-01-03 NOTE — PATIENT CARE CONFERENCE
Intensive Care Daily Quality Rounding Checklist        ICU Team Members: Charge nurse and bedside nurse     ICU Day #: n/a---changed to IM 12/30/23     Intubation Date:  n/a     Ventilator Day #: n/a     Central Line Insertion Date: December 29                                                    Day #: NUMBER: 6                                                    Indication: CVC Indication: Limited/no vessels suitable for conventional peripheral access      Arterial Line Insertion Date: n/a                             Day #: n/a     Temporary Hemodialysis Catheter Insertion Date: n/a                             Day # n/a     DVT Prophylaxis: Lovenox    GI Prophylaxis: Protonix     Franklin Catheter Insertion Date: n/a                                        Day #: n/a                             Indications: n/a                             Continued need (if yes, reason documented and discussed with physician): n/a     Skin Issues/ Wounds and ordered treatment discussed on rounds: yes surgical wound/surgery on ostomy (wound care)     Goals/ Plans for the Day: Daily labs, continue TPN, discharge soon once able to place PICC and discharge disposition is settled     Reviewed plan and goals for day with patient and/or representative: Yes

## 2024-01-03 NOTE — CARE COORDINATION
[facility] responded and communicated they cannot accept referral.  Subsequent call to [facility] at [phone].  Referral then faxed to intake at [phone]. Review and response was requested.  Tal Flores, MSN RN  [facility] Case Management  [phone]    Received call from Sammie at [facility].  Agency is not able to accept d/t staffing.  Subsequent referral to [facility] [phone] - spoke with Felicity; info faxed per request to [phone].  Requested review and response.  Tal Flores MSN RN  [facility] Case Management  [phone]

## 2024-01-03 NOTE — PROGRESS NOTES
[facility]  [facility]  Progress Note    SUBJECTIVE:  Chief Complaint   Patient presents with    Fever     Patient is upset today because she says that nobody will accept her at her nursing home.  Tolerating antibiotics.    Review of systems:  As stated above in the chief complaint, otherwise negative.    Medications:  Scheduled Meds:   fat emulsion  250 mL IntraVENous Q MWF    pantoprazole (PROTONIX) 40 mg in sodium chloride (PF) 0.9 % 10 mL injection  40 mg IntraVENous Daily    enoxaparin  40 mg SubCUTAneous Daily    piperacillin-tazobactam  4,500 mg IntraVENous Q8H    sodium chloride flush  5-40 mL IntraVENous 2 times per day     Continuous Infusions:   PN-Adult 2-in-1 Central Line (Custom) 62.5 mL/hr at 24 0700    lactated ringers IV soln 75 mL/hr at 24 0910    sodium chloride       PRN Meds:potassium chloride **OR** potassium alternative oral replacement **OR** potassium chloride, morphine, sodium chloride flush, sodium chloride    OBJECTIVE:  /78   Pulse 65   Temp 97.8 °F (36.6 °C) (Oral)   Resp 17   Ht 1.549 m (5' 1\")   Wt 62.6 kg (138 lb)   SpO2 98%   BMI 26.07 kg/m²   Temp  Av °F (36.7 °C)  Min: 97.3 °F (36.3 °C)  Max: 98.5 °F (36.9 °C)  Constitutional: The patient is sitting up in the chair in the ICU.  She is awake and visibly upset.  Skin: Warm and dry. No rashes were noted.   HEENT: Round and reactive pupils.  Moist mucous membranes.  No ulcerations or thrush.  Neck: Supple to movements.   Chest: No respiratory distress.  No crackles.  Cardiovascular: Heart sounds rhythmic and regular.  Abdomen: Positive bowel sounds.  Tender to palpation.  Colostomy left lower quadrant.  Fistula below umbilicus.  Extremities: No edema.  Lines: Peripheral.  Left IJ TLC 2023.  Franklin catheter.    Laboratory and Tests:  Lab Results   Component Value Date    .0 (H) 2023    CRP 0.9 (H) 2015     Lab Results   Component Value Date    SEDRATE

## 2024-01-03 NOTE — PROGRESS NOTES
[facility]  PULMONARY/CRITICAL CARE PROGRESS NOTE    Patient: Maryam Santana  MRN: [id number]  : 1965    Encounter Date: 1/3/2024  Encounter Time: 4:03 PM     Date of Admission: .2023  9:52 AM    Consulting Physician:  Primary Care Physician:     Matthew Russell MD     [phone]    Reason for Consultation: Lung Nodule     Problem List:  Patient Active Problem List   Diagnosis    Ventral hernia with bowel obstruction    Crohn's disease (HCC)    History of colon cancer    Depression    Crohn's disease of small intestine with intestinal obstruction (HCC)    Septic shock (HCC)    Enterocutaneous fistula    Ascending cholangitis    Bacteremia    Hypertension     SUBJECTIVE: Patient seen and examined.  Overnight the patient had no shortness of breath, cough, increased work of breathing.    The patient is a 58 y.o. female from nursing facility with PMHx Crohn's disease on Humira, enteroccutaneous fistula, DMII, complex past abdominal surgical history who presents to the ED with fevers, chills, nausea along with abdominal pain and pain near her PICC line insertion site.      Patient was hospitalized May 2023 for several complications related to partial hysterectomy and returned later for postsurgical abscess.  Patient had underwent abdominal laparotomy with bowel resection end of May.     Patient presented to the ER febrile Tmax 22.6 °F, hypotensive with a BP of 70/40.  Labs significant for WBC 3.1 lactic 5.1, procalcitonin 47.1, , , , total bili 3.5, direct bili 2.6.  Ultrasound gallbladder showed mild hepatomegaly, status postcholecystectomy.  Patient was given 2 L 0.9% bolus, vancomycin and Zosyn.  Levophed was started at 11 mcg.     : off pressors, PICC line removed, new temporary TLC placed left IJ     : remains stable, blood cultures GNR      : blood culture growth Enterobacterales, Klebsiella, on room air, awaiting transfer to  present with distal tip at level of SVC.   3. No pneumothorax.   4. No evidence of pneumonia or pleural effusion.         CT ABDOMEN PELVIS W IV CONTRAST Additional Contrast? None   Final Result   1. Redemonstration of postsurgical changes related to prior bowel resection   with left-sided ostomy.   2. Small loculated fluid and gas collection located in ventral abdominal   subcutaneous fat along left aspect of surgical scar.  This lesion is slightly   smaller compared to prior from September 14, 2023, yet may indicate residual   or recurrent abscess or fistula.  Repeat CT with oral contrast may be helpful   for further evaluation.  Clinical correlation recommended.   3. Diverticulosis.   4. No acute process in the abdomen or pelvis.         US GALLBLADDER RUQ   Final Result   Mild hepatomegaly. Status post cholecystectomy.         XR CHEST PORTABLE   Final Result   No acute process.           ASSESSMENT:  Severe Sepsis, Septic Shock - bacteremia/septicemia   Concern for ascending cholangitis   CLABSI - Growth of Enterobacterales, Klebsiella pneumoniae  Chronic TPN   H/O Crohn's disease with enteric cutaneous fistula   Bibasilar Atelectasis   Immunocompromised Host - on humira for Crohn's Disease  Tobacco Use  Type II Diabetes Mellitus   Hx of colon cancer - colostomy in place  Metabolic Acidosis  Lactic Acidosis  Hypoalbuminemia   Direct Hyperbilirubinemia  Transaminitis   S/P Cholecystectomy   H/O Lung Nodules - up to 6 mm in size (9/14/2023)  H/O Hysterectomy - [facility]     PLAN:  Check: serial labs   Medication Alterations: N/A  Procedures: PICC Line to be removed  Imaging: reviewed  New Consultations: N/A  VENT: N/A     Access: Left IJ TLC  - Right Arm PICC (placed prior to admission removed 12/29/2023)  Consults: OLIVE MARCH  Drips: N/A  DVT Phylaxis: Heparin   GI Prophylaxis: PPI  Nutrition: TPN  ABX: Zosyn (DAY 7/10)  Completed ABX: Vancomycin     - Appreciate GI input.  Follow up with GI- Dr. Wong in

## 2024-01-03 NOTE — PLAN OF CARE
Problem: Pain  Goal: Verbalizes/displays adequate comfort level or baseline comfort level  1/3/2024 1423 by Sarah Quick, RN  Outcome: Progressing

## 2024-01-03 NOTE — PLAN OF CARE
Problem: ABCDS Injury Assessment  Goal: Absence of physical injury  Outcome: Progressing     Problem: Discharge Planning  Goal: Discharge to home or other facility with appropriate resources  Outcome: Progressing     Problem: Pain  Goal: Verbalizes/displays adequate comfort level or baseline comfort level  Outcome: Progressing     Problem: Safety - Adult  Goal: Free from fall injury  Outcome: Progressing     Problem: Nutrition Deficit:  Goal: Optimize nutritional status  1/2/2024 2119 by Abhijeet Ortiz, RN  Outcome: Progressing  1/2/2024 1346 by Abby Dorsey RD, CNSC, LD  Flowsheets (Taken 1/2/2024 1346)  Nutrient intake appropriate for improving, restoring, or maintaining nutritional needs:   Assess nutritional status and recommend course of action   Recommend, monitor, and adjust tube feedings and TPN/PPN based on assessed needs     Problem: Skin/Tissue Integrity  Goal: Absence of new skin breakdown  Description: 1.  Monitor for areas of redness and/or skin breakdown  2.  Assess vascular access sites hourly  3.  Every 4-6 hours minimum:  Change oxygen saturation probe site  4.  Every 4-6 hours:  If on nasal continuous positive airway pressure, respiratory therapy assess nares and determine need for appliance change or resting period.  Outcome: Progressing

## 2024-01-03 NOTE — PROGRESS NOTES
PRN         Data Review  CBC:   Lab Results   Component Value Date/Time    WBC 6.6 01/03/2024 05:15 AM    RBC 2.91 01/03/2024 05:15 AM    HGB 9.2 01/03/2024 05:15 AM    HCT 27.7 01/03/2024 05:15 AM    MCV 95.2 01/03/2024 05:15 AM    MCH 31.6 01/03/2024 05:15 AM    MCHC 33.2 01/03/2024 05:15 AM    RDW 14.2 01/03/2024 05:15 AM     01/03/2024 05:15 AM    MPV 11.8 01/03/2024 05:15 AM     CMP:    Lab Results   Component Value Date/Time     01/03/2024 05:15 AM    K 3.8 01/03/2024 05:15 AM     01/03/2024 05:15 AM    CO2 22 01/03/2024 05:15 AM    BUN 13 01/03/2024 05:15 AM    CREATININE 0.8 01/03/2024 05:15 AM    GFRAA >60 10/06/2015 06:07 AM    LABGLOM >60 01/03/2024 05:15 AM    GLUCOSE 97 01/03/2024 05:15 AM    PROT 5.8 01/03/2024 05:15 AM    LABALBU 3.0 01/03/2024 05:15 AM    CALCIUM 8.7 01/03/2024 05:15 AM    BILITOT 1.9 01/03/2024 05:15 AM    ALKPHOS 300 01/03/2024 05:15 AM    AST 62 01/03/2024 05:15 AM    ALT 60 01/03/2024 05:15 AM     Hepatic Function Panel:    Lab Results   Component Value Date/Time    ALKPHOS 300 01/03/2024 05:15 AM    ALT 60 01/03/2024 05:15 AM    AST 62 01/03/2024 05:15 AM    PROT 5.8 01/03/2024 05:15 AM    BILITOT 1.9 01/03/2024 05:15 AM    BILIDIR 3.1 12/29/2023 11:45 AM    IBILI 0.5 12/29/2023 11:45 AM    LABALBU 3.0 01/03/2024 05:15 AM     No components found for: \"CHLPL\"  No results found for: \"TRIG\"  No results found for: \"HDL\"  No results found for: \"LDLCALC\"  No results found for: \"LABVLDL\"   PT/INR:    Lab Results   Component Value Date/Time    PROTIME 13.7 12/29/2023 01:10 PM    INR 1.2 12/29/2023 01:10 PM     IRON:    Lab Results   Component Value Date/Time    IRON 16 12/29/2023 01:10 PM     Iron Saturation:  No components found for: \"PERCENTFE\"  FERRITIN:    Lab Results   Component Value Date/Time    FERRITIN 2,336 12/29/2023 01:10 PM         Assessment:     Enterocutaneous fistula  Nausea  HX of IBD- no treatment since 2015  Mild hepatomegaly  Recent bowel  resection May/June 2023 at [place]  Diverticulosis  Transaminitis- possible r/t TPN  Chronic TPN  Daily tobacco use/ abuse  Septic shock  Hyperbilirubinemia   Anemia, normocytic   Ascending cholangitis ?  Gas/fluid collection possibly r/t fistula    Plan:   ALP isoenzyme pending   Wound/ostomy nurse following  Continue IV antibiotics per ID services  Continue Protonix as ordered  NPO per general surgery orders  TPN per surgery vs admitting  Supportive care  General surgery is following  Liver serology & hepatitis panel- negative thus far   Medical management per Primary care  Trend labs  Follow up with GI- Dr. Wong in [place]. Patient is established with their service  Will follow while patient remains inpatient       Note: This report was completed utilizing computer voice recognition software. Every effort has been made to ensure accuracy, however; inadvertent computerized transcription errors may be present.     Discussed with Dr. Fields   Plan per Dr. Donnie Black APRN-NP-C 1/3/2024  1:07 PM For Dr. Fields

## 2024-01-03 NOTE — PLAN OF CARE
Problem: ABCDS Injury Assessment  Goal: Absence of physical injury  1/3/2024 0103 by Vanessa Estevez APRN - CNP  Outcome: Progressing  1/2/2024 2119 by Abhijeet Ortiz RN  Outcome: Progressing     Problem: Discharge Planning  Goal: Discharge to home or other facility with appropriate resources  1/2/2024 2119 by Abhijeet Ortiz RN  Outcome: Progressing     Problem: Pain  Goal: Verbalizes/displays adequate comfort level or baseline comfort level  1/3/2024 0103 by Vanessa Estevez APRN - CNP  Outcome: Progressing  1/2/2024 2119 by Abhijeet Ortiz RN  Outcome: Progressing     Problem: Safety - Adult  Goal: Free from fall injury  1/3/2024 0103 by Vanessa Estevez APRN - CNP  Outcome: Progressing  1/2/2024 2119 by Abhijeet Ortiz RN  Outcome: Progressing     Problem: Nutrition Deficit:  Goal: Optimize nutritional status  1/3/2024 0103 by Vanessa Estevez APRN - CNP  Outcome: Progressing  1/2/2024 2119 by Abhijeet Ortiz RN  Outcome: Progressing  1/2/2024 1346 by Abby Dorsey, FLAKITO, CNSC, LD  Flowsheets (Taken 1/2/2024 1346)  Nutrient intake appropriate for improving, restoring, or maintaining nutritional needs:   Assess nutritional status and recommend course of action   Recommend, monitor, and adjust tube feedings and TPN/PPN based on assessed needs     Problem: Skin/Tissue Integrity  Goal: Absence of new skin breakdown  Description: 1.  Monitor for areas of redness and/or skin breakdown  2.  Assess vascular access sites hourly  3.  Every 4-6 hours minimum:  Change oxygen saturation probe site  4.  Every 4-6 hours:  If on nasal continuous positive airway pressure, respiratory therapy assess nares and determine need for appliance change or resting period.  1/3/2024 0103 by Vanessa Estevez APRN - CNP  Outcome: Progressing  1/2/2024 2119 by Abhijeet Ortiz RN  Outcome: Progressing

## 2024-01-03 NOTE — PROGRESS NOTES
Internal Medicine Progress Note    Patient's name: Maryam Santana  : 1965  Chief complaints (on day of admission): Fever  Admission date: 2023  Date of service: 1/3/2024   Room: [address] ICU  Primary care physician: Matthew Russell MD  Reason for visit: Follow-up for sepsis    Subjective  Maryam was seen and examined.  She was resting comfortably in a chair.  She is adamant that she wants to go home.  She is tolerating all current treatment and medications.  We are waiting for final cultures.    Review of Systems  There are no new complaints of chest pain, shortness of breath, nausea, vomiting, constipation.    Hospital Medications  Current Facility-Administered Medications   Medication Dose Route Frequency Provider Last Rate Last Admin    PN-Adult 2-in-1 Central Line (Custom)   IntraVENous Continuous TPN Abhijeet Obrien DO        PN-Adult 2-in-1 Central Line (Custom)   IntraVENous Continuous TPN Abhijeet Obrien, DO 62.5 mL/hr at 24 0700 Rate Verify at 24 0700    potassium chloride (KLOR-CON M) extended release tablet 40 mEq  40 mEq Oral PRN Abhijeet Obrien, DO        Or    potassium bicarb-citric acid (EFFER-K) effervescent tablet 40 mEq  40 mEq Oral PRN Abhijeet Obrien, DO        Or    potassium chloride 10 mEq/100 mL IVPB (Peripheral Line)  10 mEq IntraVENous PRN Abhijeet Obrien, DO        fat emulsion (INTRALIPID/NUTRILIPID) 20 % infusion 250 mL  250 mL IntraVENous Q MWF Abhijeet Obrien,    Stopped at 24 0611    morphine (PF) injection 2 mg  2 mg IntraVENous Q4H PRN Leonel Dumont MD   2 mg at 24 1233    pantoprazole (PROTONIX) 40 mg in sodium chloride (PF) 0.9 % 10 mL injection  40 mg IntraVENous Daily Leonel Dumont MD   40 mg at 24 0904    enoxaparin (LOVENOX) injection 40 mg  40 mg SubCUTAneous Daily Leonel Dumont MD   40 mg at 24 0904    lactated ringers IV soln infusion   IntraVENous Continuous Leonel Dumont MD 75 mL/hr at 24  12/29/2023    TSH 5.51 (H) 12/29/2023       XR CHEST PORTABLE   Final Result   1. Left IJ central venous catheter is present with distal tip at level of SVC.   2. Right PICC line is present with distal tip at level of SVC.   3. No pneumothorax.   4. No evidence of pneumonia or pleural effusion.         CT ABDOMEN PELVIS W IV CONTRAST Additional Contrast? None   Final Result   1. Redemonstration of postsurgical changes related to prior bowel resection   with left-sided ostomy.   2. Small loculated fluid and gas collection located in ventral abdominal   subcutaneous fat along left aspect of surgical scar.  This lesion is slightly   smaller compared to prior from September 14, 2023, yet may indicate residual   or recurrent abscess or fistula.  Repeat CT with oral contrast may be helpful   for further evaluation.  Clinical correlation recommended.   3. Diverticulosis.   4. No acute process in the abdomen or pelvis.         US GALLBLADDER RUQ   Final Result   Mild hepatomegaly. Status post cholecystectomy.         XR CHEST PORTABLE   Final Result   No acute process.             Assessment   Active Hospital Problems    Diagnosis     Ascending cholangitis [K83.09]      Priority: High    Bacteremia [R78.81]      Priority: Medium    Enterocutaneous fistula [K63.2]      Priority: Medium    Septic shock (HCC) [A41.9, R65.21]      Priority: Medium    Hypertension [I10]     Crohn's disease of small intestine with intestinal obstruction (HCC) [K50.012]     History of colon cancer [Z85.038]     Depression [F32.A]          Plan  Klebsiella bacteremia w/ ? CLABSI (PICC line) with resolved severe sepsis and septic shock  ABX per ID  Cx noted/pending  Final PICC line tip culture pending    Possible ascending cholangitis  GI following  General surgery is following-case discussed on 1/30 and they will order TPN while as an inpatient  Follow hepatic function panel    H/O Crohn's disease with enterocutaneous fistula   TPN per pharmacy--

## 2024-01-04 LAB
ALBUMIN SERPL-MCNC: 3.1 G/DL (ref 3.5–5.2)
ALP SERPL-CCNC: 293 U/L (ref 35–104)
ALT SERPL-CCNC: 64 U/L (ref 0–32)
ANION GAP SERPL CALCULATED.3IONS-SCNC: 8 MMOL/L (ref 7–16)
AST SERPL-CCNC: 64 U/L (ref 0–31)
BASOPHILS # BLD: 0.04 K/UL (ref 0–0.2)
BASOPHILS NFR BLD: 1 % (ref 0–2)
BILIRUB SERPL-MCNC: 1.7 MG/DL (ref 0–1.2)
BUN SERPL-MCNC: 13 MG/DL (ref 6–20)
CALCIUM SERPL-MCNC: 9 MG/DL (ref 8.6–10.2)
CHLORIDE SERPL-SCNC: 107 MMOL/L (ref 98–107)
CO2 SERPL-SCNC: 24 MMOL/L (ref 22–29)
CREAT SERPL-MCNC: 0.8 MG/DL (ref 0.5–1)
EOSINOPHIL # BLD: 0.43 K/UL (ref 0.05–0.5)
EOSINOPHILS RELATIVE PERCENT: 7 % (ref 0–6)
ERYTHROCYTE [DISTWIDTH] IN BLOOD BY AUTOMATED COUNT: 14.4 % (ref 11.5–15)
GFR SERPL CREATININE-BSD FRML MDRD: >60 ML/MIN/1.73M2
GLUCOSE SERPL-MCNC: 104 MG/DL (ref 74–99)
HCT VFR BLD AUTO: 28.4 % (ref 34–48)
HGB BLD-MCNC: 9.6 G/DL (ref 11.5–15.5)
IMM GRANULOCYTES # BLD AUTO: 0.06 K/UL (ref 0–0.58)
IMM GRANULOCYTES NFR BLD: 1 % (ref 0–5)
LYMPHOCYTES NFR BLD: 2.32 K/UL (ref 1.5–4)
LYMPHOCYTES RELATIVE PERCENT: 38 % (ref 20–42)
MAGNESIUM SERPL-MCNC: 1.9 MG/DL (ref 1.6–2.6)
MCH RBC QN AUTO: 32.5 PG (ref 26–35)
MCHC RBC AUTO-ENTMCNC: 33.8 G/DL (ref 32–34.5)
MCV RBC AUTO: 96.3 FL (ref 80–99.9)
MONOCYTES NFR BLD: 0.53 K/UL (ref 0.1–0.95)
MONOCYTES NFR BLD: 9 % (ref 2–12)
NEUTROPHILS NFR BLD: 45 % (ref 43–80)
NEUTS SEG NFR BLD: 2.75 K/UL (ref 1.8–7.3)
PHOSPHATE SERPL-MCNC: 3.8 MG/DL (ref 2.5–4.5)
PLATELET # BLD AUTO: 202 K/UL (ref 130–450)
PMV BLD AUTO: 11.3 FL (ref 7–12)
POTASSIUM SERPL-SCNC: 3.9 MMOL/L (ref 3.5–5)
PROT SERPL-MCNC: 6.2 G/DL (ref 6.4–8.3)
RBC # BLD AUTO: 2.95 M/UL (ref 3.5–5.5)
SODIUM SERPL-SCNC: 139 MMOL/L (ref 132–146)
WBC OTHER # BLD: 6.1 K/UL (ref 4.5–11.5)

## 2024-01-04 PROCEDURE — 2580000003 HC RX 258: Performed by: INTERNAL MEDICINE

## 2024-01-04 PROCEDURE — 6360000002 HC RX W HCPCS: Performed by: STUDENT IN AN ORGANIZED HEALTH CARE EDUCATION/TRAINING PROGRAM

## 2024-01-04 PROCEDURE — 80053 COMPREHEN METABOLIC PANEL: CPT

## 2024-01-04 PROCEDURE — 2580000003 HC RX 258: Performed by: STUDENT IN AN ORGANIZED HEALTH CARE EDUCATION/TRAINING PROGRAM

## 2024-01-04 PROCEDURE — C1751 CATH, INF, PER/CENT/MIDLINE: HCPCS

## 2024-01-04 PROCEDURE — 6360000002 HC RX W HCPCS: Performed by: INTERNAL MEDICINE

## 2024-01-04 PROCEDURE — A4216 STERILE WATER/SALINE, 10 ML: HCPCS | Performed by: INTERNAL MEDICINE

## 2024-01-04 PROCEDURE — 84100 ASSAY OF PHOSPHORUS: CPT

## 2024-01-04 PROCEDURE — 1200000000 HC SEMI PRIVATE

## 2024-01-04 PROCEDURE — C9113 INJ PANTOPRAZOLE SODIUM, VIA: HCPCS | Performed by: INTERNAL MEDICINE

## 2024-01-04 PROCEDURE — 2500000003 HC RX 250 WO HCPCS: Performed by: INTERNAL MEDICINE

## 2024-01-04 PROCEDURE — 85025 COMPLETE CBC W/AUTO DIFF WBC: CPT

## 2024-01-04 PROCEDURE — 83735 ASSAY OF MAGNESIUM: CPT

## 2024-01-04 PROCEDURE — 76937 US GUIDE VASCULAR ACCESS: CPT

## 2024-01-04 PROCEDURE — 2500000003 HC RX 250 WO HCPCS: Performed by: STUDENT IN AN ORGANIZED HEALTH CARE EDUCATION/TRAINING PROGRAM

## 2024-01-04 PROCEDURE — 36569 INSJ PICC 5 YR+ W/O IMAGING: CPT

## 2024-01-04 RX ORDER — SODIUM CHLORIDE 0.9 % (FLUSH) 0.9 %
5-40 SYRINGE (ML) INJECTION PRN
Status: DISCONTINUED | OUTPATIENT
Start: 2024-01-04 | End: 2024-01-06 | Stop reason: HOSPADM

## 2024-01-04 RX ORDER — DEXTROSE MONOHYDRATE 100 MG/ML
INJECTION, SOLUTION INTRAVENOUS CONTINUOUS
Status: DISCONTINUED | OUTPATIENT
Start: 2024-01-04 | End: 2024-01-04

## 2024-01-04 RX ORDER — PIPERACILLIN SODIUM, TAZOBACTAM SODIUM 4; .5 G/20ML; G/20ML
4500 INJECTION, POWDER, LYOPHILIZED, FOR SOLUTION INTRAVENOUS EVERY 8 HOURS
DISCHARGE
Start: 2024-01-04 | End: 2024-01-05 | Stop reason: HOSPADM

## 2024-01-04 RX ORDER — SODIUM CHLORIDE 0.9 % (FLUSH) 0.9 %
5-40 SYRINGE (ML) INJECTION EVERY 12 HOURS SCHEDULED
Status: DISCONTINUED | OUTPATIENT
Start: 2024-01-04 | End: 2024-01-06 | Stop reason: HOSPADM

## 2024-01-04 RX ORDER — LIDOCAINE HYDROCHLORIDE 10 MG/ML
5 INJECTION, SOLUTION EPIDURAL; INFILTRATION; INTRACAUDAL; PERINEURAL ONCE
Status: COMPLETED | OUTPATIENT
Start: 2024-01-04 | End: 2024-01-04

## 2024-01-04 RX ORDER — SODIUM CHLORIDE 9 MG/ML
INJECTION, SOLUTION INTRAVENOUS PRN
Status: DISCONTINUED | OUTPATIENT
Start: 2024-01-04 | End: 2024-01-06 | Stop reason: HOSPADM

## 2024-01-04 RX ORDER — HEPARIN 100 UNIT/ML
3 SYRINGE INTRAVENOUS EVERY 12 HOURS SCHEDULED
Status: DISCONTINUED | OUTPATIENT
Start: 2024-01-04 | End: 2024-01-06 | Stop reason: HOSPADM

## 2024-01-04 RX ORDER — HEPARIN 100 UNIT/ML
3 SYRINGE INTRAVENOUS PRN
Status: DISCONTINUED | OUTPATIENT
Start: 2024-01-04 | End: 2024-01-06 | Stop reason: HOSPADM

## 2024-01-04 RX ORDER — OXYCODONE HYDROCHLORIDE 5 MG/1
5 TABLET ORAL 2 TIMES DAILY
Qty: 6 TABLET | Refills: 0 | Status: SHIPPED | OUTPATIENT
Start: 2024-01-04 | End: 2024-01-09

## 2024-01-04 RX ADMIN — MORPHINE SULFATE 2 MG: 2 INJECTION, SOLUTION INTRAMUSCULAR; INTRAVENOUS at 12:54

## 2024-01-04 RX ADMIN — Medication 10 ML: at 09:00

## 2024-01-04 RX ADMIN — Medication 10 ML: at 20:16

## 2024-01-04 RX ADMIN — LIDOCAINE HYDROCHLORIDE 0.5 ML: 10 SOLUTION INTRAVENOUS at 14:31

## 2024-01-04 RX ADMIN — PIPERACILLIN AND TAZOBACTAM 4500 MG: 4; .5 INJECTION, POWDER, LYOPHILIZED, FOR SOLUTION INTRAVENOUS at 08:34

## 2024-01-04 RX ADMIN — SODIUM CHLORIDE, PRESERVATIVE FREE 10 ML: 5 INJECTION INTRAVENOUS at 14:32

## 2024-01-04 RX ADMIN — MORPHINE SULFATE 2 MG: 2 INJECTION, SOLUTION INTRAMUSCULAR; INTRAVENOUS at 00:43

## 2024-01-04 RX ADMIN — POTASSIUM CHLORIDE: 2 INJECTION, SOLUTION, CONCENTRATE INTRAVENOUS at 18:36

## 2024-01-04 RX ADMIN — MORPHINE SULFATE 2 MG: 2 INJECTION, SOLUTION INTRAMUSCULAR; INTRAVENOUS at 08:41

## 2024-01-04 RX ADMIN — MORPHINE SULFATE 2 MG: 2 INJECTION, SOLUTION INTRAMUSCULAR; INTRAVENOUS at 16:18

## 2024-01-04 RX ADMIN — MORPHINE SULFATE 2 MG: 2 INJECTION, SOLUTION INTRAMUSCULAR; INTRAVENOUS at 04:45

## 2024-01-04 RX ADMIN — SODIUM CHLORIDE, PRESERVATIVE FREE 40 MG: 5 INJECTION INTRAVENOUS at 08:35

## 2024-01-04 RX ADMIN — SODIUM CHLORIDE, PRESERVATIVE FREE 10 ML: 5 INJECTION INTRAVENOUS at 20:18

## 2024-01-04 RX ADMIN — PIPERACILLIN AND TAZOBACTAM 4500 MG: 4; .5 INJECTION, POWDER, LYOPHILIZED, FOR SOLUTION INTRAVENOUS at 23:53

## 2024-01-04 RX ADMIN — PIPERACILLIN AND TAZOBACTAM 4500 MG: 4; .5 INJECTION, POWDER, LYOPHILIZED, FOR SOLUTION INTRAVENOUS at 16:17

## 2024-01-04 RX ADMIN — MORPHINE SULFATE 2 MG: 2 INJECTION, SOLUTION INTRAMUSCULAR; INTRAVENOUS at 20:17

## 2024-01-04 ASSESSMENT — PAIN DESCRIPTION - LOCATION
LOCATION: ABDOMEN

## 2024-01-04 ASSESSMENT — PAIN SCALES - GENERAL
PAINLEVEL_OUTOF10: 8
PAINLEVEL_OUTOF10: 6
PAINLEVEL_OUTOF10: 2
PAINLEVEL_OUTOF10: 7
PAINLEVEL_OUTOF10: 8
PAINLEVEL_OUTOF10: 8
PAINLEVEL_OUTOF10: 7

## 2024-01-04 ASSESSMENT — PAIN DESCRIPTION - DESCRIPTORS
DESCRIPTORS: ACHING
DESCRIPTORS: CRAMPING
DESCRIPTORS: ACHING;DISCOMFORT
DESCRIPTORS: ACHING

## 2024-01-04 ASSESSMENT — PAIN DESCRIPTION - ORIENTATION: ORIENTATION: RIGHT;LEFT

## 2024-01-04 NOTE — FLOWSHEET NOTE
Received call from Kindra who is admissions director at [facility]. Patient's authorization is not finalized yet, so patient cannot go there tonight. She states the patient's insurance changed as of January 1 and so the authorization is not finalized tonight. Will let Dr. Porter and KEV know.

## 2024-01-04 NOTE — PROGRESS NOTES
Intensive Care Daily Quality Rounding Checklist      ICU Team Members: Charge nurse and bedside nurse    ICU Day #: N/A- changed to IM 12/30/23    Intubation Date: N/A    Ventilator Day #: N/A    Central Line Insertion Date: December 29        Day #: NUMBER: 7        Indication: CVCIndication: Total Parental Nutrition (TPN)     Arterial Line Insertion Date: N/A      Day #: N/A    Temporary Hemodialysis Catheter Insertion Date: N/A      Day # N/A    DVT Prophylaxis: Lovenox    GI Prophylaxis: Protonix    Franklin Catheter Insertion Date: N/A       Day #: N/A      Indications: N/A      Continued need (if yes, reason documented and discussed with physician): N/A    Skin Issues/ Wounds and ordered treatment discussed on rounds: Yes, wound care consulted, surgery also on.    Goals/ Plans for the Day: Daily labs, discharge planning, timing of new PICC per ID, continue TPN, check to downgrade to GMF    Reviewed plan and goals for day with patient and/or representative: yes

## 2024-01-04 NOTE — PROGRESS NOTES
PROGRESS NOTE        Patient Presents with/Seen in Consultation For      Reason for Consult: positive blood cultures, Hx of crohns and enterocutaneous fistula   CHIEF COMPLAINT:  fevers    Subjective:     Patient seen sitting up in bed, in NAD. States she is going back to facility today. Taking minimal ice chips. Reports abdominal pain is improved. Denies any N/V. All questions answered.     Review of Systems  Aside from what was mentioned in the PMH and HPI, essentially unremarkable, all others negative.    Objective:     /72   Pulse 77   Temp 98.1 °F (36.7 °C) (Oral)   Resp 22   Ht 1.549 m (5' 1\")   Wt 62.6 kg (138 lb)   SpO2 98%   BMI 26.07 kg/m²     General appearance: alert, awake, sitting in bed, and cooperative  Eyes: conjunctivae/corneas clear. PERRL.  Lungs: clear to auscultation bilaterally  Heart: regular rate and rhythm, no murmur, 2+ pulses;  without edema  Abdomen: soft, tender to palpitation, ostomy- intact. Drsg D/I  Extremities: extremities without edema  Pulses: 2+ and symmetric  Skin: Skin color, texture, turgor normal.   Neurologic: Grossly normal    PN-Adult 2-in-1 Central Line (Custom), Continuous TPN  potassium chloride (KLOR-CON M) extended release tablet 40 mEq, PRN   Or  potassium bicarb-citric acid (EFFER-K) effervescent tablet 40 mEq, PRN   Or  potassium chloride 10 mEq/100 mL IVPB (Peripheral Line), PRN  fat emulsion (INTRALIPID/NUTRILIPID) 20 % infusion 250 mL, Q MWF  morphine (PF) injection 2 mg, Q4H PRN  pantoprazole (PROTONIX) 40 mg in sodium chloride (PF) 0.9 % 10 mL injection, Daily  enoxaparin (LOVENOX) injection 40 mg, Daily  lactated ringers IV soln infusion, Continuous  piperacillin-tazobactam (ZOSYN) 4,500 mg in sodium chloride 0.9 % 100 mL IVPB (vial-mate), Q8H  sodium chloride flush 0.9 % injection 5-40 mL, 2 times per day  sodium chloride flush 0.9 % injection 5-40 mL, PRN  0.9 % sodium chloride infusion, PRN         Data Review  CBC:   Lab Results   Component  Value Date/Time    WBC 6.6 01/03/2024 05:15 AM    RBC 2.91 01/03/2024 05:15 AM    HGB 9.2 01/03/2024 05:15 AM    HCT 27.7 01/03/2024 05:15 AM    MCV 95.2 01/03/2024 05:15 AM    MCH 31.6 01/03/2024 05:15 AM    MCHC 33.2 01/03/2024 05:15 AM    RDW 14.2 01/03/2024 05:15 AM     01/03/2024 05:15 AM    MPV 11.8 01/03/2024 05:15 AM     CMP:    Lab Results   Component Value Date/Time     01/03/2024 05:15 AM    K 3.8 01/03/2024 05:15 AM     01/03/2024 05:15 AM    CO2 22 01/03/2024 05:15 AM    BUN 13 01/03/2024 05:15 AM    CREATININE 0.8 01/03/2024 05:15 AM    GFRAA >60 10/06/2015 06:07 AM    LABGLOM >60 01/03/2024 05:15 AM    GLUCOSE 97 01/03/2024 05:15 AM    PROT 5.8 01/03/2024 05:15 AM    LABALBU 3.0 01/03/2024 05:15 AM    CALCIUM 8.7 01/03/2024 05:15 AM    BILITOT 1.9 01/03/2024 05:15 AM    ALKPHOS 300 01/03/2024 05:15 AM    AST 62 01/03/2024 05:15 AM    ALT 60 01/03/2024 05:15 AM     Hepatic Function Panel:    Lab Results   Component Value Date/Time    ALKPHOS 300 01/03/2024 05:15 AM    ALT 60 01/03/2024 05:15 AM    AST 62 01/03/2024 05:15 AM    PROT 5.8 01/03/2024 05:15 AM    BILITOT 1.9 01/03/2024 05:15 AM    BILIDIR 3.1 12/29/2023 11:45 AM    IBILI 0.5 12/29/2023 11:45 AM    LABALBU 3.0 01/03/2024 05:15 AM     No components found for: \"CHLPL\"  No results found for: \"TRIG\"  No results found for: \"HDL\"  No results found for: \"LDLCALC\"  No results found for: \"LABVLDL\"   PT/INR:    Lab Results   Component Value Date/Time    PROTIME 13.7 12/29/2023 01:10 PM    INR 1.2 12/29/2023 01:10 PM     IRON:    Lab Results   Component Value Date/Time    IRON 16 12/29/2023 01:10 PM     Iron Saturation:  No components found for: \"PERCENTFE\"  FERRITIN:    Lab Results   Component Value Date/Time    FERRITIN 2,336 12/29/2023 01:10 PM         Assessment:     Enterocutaneous fistula  Nausea  HX of IBD- no treatment since 2015  Mild hepatomegaly  Recent bowel resection May/June 2023 at

## 2024-01-04 NOTE — PROGRESS NOTES
Pt complaining of irritation of ostomy site d/t adhesive. Messaged wound care via perfect serve. Awaiting response.

## 2024-01-04 NOTE — PLAN OF CARE
Problem: ABCDS Injury Assessment  Goal: Absence of physical injury  Outcome: Progressing     Problem: Discharge Planning  Goal: Discharge to home or other facility with appropriate resources  Outcome: Progressing     Problem: Pain  Goal: Verbalizes/displays adequate comfort level or baseline comfort level  1/4/2024 0050 by Abhijeet Ortiz, RN  Outcome: Progressing  Flowsheets (Taken 1/3/2024 1635 by Sarah Quick, RN)  Verbalizes/displays adequate comfort level or baseline comfort level: Assess pain using appropriate pain scale  1/3/2024 1423 by Sarah Quick, RN  Outcome: Progressing     Problem: Safety - Adult  Goal: Free from fall injury  Outcome: Progressing     Problem: Nutrition Deficit:  Goal: Optimize nutritional status  Outcome: Progressing     Problem: Skin/Tissue Integrity  Goal: Absence of new skin breakdown  Description: 1.  Monitor for areas of redness and/or skin breakdown  2.  Assess vascular access sites hourly  3.  Every 4-6 hours minimum:  Change oxygen saturation probe site  4.  Every 4-6 hours:  If on nasal continuous positive airway pressure, respiratory therapy assess nares and determine need for appliance change or resting period.  Outcome: Progressing

## 2024-01-04 NOTE — PROCEDURES
PICC    Catheter insertion date: 1/4/2024     Product Number:  [id number]   Lot No: [id number]   Gauge: 18 X 2   Lumen: DOUBLE, 5.5 FR   L Cephalic    Vein Diameter: 0.60 CM   Arm circumference at insertion site: 25 CM   Catheter Length: 43 CM   Internal Length: 42 CM   External Catheter Length: 1 CM   Ultrasound Used: YES  RIDGE [place] confirms PICC tip is placed in the lower 1/3 of the SVC or at the Cavoatrial junction.  Floor nurse notified PICC is okay to use.   : FLAVIO ODELL-BC  ASSISTANT: GENARO DONIS RN

## 2024-01-04 NOTE — PLAN OF CARE
Problem: ABCDS Injury Assessment  Goal: Absence of physical injury  Outcome: Progressing     Problem: Discharge Planning  Goal: Discharge to home or other facility with appropriate resources  Outcome: Progressing     Problem: Pain  Goal: Verbalizes/displays adequate comfort level or baseline comfort level  Outcome: Progressing     Problem: Safety - Adult  Goal: Free from fall injury  Outcome: Progressing     Problem: Nutrition Deficit:  Goal: Optimize nutritional status  Outcome: Progressing     Problem: Skin/Tissue Integrity  Goal: Absence of new skin breakdown  Description: 1.  Monitor for areas of redness and/or skin breakdown  2.  Assess vascular access sites hourly  3.  Every 4-6 hours minimum:  Change oxygen saturation probe site  4.  Every 4-6 hours:  If on nasal continuous positive airway pressure, respiratory therapy assess nares and determine need for appliance change or resting period.  Outcome: Progressing

## 2024-01-04 NOTE — PROGRESS NOTES
[facility]  [facility]  Progress Note    SUBJECTIVE:  Chief Complaint   Patient presents with    Fever     No new problems reported.  Tolerating antibiotics.  No fever.  She    Review of systems:  As stated above in the chief complaint, otherwise negative.    Medications:  Scheduled Meds:   fat emulsion  250 mL IntraVENous Q MWF    pantoprazole (PROTONIX) 40 mg in sodium chloride (PF) 0.9 % 10 mL injection  40 mg IntraVENous Daily    enoxaparin  40 mg SubCUTAneous Daily    piperacillin-tazobactam  4,500 mg IntraVENous Q8H    sodium chloride flush  5-40 mL IntraVENous 2 times per day     Continuous Infusions:   PN-Adult 2-in-1 Central Line (Custom)      PN-Adult 2-in-1 Central Line (Custom) 62.5 mL/hr at 24 0527    lactated ringers IV soln Stopped (24 1803)    sodium chloride       PRN Meds:potassium chloride **OR** potassium alternative oral replacement **OR** potassium chloride, morphine, sodium chloride flush, sodium chloride    OBJECTIVE:  BP (!) 132/94   Pulse (!) 47   Temp 97.5 °F (36.4 °C) (Oral)   Resp 17   Ht 1.549 m (5' 1\")   Wt 62.6 kg (138 lb)   SpO2 100%   BMI 26.07 kg/m²   Temp  Av °F (36.7 °C)  Min: 97.5 °F (36.4 °C)  Max: 98.1 °F (36.7 °C)  Constitutional: The patient is sitting up in the chair in the ICU.  Seems to having a better day today.  Skin: Warm and dry. No rashes were noted.   HEENT: Round and reactive pupils.  Moist mucous membranes.  No ulcerations or thrush.  Neck: Supple to movements.   Chest: No respiratory distress.  No crackles.  Cardiovascular: Heart sounds rhythmic and regular.  Abdomen: Positive bowel sounds.  Tender to palpation.  Colostomy left lower quadrant.  Fistula below umbilicus.  Extremities: No edema.  Lines: Peripheral.  Left IJ TLC 2023.  Franklin catheter.    Laboratory and Tests:  Lab Results   Component Value Date    .0 (H) 2023    CRP 0.9 (H) 2015     Lab Results   Component Value Date

## 2024-01-04 NOTE — CARE COORDINATION
Felicity from [facility] communicated agency cannot accept.  Physician notes and orders are reviewed.  Patient to be discharged back to [facility].  Facility can accept.  JAMIR ricks scheduled for 6 PM.  Bedside nurse and facility liaison notified.  Bedside nurse will notify patient (undergoing PICC insertion at this time).  Did request facility continue work to assist patient with out patient surgical follow up and planning for transition to home.  Liaison acknowledged request.  DAGO initiated, envelope completed with demos, transport forms.  Tal Flores, MSN RN  [facility] Case Management  [phone]

## 2024-01-04 NOTE — PROGRESS NOTES
Internal Medicine Progress Note    Patient's name: Maryam Santana  : 1965  Chief complaints (on day of admission): Fever  Admission date: 2023  Date of service: 2024   Room: [address]  Primary care physician: Matthew Russell MD  Reason for visit: Follow-up for sepsis    Subjective  Maryam was seen and examined.  She was seen in a chair in her room with her head on a pillow/table.  She is feeling well.  She was very upset that she has to go back to the nursing facility.  I explained to her that she is on TPN and IV antibiotics.  I also explained that there is no physician to order her TPN at home and that is why she needs to go to the nursing home.    Review of Systems  There are no new complaints of chest pain, shortness of breath, nausea, vomiting, constipation.    Hospital Medications  Current Facility-Administered Medications   Medication Dose Route Frequency Provider Last Rate Last Admin    PN-Adult 2-in-1 Central Line (Custom)   IntraVENous Continuous TPN Kang Edwards MD        PN-Adult 2-in-1 Central Line (Custom)   IntraVENous Continuous TPN Abhijeet Obrien, DO 62.5 mL/hr at 24 0527 Rate Verify at 24 0527    potassium chloride (KLOR-CON M) extended release tablet 40 mEq  40 mEq Oral PRN Abhijeet Obrien, DO        Or    potassium bicarb-citric acid (EFFER-K) effervescent tablet 40 mEq  40 mEq Oral PRN Abhijeet Obrien, DO        Or    potassium chloride 10 mEq/100 mL IVPB (Peripheral Line)  10 mEq IntraVENous PRN Abhijeet Obrien, DO        fat emulsion (INTRALIPID/NUTRILIPID) 20 % infusion 250 mL  250 mL IntraVENous Q MWF Abhijeet Obrien, DO   Stopped at 24 0608    morphine (PF) injection 2 mg  2 mg IntraVENous Q4H PRN Leonel Dumont MD   2 mg at 24 0841    pantoprazole (PROTONIX) 40 mg in sodium chloride (PF) 0.9 % 10 mL injection  40 mg IntraVENous Daily Leonel Dumont MD   40 mg at 24 0835    enoxaparin (LOVENOX) injection 40 mg  40 mg  SubCUTAneous Daily Leonel Dumont MD   40 mg at 01/03/24 0904    lactated ringers IV soln infusion   IntraVENous Continuous Leonel Dumont MD   Stopped at 01/03/24 1803    piperacillin-tazobactam (ZOSYN) 4,500 mg in sodium chloride 0.9 % 100 mL IVPB (vial-mate)  4,500 mg IntraVENous Q8H Leonel Dumont MD 25 mL/hr at 01/04/24 0834 4,500 mg at 01/04/24 0834    sodium chloride flush 0.9 % injection 5-40 mL  5-40 mL IntraVENous 2 times per day Leonel Dumont MD   10 mL at 01/03/24 0833    sodium chloride flush 0.9 % injection 5-40 mL  5-40 mL IntraVENous PRN Leonel Dumont MD   10 mL at 01/03/24 1636    0.9 % sodium chloride infusion   IntraVENous PRN Leonel Dumont MD           PRN Medications  potassium chloride **OR** potassium alternative oral replacement **OR** potassium chloride, morphine, sodium chloride flush, sodium chloride    Objective  Most Recent Recorded Vitals  BP (!) 132/94   Pulse (!) 47   Temp 97.5 °F (36.4 °C) (Oral)   Resp 17   Ht 1.549 m (5' 1\")   Wt 62.6 kg (138 lb)   SpO2 100%   BMI 26.07 kg/m²   I/O last 3 completed shifts:  In: 6382.2 [I.V.:2652; IV Piggyback:599.9]  Out: -   No intake/output data recorded.    Physical Exam:  General: AAO to person/place/time/purpose, NAD, no labored breathing  Eyes: conjunctivae/corneas clear, sclera non icteric  Skin: color/texture/turgor normal, no rashes or lesions, central line in the left IJ  Lungs: CTAB, no retractions/use of accessory muscles, no vocal fremitus, no rhonchi, no crackle, no rales  Heart: regular rate, regular rhythm, no murmur  Abdomen: soft, NT, bowel sounds normal, ostomy leaking  Extremities: atraumatic, no edema  Neurologic: cranial nerves 2-12 grossly intact, no slurred speech    Most Recent Labs  Lab Results   Component Value Date    WBC 6.1 01/04/2024    HGB 9.6 (L) 01/04/2024    HCT 28.4 (L) 01/04/2024     01/04/2024     01/04/2024    K 3.9 01/04/2024     01/04/2024    CREATININE

## 2024-01-04 NOTE — DISCHARGE SUMMARY
Internal Medicine Discharge Summary    NAME: Maryam Santana :  1965  MRN:  [id number] PCP:Matthew Russell MD    ADMITTED: 2023   DISCHARGED: 2024 10:00 PM    ADMITTING PHYSICIAN: Rah Porter DO    PCP: Matthew Russell MD    CONSULTANT(S):   IP CONSULT TO GENERAL SURGERY  IP CONSULT TO CRITICAL CARE  IP CONSULT TO INFECTIOUS DISEASES  IP CONSULT TO INTERNAL MEDICINE  IP CONSULT TO PHARMACY  IP CONSULT TO GI  IP CONSULT TO DIETITIAN  IP CONSULT TO SOCIAL WORK  IP CONSULT TO HOME CARE NEEDS     ADMITTING DIAGNOSIS:   Transaminitis [R74.01]  Septic shock (HCC) [A41.9, R65.21]     Please see H&P for further details    DISCHARGE DIAGNOSES:   Active Hospital Problems    Diagnosis     Ascending cholangitis [K83.09]      Priority: High    Bacteremia [R78.81]      Priority: Medium    Enterocutaneous fistula [K63.2]      Priority: Medium    Septic shock (HCC) [A41.9, R65.21]      Priority: Medium    Hypertension [I10]     Crohn's disease of small intestine with intestinal obstruction (HCC) [K50.012]     History of colon cancer [Z85.038]     Depression [F32.A]        BRIEF HISTORY OF PRESENT ILLNESS: Maryam Santana is a 58 y.o. female patient of Matthew Russell MD who  has a past medical history of Cancer (HCC), Depression, Fissure, anal, Hernia, and Hypertension. who originally had concerns including Fever. at presentation on 2023, and was found to have Transaminitis [R74.01]  Septic shock (HCC) [A41.9, R65.21] after workup.    Please see H&P for further details.    HOSPITAL COURSE:   The patient presented to the hospital with the chief complaint of Fever  . The patient was admitted to the hospital.     Klebsiella bacteremia w/ ? CLABSI (PICC line) with resolved severe sepsis and septic shock  ABX per ID  Cx noted/pending  Final PICC line tip culture noted/pending  A second PICC line was placed on     Unlikely ascending cholangitis  GI following  General surgery is following-case  prominent retroperitoneal lymph nodes appearing similar in size and number.  Urinary bladder is unremarkable. No evidence of pneumonia in the visualized lower lung fields.  Large degenerative subchondral cysts are associated with both hips.     1. Redemonstration of postsurgical changes related to prior bowel resection with left-sided ostomy. 2. Small loculated fluid and gas collection located in ventral abdominal subcutaneous fat along left aspect of surgical scar.  This lesion is slightly smaller compared to prior from September 14, 2023, yet may indicate residual or recurrent abscess or fistula.  Repeat CT with oral contrast may be helpful for further evaluation.  Clinical correlation recommended. 3. Diverticulosis. 4. No acute process in the abdomen or pelvis.     US GALLBLADDER RUQ    Result Date: 12/28/2023  EXAMINATION: RIGHT UPPER QUADRANT ULTRASOUND 12/28/2023 12:05 pm COMPARISON: None. HISTORY: ORDERING SYSTEM PROVIDED HISTORY: transaminitis, elevated bilirubin TECHNOLOGIST PROVIDED HISTORY: Reason for exam:->transaminitis, elevated bilirubin What reading provider will be dictating this exam?->CRC FINDINGS: LIVER:  The liver is mildly enlarged with normal echogenicity without evidence of intrahepatic biliary ductal dilatation. BILIARY SYSTEM:  Gallbladder is surgically absent. Common bile duct is within normal limits measuring 5.3 mm. RIGHT KIDNEY: The right kidney is grossly unremarkable without evidence of hydronephrosis. PANCREAS:  Visualized portions of the pancreas are unremarkable. OTHER: No evidence of right upper quadrant ascites.     Mild hepatomegaly. Status post cholecystectomy.     XR CHEST PORTABLE    Result Date: 12/28/2023  EXAMINATION: ONE XRAY VIEW OF THE CHEST 12/28/2023 11:28 am COMPARISON: None. HISTORY: ORDERING SYSTEM PROVIDED HISTORY: fever unkown origin TECHNOLOGIST PROVIDED HISTORY: Reason for exam:->fever unkown origin FINDINGS: The lungs are without acute focal process.  There is

## 2024-01-05 VITALS
SYSTOLIC BLOOD PRESSURE: 108 MMHG | TEMPERATURE: 99.3 F | HEART RATE: 53 BPM | RESPIRATION RATE: 18 BRPM | HEIGHT: 61 IN | OXYGEN SATURATION: 100 % | BODY MASS INDEX: 26.06 KG/M2 | WEIGHT: 138 LBS | DIASTOLIC BLOOD PRESSURE: 73 MMHG

## 2024-01-05 LAB
ALBUMIN SERPL-MCNC: 3.2 G/DL (ref 3.5–5.2)
ALP SERPL-CCNC: 298 U/L (ref 35–104)
ALT SERPL-CCNC: 70 U/L (ref 0–32)
ANION GAP SERPL CALCULATED.3IONS-SCNC: 11 MMOL/L (ref 7–16)
AST SERPL-CCNC: 70 U/L (ref 0–31)
ATYPICAL LYMPHOCYTE ABSOLUTE COUNT: 0.27 K/UL (ref 0–0.46)
ATYPICAL LYMPHOCYTES: 4 % (ref 0–4)
BASOPHILS # BLD: 0.05 K/UL (ref 0–0.2)
BASOPHILS NFR BLD: 1 % (ref 0–2)
BILIRUB SERPL-MCNC: 1.7 MG/DL (ref 0–1.2)
BUN SERPL-MCNC: 15 MG/DL (ref 6–20)
CALCIUM SERPL-MCNC: 9 MG/DL (ref 8.6–10.2)
CHLORIDE SERPL-SCNC: 105 MMOL/L (ref 98–107)
CO2 SERPL-SCNC: 24 MMOL/L (ref 22–29)
CREAT SERPL-MCNC: 0.8 MG/DL (ref 0.5–1)
EOSINOPHIL # BLD: 0.32 K/UL (ref 0.05–0.5)
EOSINOPHILS RELATIVE PERCENT: 5 % (ref 0–6)
ERYTHROCYTE [DISTWIDTH] IN BLOOD BY AUTOMATED COUNT: 14.6 % (ref 11.5–15)
GFR SERPL CREATININE-BSD FRML MDRD: >60 ML/MIN/1.73M2
GLUCOSE SERPL-MCNC: 107 MG/DL (ref 74–99)
HCT VFR BLD AUTO: 29.4 % (ref 34–48)
HGB BLD-MCNC: 9.6 G/DL (ref 11.5–15.5)
LYMPHOCYTES NFR BLD: 1.78 K/UL (ref 1.5–4)
LYMPHOCYTES RELATIVE PERCENT: 29 % (ref 20–42)
MAGNESIUM SERPL-MCNC: 2.2 MG/DL (ref 1.6–2.6)
MCH RBC QN AUTO: 31.7 PG (ref 26–35)
MCHC RBC AUTO-ENTMCNC: 32.7 G/DL (ref 32–34.5)
MCV RBC AUTO: 97 FL (ref 80–99.9)
MONOCYTES NFR BLD: 0.22 K/UL (ref 0.1–0.95)
MONOCYTES NFR BLD: 4 % (ref 2–12)
NEUTROPHILS NFR BLD: 57 % (ref 43–80)
NEUTS SEG NFR BLD: 3.56 K/UL (ref 1.8–7.3)
PHOSPHATE SERPL-MCNC: 4.5 MG/DL (ref 2.5–4.5)
PLATELET # BLD AUTO: 217 K/UL (ref 130–450)
PMV BLD AUTO: 11.1 FL (ref 7–12)
POTASSIUM SERPL-SCNC: 3.9 MMOL/L (ref 3.5–5)
PROT SERPL-MCNC: 6.5 G/DL (ref 6.4–8.3)
RBC # BLD AUTO: 3.03 M/UL (ref 3.5–5.5)
RBC # BLD: ABNORMAL 10*6/UL
RBC # BLD: ABNORMAL 10*6/UL
SODIUM SERPL-SCNC: 140 MMOL/L (ref 132–146)
WBC OTHER # BLD: 6.2 K/UL (ref 4.5–11.5)

## 2024-01-05 PROCEDURE — 2580000003 HC RX 258: Performed by: SPECIALIST

## 2024-01-05 PROCEDURE — 6370000000 HC RX 637 (ALT 250 FOR IP): Performed by: INTERNAL MEDICINE

## 2024-01-05 PROCEDURE — 2580000003 HC RX 258: Performed by: INTERNAL MEDICINE

## 2024-01-05 PROCEDURE — 84100 ASSAY OF PHOSPHORUS: CPT

## 2024-01-05 PROCEDURE — 83735 ASSAY OF MAGNESIUM: CPT

## 2024-01-05 PROCEDURE — 2500000003 HC RX 250 WO HCPCS: Performed by: INTERNAL MEDICINE

## 2024-01-05 PROCEDURE — 6360000002 HC RX W HCPCS

## 2024-01-05 PROCEDURE — 6360000002 HC RX W HCPCS: Performed by: INTERNAL MEDICINE

## 2024-01-05 PROCEDURE — 80053 COMPREHEN METABOLIC PANEL: CPT

## 2024-01-05 PROCEDURE — 2500000003 HC RX 250 WO HCPCS

## 2024-01-05 PROCEDURE — 2580000003 HC RX 258

## 2024-01-05 PROCEDURE — 85025 COMPLETE CBC W/AUTO DIFF WBC: CPT

## 2024-01-05 PROCEDURE — 6360000002 HC RX W HCPCS: Performed by: SPECIALIST

## 2024-01-05 RX ORDER — PANTOPRAZOLE SODIUM 40 MG/1
40 TABLET, DELAYED RELEASE ORAL
Status: DISCONTINUED | OUTPATIENT
Start: 2024-01-06 | End: 2024-01-06 | Stop reason: HOSPADM

## 2024-01-05 RX ORDER — LEVOTHYROXINE SODIUM 0.07 MG/1
75 TABLET ORAL DAILY
Status: DISCONTINUED | OUTPATIENT
Start: 2024-01-05 | End: 2024-01-06 | Stop reason: HOSPADM

## 2024-01-05 RX ORDER — OXYCODONE HYDROCHLORIDE 5 MG/1
5 TABLET ORAL EVERY 4 HOURS PRN
Status: DISCONTINUED | OUTPATIENT
Start: 2024-01-05 | End: 2024-01-06 | Stop reason: HOSPADM

## 2024-01-05 RX ORDER — BUSPIRONE HYDROCHLORIDE 10 MG/1
10 TABLET ORAL 2 TIMES DAILY
Status: DISCONTINUED | OUTPATIENT
Start: 2024-01-05 | End: 2024-01-06 | Stop reason: HOSPADM

## 2024-01-05 RX ORDER — MIRTAZAPINE 15 MG/1
15 TABLET, FILM COATED ORAL NIGHTLY
Status: DISCONTINUED | OUTPATIENT
Start: 2024-01-05 | End: 2024-01-06 | Stop reason: HOSPADM

## 2024-01-05 RX ADMIN — Medication 10 ML: at 20:50

## 2024-01-05 RX ADMIN — LEVOTHYROXINE SODIUM 75 MCG: 75 TABLET ORAL at 09:12

## 2024-01-05 RX ADMIN — MORPHINE SULFATE 2 MG: 2 INJECTION, SOLUTION INTRAMUSCULAR; INTRAVENOUS at 00:22

## 2024-01-05 RX ADMIN — BUSPIRONE HYDROCHLORIDE 10 MG: 10 TABLET ORAL at 20:49

## 2024-01-05 RX ADMIN — APIXABAN 5 MG: 5 TABLET, FILM COATED ORAL at 20:49

## 2024-01-05 RX ADMIN — SODIUM CHLORIDE, POTASSIUM CHLORIDE, SODIUM LACTATE AND CALCIUM CHLORIDE: 600; 310; 30; 20 INJECTION, SOLUTION INTRAVENOUS at 15:02

## 2024-01-05 RX ADMIN — BUSPIRONE HYDROCHLORIDE 10 MG: 10 TABLET ORAL at 09:07

## 2024-01-05 RX ADMIN — OXYCODONE HYDROCHLORIDE 5 MG: 5 TABLET ORAL at 16:52

## 2024-01-05 RX ADMIN — MIRTAZAPINE 15 MG: 15 TABLET, FILM COATED ORAL at 20:49

## 2024-01-05 RX ADMIN — APIXABAN 5 MG: 5 TABLET, FILM COATED ORAL at 09:07

## 2024-01-05 RX ADMIN — SOYBEAN OIL 250 ML: 20 INJECTION, SOLUTION INTRAVENOUS at 18:49

## 2024-01-05 RX ADMIN — SODIUM CHLORIDE, PRESERVATIVE FREE 10 ML: 5 INJECTION INTRAVENOUS at 09:14

## 2024-01-05 RX ADMIN — MORPHINE SULFATE 2 MG: 2 INJECTION, SOLUTION INTRAMUSCULAR; INTRAVENOUS at 04:26

## 2024-01-05 RX ADMIN — POTASSIUM CHLORIDE: 2 INJECTION, SOLUTION, CONCENTRATE INTRAVENOUS at 18:49

## 2024-01-05 RX ADMIN — PIPERACILLIN AND TAZOBACTAM 4500 MG: 4; .5 INJECTION, POWDER, LYOPHILIZED, FOR SOLUTION INTRAVENOUS at 16:57

## 2024-01-05 RX ADMIN — SODIUM CHLORIDE, PRESERVATIVE FREE 10 ML: 5 INJECTION INTRAVENOUS at 20:49

## 2024-01-05 RX ADMIN — OXYCODONE HYDROCHLORIDE 5 MG: 5 TABLET ORAL at 09:07

## 2024-01-05 RX ADMIN — PIPERACILLIN AND TAZOBACTAM 4500 MG: 4; .5 INJECTION, POWDER, LYOPHILIZED, FOR SOLUTION INTRAVENOUS at 09:12

## 2024-01-05 RX ADMIN — HEPARIN 300 UNITS: 100 SYRINGE at 20:50

## 2024-01-05 ASSESSMENT — PAIN DESCRIPTION - DESCRIPTORS
DESCRIPTORS: ACHING;CRAMPING
DESCRIPTORS: ACHING;DISCOMFORT
DESCRIPTORS: DULL;NAGGING
DESCRIPTORS: ACHING;DISCOMFORT

## 2024-01-05 ASSESSMENT — PAIN SCALES - GENERAL
PAINLEVEL_OUTOF10: 7
PAINLEVEL_OUTOF10: 0
PAINLEVEL_OUTOF10: 6
PAINLEVEL_OUTOF10: 0
PAINLEVEL_OUTOF10: 0
PAINLEVEL_OUTOF10: 6
PAINLEVEL_OUTOF10: 6
PAINLEVEL_OUTOF10: 0

## 2024-01-05 ASSESSMENT — PAIN DESCRIPTION - FREQUENCY: FREQUENCY: INTERMITTENT

## 2024-01-05 ASSESSMENT — PAIN - FUNCTIONAL ASSESSMENT
PAIN_FUNCTIONAL_ASSESSMENT: ACTIVITIES ARE NOT PREVENTED
PAIN_FUNCTIONAL_ASSESSMENT: PREVENTS OR INTERFERES SOME ACTIVE ACTIVITIES AND ADLS

## 2024-01-05 ASSESSMENT — PAIN DESCRIPTION - ORIENTATION
ORIENTATION: RIGHT;LEFT;MID
ORIENTATION: RIGHT;LEFT;MID
ORIENTATION: MID

## 2024-01-05 ASSESSMENT — PAIN DESCRIPTION - PAIN TYPE
TYPE: CHRONIC PAIN
TYPE: CHRONIC PAIN

## 2024-01-05 ASSESSMENT — PAIN DESCRIPTION - ONSET: ONSET: GRADUAL

## 2024-01-05 ASSESSMENT — PAIN DESCRIPTION - LOCATION
LOCATION: ABDOMEN

## 2024-01-05 NOTE — PROGRESS NOTES
Jose served : Heart rate is 46.  Complaining of pain and has morphine ordered for which causes bradycardia.  Allergy to Dilaudid.  Can you order something else?

## 2024-01-05 NOTE — PROGRESS NOTES
PROGRESS NOTE        Patient Presents with/Seen in Consultation For      Reason for Consult: positive blood cultures, Hx of crohns and enterocutaneous fistula   CHIEF COMPLAINT:  fevers    Subjective:     Patient seen sitting in chair, no current complaints. Waiting discharge. POC d/w pt.     Review of Systems  Aside from what was mentioned in the PMH and HPI, essentially unremarkable, all others negative.    Objective:     /62   Pulse 51   Temp 98.2 °F (36.8 °C) (Oral)   Resp 12   Ht 1.549 m (5' 1\")   Wt 62.6 kg (138 lb)   SpO2 100%   BMI 26.07 kg/m²     General appearance: alert, awake, sitting in bed, and cooperative  Eyes: conjunctivae/corneas clear. PERRL.  Lungs: clear to auscultation bilaterally  Heart: regular rate and rhythm, no murmur, 2+ pulses;  without edema  Abdomen: soft, tender to palpitation, ostomy- intact. Drsg D/I  Extremities: extremities without edema  Pulses: 2+ and symmetric  Skin: Skin color, texture, turgor normal.   Neurologic: Grossly normal    oxyCODONE (ROXICODONE) immediate release tablet 5 mg, Q4H PRN  apixaban (ELIQUIS) tablet 5 mg, BID  busPIRone (BUSPAR) tablet 10 mg, BID  levothyroxine (SYNTHROID) tablet 75 mcg, Daily  mirtazapine (REMERON) tablet 15 mg, Nightly  [START ON 1/6/2024] pantoprazole (PROTONIX) tablet 40 mg, QAM AC  PN-Adult 2-in-1 Central Line (Custom), Continuous TPN  [START ON 1/6/2024] PN-Adult 2-in-1 Central Line (Custom), Continuous TPN  [START ON 1/7/2024] PN-Adult 2-in-1 Central Line (Custom), Continuous TPN  PN-Adult 2-in-1 Central Line (Custom), Continuous TPN  sodium chloride flush 0.9 % injection 5-40 mL, 2 times per day  sodium chloride flush 0.9 % injection 5-40 mL, PRN  0.9 % sodium chloride infusion, PRN  heparin (PF) 100 UNIT/ML injection 300 Units, 2 times per day  heparin (PF) 100 UNIT/ML injection 300 Units, PRN  potassium chloride (KLOR-CON M) extended release tablet 40 mEq, PRN   Or  potassium bicarb-citric acid (EFFER-K) effervescent

## 2024-01-05 NOTE — PROGRESS NOTES
Patient in chair. States changed own pouch and family is bringing in supplies  Does not want help  Francesca Rock, CNS, Wound Care

## 2024-01-05 NOTE — PLAN OF CARE
Problem: ABCDS Injury Assessment  Goal: Absence of physical injury  1/5/2024 1742 by Nadja Hirsch RN  Outcome: Adequate for Discharge  1/5/2024 1039 by Andra Calderon RN  Outcome: Progressing     Problem: Discharge Planning  Goal: Discharge to home or other facility with appropriate resources  Outcome: Adequate for Discharge     Problem: Pain  Goal: Verbalizes/displays adequate comfort level or baseline comfort level  1/5/2024 1742 by Nadja Hirsch RN  Outcome: Adequate for Discharge  1/5/2024 1039 by Andra Calderon RN  Outcome: Progressing     Problem: Safety - Adult  Goal: Free from fall injury  1/5/2024 1742 by Nadja Hirsch RN  Outcome: Adequate for Discharge  1/5/2024 1039 by Andra Calderon RN  Outcome: Progressing     Problem: Nutrition Deficit:  Goal: Optimize nutritional status  1/5/2024 1742 by Nadja Hirsch RN  Outcome: Adequate for Discharge  1/5/2024 1039 by Andra Calderon, RN  Outcome: Progressing     Problem: Skin/Tissue Integrity  Goal: Absence of new skin breakdown  1/5/2024 1742 by Nadja Hirsch RN  Outcome: Adequate for Discharge  1/5/2024 1039 by Andra Calderon, RN  Outcome: Progressing      Lab: 161

## 2024-01-05 NOTE — PLAN OF CARE
Problem: ABCDS Injury Assessment  Goal: Absence of physical injury  1/5/2024 1039 by Andra Calderon RN  Outcome: Progressing  1/5/2024 0031 by Carmencita Carroll RN  Outcome: Progressing     Problem: Pain  Goal: Verbalizes/displays adequate comfort level or baseline comfort level  1/5/2024 1039 by Andra Calderon RN  Outcome: Progressing  1/5/2024 0031 by Carmencita Carroll RN  Outcome: Progressing  Flowsheets  Taken 1/5/2024 0000  Verbalizes/displays adequate comfort level or baseline comfort level: Assess pain using appropriate pain scale  Taken 1/4/2024 2000  Verbalizes/displays adequate comfort level or baseline comfort level: Assess pain using appropriate pain scale     Problem: Safety - Adult  Goal: Free from fall injury  1/5/2024 1039 by Andra Calderon RN  Outcome: Progressing  1/5/2024 0031 by Carmencita Carroll RN  Outcome: Progressing     Problem: Nutrition Deficit:  Goal: Optimize nutritional status  Outcome: Progressing     Problem: Skin/Tissue Integrity  Goal: Absence of new skin breakdown  Description: 1.  Monitor for areas of redness and/or skin breakdown  2.  Assess vascular access sites hourly  3.  Every 4-6 hours minimum:  Change oxygen saturation probe site  4.  Every 4-6 hours:  If on nasal continuous positive airway pressure, respiratory therapy assess nares and determine need for appliance change or resting period.  1/5/2024 1039 by Andra Calderon RN  Outcome: Progressing  1/5/2024 0031 by Carmencita Carroll RN  Outcome: Progressing

## 2024-01-05 NOTE — PATIENT CARE CONFERENCE
Intensive Care Daily Quality Rounding Checklist        ICU Team Members: Charge nurse and bedside nurse     ICU Day #: N/A- changed to IM 12/30/23     Intubation Date: N/A     Ventilator Day #: N/A     Central Line Insertion Date: January 4 (PICC)                                                    Day #: NUMBER: 2                                                    Indication: CVCIndication: Total Parental Nutrition (TPN)      Arterial Line Insertion Date: N/A                             Day #: N/A     Temporary Hemodialysis Catheter Insertion Date: N/A                             Day # N/A     DVT Prophylaxis: Lovenox    GI Prophylaxis: Protonix     [place] Catheter Insertion Date: N/A                                        Day #: N/A                             Indications: N/A                             Continued need (if yes, reason documented and discussed with physician): N/A     Skin Issues/ Wounds and ordered treatment discussed on rounds: Yes, wound care consulted, surgery also on.     Goals/ Plans for the Day: Daily labs, continue TPN, Awaiting authorization for discharge     Reviewed plan and goals for day with patient and/or representative:

## 2024-01-05 NOTE — PLAN OF CARE
Problem: ABCDS Injury Assessment  Goal: Absence of physical injury  1/5/2024 0031 by Carmencita Carroll RN  Outcome: Progressing  1/4/2024 1857 by Chaparrita Moreno RN  Outcome: Progressing     Problem: Discharge Planning  Goal: Discharge to home or other facility with appropriate resources  Recent Flowsheet Documentation  Taken 1/4/2024 2000 by Carmencita Carroll RN  Discharge to home or other facility with appropriate resources: Identify discharge learning needs (meds, wound care, etc)  1/4/2024 1857 by Chaparrita Moreno RN  Outcome: Progressing     Problem: Pain  Goal: Verbalizes/displays adequate comfort level or baseline comfort level  1/5/2024 0031 by Carmencita Carroll RN  Outcome: Progressing  Flowsheets  Taken 1/5/2024 0000  Verbalizes/displays adequate comfort level or baseline comfort level: Assess pain using appropriate pain scale  Taken 1/4/2024 2000  Verbalizes/displays adequate comfort level or baseline comfort level: Assess pain using appropriate pain scale  1/4/2024 1857 by Chaparrita Moreno RN  Outcome: Progressing     Problem: Safety - Adult  Goal: Free from fall injury  1/5/2024 0031 by Carmencita Carroll RN  Outcome: Progressing  1/4/2024 1857 by Chaparrita Moreno RN  Outcome: Progressing     Problem: Nutrition Deficit:  Goal: Optimize nutritional status  1/4/2024 1857 by Chaparrita Moreno RN  Outcome: Progressing     Problem: Skin/Tissue Integrity  Goal: Absence of new skin breakdown  Description: 1.  Monitor for areas of redness and/or skin breakdown  2.  Assess vascular access sites hourly  3.  Every 4-6 hours minimum:  Change oxygen saturation probe site  4.  Every 4-6 hours:  If on nasal continuous positive airway pressure, respiratory therapy assess nares and determine need for appliance change or resting period.  1/5/2024 0031 by Carmencita Carroll RN  Outcome: Progressing  1/4/2024 1857 by Chaparrita Moreno RN  Outcome: Progressing

## 2024-01-05 NOTE — PROGRESS NOTES
Internal Medicine Progress Note    Patient's name: Maryam Santana  : 1965  Chief complaints (on day of admission): Fever  Admission date: 2023  Date of service: 2024   Room: [address] ICU  Primary care physician: Matthew Russell MD  Reason for visit: Follow-up for sepsis    Subjective  Maryam was seen and examined.  She was sitting in chair.  She was resting comfortably.  She denied new complaints or issues.  She is tolerating her TPN.  She is tolerating antibiotics.  Her heart rate was low and has been slow throughout her hospital stay.  She told me that it is \"always this way\".  She has absolutely no symptoms.  She still wants to go home on discharge.  I promised her that I would talk to her PCP at the nursing home regarding getting her home eventually from the nursing home.    Review of Systems  There are no new complaints of chest pain, shortness of breath, nausea, vomiting, constipation.    Hospital Medications  Current Facility-Administered Medications   Medication Dose Route Frequency Provider Last Rate Last Admin    oxyCODONE (ROXICODONE) immediate release tablet 5 mg  5 mg Oral Q4H PRN Rah Porter DO   5 mg at 24 0907    apixaban (ELIQUIS) tablet 5 mg  5 mg Oral BID Rah Porter, DO   5 mg at 24 0907    busPIRone (BUSPAR) tablet 10 mg  10 mg Oral BID Rah Porter, DO   10 mg at 24 0907    levothyroxine (SYNTHROID) tablet 75 mcg  75 mcg Oral Daily Rah Porter, DO   75 mcg at 24 0912    mirtazapine (REMERON) tablet 15 mg  15 mg Oral Nightly Rah Porter DO        [START ON 2024] pantoprazole (PROTONIX) tablet 40 mg  40 mg Oral QAM AC Rah Porter DO        PN-Adult 2-in-1 Central Line (Custom)   IntraVENous Continuous TPN Edilberto Tim DO        [START ON 2024] PN-Adult 2-in-1 Central Line (Custom)   IntraVENous Continuous TPN Kang Edwards MD        [START ON 2024] PN-Adult 2-in-1 Central Line (Custom)   IntraVENous  prior from September 14, 2023, yet may indicate residual   or recurrent abscess or fistula.  Repeat CT with oral contrast may be helpful   for further evaluation.  Clinical correlation recommended.   3. Diverticulosis.   4. No acute process in the abdomen or pelvis.         US GALLBLADDER RUQ   Final Result   Mild hepatomegaly. Status post cholecystectomy.         XR CHEST PORTABLE   Final Result   No acute process.             Assessment   Active Hospital Problems    Diagnosis     Ascending cholangitis [K83.09]      Priority: High    Bacteremia [R78.81]      Priority: Medium    Enterocutaneous fistula [K63.2]      Priority: Medium    Septic shock (HCC) [A41.9, R65.21]      Priority: Medium    Hypertension [I10]     Crohn's disease of small intestine with intestinal obstruction (HCC) [K50.012]     History of colon cancer [Z85.038]     Depression [F32.A]          Plan  Klebsiella bacteremia w/ ? CLABSI (PICC line) with resolved severe sepsis and septic shock  ABX per ID  Cx noted/pending  Final PICC line tip culture noted/pending  A second PICC line was placed on 1/4    Unlikely ascending cholangitis  GI following  General surgery is following-case discussed on 1/30 and they will order TPN while as an inpatient  Follow hepatic function panel    H/O Crohn's disease with enterocutaneous fistula   TPN per pharmacy-- pt on chronically-pharmacy to order  She will need another PICC line prior to discharge when okay with ID  Sp ostomy placement previously after bowel perforation from hysterectomy according to the patient  GI following  Chronically immunocompromised on Humira  Wound care nurse to help manage ostomy    Chronic bradycardia:  Metoprolol has been discontinued  Resume Synthroid  She is completely asymptomatic    Follow labs   DVT prophylaxis  Please see orders for further management and care.  Discharge plan: Back to SNF when medically stable because no one else would give her TPN at home-possibly later today if

## 2024-01-05 NOTE — PROGRESS NOTES
Spoke to Julia at [facility] authorization came through, facility accepted patient. [facility] to  patient at 8 pm.

## 2024-01-05 NOTE — PROGRESS NOTES
[facility]  [facility]  Progress Note    SUBJECTIVE:  Chief Complaint   Patient presents with    Fever     The patient feels about the same.  Still admitted to having some abdominal pain.  Tolerating antibiotics.  No fever.    Review of systems:  As stated above in the chief complaint, otherwise negative.    Medications:  Scheduled Meds:   apixaban  5 mg Oral BID    busPIRone  10 mg Oral BID    levothyroxine  75 mcg Oral Daily    mirtazapine  15 mg Oral Nightly    [START ON 2024] pantoprazole  40 mg Oral QAM AC    sodium chloride flush  5-40 mL IntraVENous 2 times per day    heparin flush  3 mL IntraVENous 2 times per day    fat emulsion  250 mL IntraVENous Q MWF    piperacillin-tazobactam  4,500 mg IntraVENous Q8H    sodium chloride flush  5-40 mL IntraVENous 2 times per day     Continuous Infusions:   PN-Adult 2-in-1 Central Line (Custom) 62.5 mL/hr at 24 0625    sodium chloride      lactated ringers IV soln Stopped (24 1803)    sodium chloride       PRN Meds:oxyCODONE, sodium chloride flush, sodium chloride, heparin flush, potassium chloride **OR** potassium alternative oral replacement **OR** potassium chloride, sodium chloride flush, sodium chloride    OBJECTIVE:  /62   Pulse 51   Temp 98.2 °F (36.8 °C) (Oral)   Resp 12   Ht 1.549 m (5' 1\")   Wt 62.6 kg (138 lb)   SpO2 100%   BMI 26.07 kg/m²   Temp  Av.9 °F (36.6 °C)  Min: 97.7 °F (36.5 °C)  Max: 98.2 °F (36.8 °C)  Constitutional: The patient is sitting up in the chair in the ICU.  No distress.  Skin: Warm and dry. No rashes were noted.   HEENT: Round and reactive pupils.  Moist mucous membranes.  No ulcerations or thrush.  Neck: Supple to movements.   Chest: No respiratory distress.  No crackles.  Cardiovascular: Heart sounds rhythmic and regular.  Abdomen: Positive bowel sounds.  Tender to palpation.  Colostomy left lower quadrant.  Fistula below umbilicus.  Extremities: No edema.  Lines:

## 2024-01-22 LAB
ABSOLUTE IMMATURE GRANULOCYTE: <0.03 K/UL (ref 0–0.58)
ALBUMIN SERPL-MCNC: 3.7 G/DL (ref 3.5–5.2)
ALP BLD-CCNC: 221 U/L (ref 35–104)
ALT SERPL-CCNC: 67 U/L (ref 0–32)
ANION GAP SERPL CALCULATED.3IONS-SCNC: 13 MMOL/L (ref 7–16)
AST SERPL-CCNC: 81 U/L (ref 0–31)
BASOPHILS ABSOLUTE: 0.03 K/UL (ref 0–0.2)
BASOPHILS RELATIVE PERCENT: 1 % (ref 0–2)
BILIRUB SERPL-MCNC: 0.7 MG/DL (ref 0–1.2)
BUN BLDV-MCNC: 20 MG/DL (ref 6–20)
CALCIUM SERPL-MCNC: 9.2 MG/DL (ref 8.6–10.2)
CHLORIDE BLD-SCNC: 102 MMOL/L (ref 98–107)
CO2: 22 MMOL/L (ref 22–29)
CREAT SERPL-MCNC: 0.7 MG/DL (ref 0.5–1)
EOSINOPHILS ABSOLUTE: 0.32 K/UL (ref 0.05–0.5)
EOSINOPHILS RELATIVE PERCENT: 5 % (ref 0–6)
GFR SERPL CREATININE-BSD FRML MDRD: >60 ML/MIN/1.73M2
GLUCOSE BLD-MCNC: 99 MG/DL (ref 74–99)
HCT VFR BLD CALC: 35.8 % (ref 34–48)
HEMOGLOBIN: 12 G/DL (ref 11.5–15.5)
IMMATURE GRANULOCYTES: 0 % (ref 0–5)
LYMPHOCYTES ABSOLUTE: 2.24 K/UL (ref 1.5–4)
LYMPHOCYTES RELATIVE PERCENT: 34 % (ref 20–42)
MAGNESIUM: 1.9 MG/DL (ref 1.6–2.6)
MCH RBC QN AUTO: 32.5 PG (ref 26–35)
MCHC RBC AUTO-ENTMCNC: 33.5 G/DL (ref 32–34.5)
MCV RBC AUTO: 97 FL (ref 80–99.9)
MONOCYTES ABSOLUTE: 0.45 K/UL (ref 0.1–0.95)
MONOCYTES RELATIVE PERCENT: 7 % (ref 2–12)
NEUTROPHILS ABSOLUTE: 3.57 K/UL (ref 1.8–7.3)
NEUTROPHILS RELATIVE PERCENT: 54 % (ref 43–80)
PDW BLD-RTO: 14.7 % (ref 11.5–15)
PHOSPHORUS: 3.4 MG/DL (ref 2.5–4.5)
PLATELET # BLD: 223 K/UL (ref 130–450)
PMV BLD AUTO: 11.1 FL (ref 7–12)
POTASSIUM SERPL-SCNC: 4.5 MMOL/L (ref 3.5–5)
RBC # BLD: 3.69 M/UL (ref 3.5–5.5)
SODIUM BLD-SCNC: 137 MMOL/L (ref 132–146)
TOTAL PROTEIN: 7.3 G/DL (ref 6.4–8.3)
TRIGL SERPL-MCNC: 149 MG/DL
WBC # BLD: 6.6 K/UL (ref 4.5–11.5)

## 2024-01-29 LAB
ABSOLUTE IMMATURE GRANULOCYTE: <0.03 K/UL (ref 0–0.58)
ALBUMIN SERPL-MCNC: 3.5 G/DL (ref 3.5–5.2)
ALP BLD-CCNC: 218 U/L (ref 35–104)
ALT SERPL-CCNC: 48 U/L (ref 0–32)
ANION GAP SERPL CALCULATED.3IONS-SCNC: 14 MMOL/L (ref 7–16)
AST SERPL-CCNC: 54 U/L (ref 0–31)
BASOPHILS ABSOLUTE: 0.04 K/UL (ref 0–0.2)
BASOPHILS RELATIVE PERCENT: 1 % (ref 0–2)
BILIRUB SERPL-MCNC: 0.6 MG/DL (ref 0–1.2)
BUN BLDV-MCNC: 20 MG/DL (ref 6–20)
CALCIUM SERPL-MCNC: 9.3 MG/DL (ref 8.6–10.2)
CHLORIDE BLD-SCNC: 103 MMOL/L (ref 98–107)
CO2: 22 MMOL/L (ref 22–29)
CREAT SERPL-MCNC: 0.6 MG/DL (ref 0.5–1)
EOSINOPHILS ABSOLUTE: 0.35 K/UL (ref 0.05–0.5)
EOSINOPHILS RELATIVE PERCENT: 6 % (ref 0–6)
GFR SERPL CREATININE-BSD FRML MDRD: >60 ML/MIN/1.73M2
GLUCOSE BLD-MCNC: 107 MG/DL (ref 74–99)
HCT VFR BLD CALC: 35.7 % (ref 34–48)
HEMOGLOBIN: 12 G/DL (ref 11.5–15.5)
IMMATURE GRANULOCYTES: 0 % (ref 0–5)
LYMPHOCYTES ABSOLUTE: 1.77 K/UL (ref 1.5–4)
LYMPHOCYTES RELATIVE PERCENT: 30 % (ref 20–42)
MAGNESIUM: 1.8 MG/DL (ref 1.6–2.6)
MCH RBC QN AUTO: 32.5 PG (ref 26–35)
MCHC RBC AUTO-ENTMCNC: 33.6 G/DL (ref 32–34.5)
MCV RBC AUTO: 96.7 FL (ref 80–99.9)
MONOCYTES ABSOLUTE: 0.39 K/UL (ref 0.1–0.95)
MONOCYTES RELATIVE PERCENT: 7 % (ref 2–12)
NEUTROPHILS ABSOLUTE: 3.44 K/UL (ref 1.8–7.3)
NEUTROPHILS RELATIVE PERCENT: 57 % (ref 43–80)
PDW BLD-RTO: 14.6 % (ref 11.5–15)
PHOSPHORUS: 3.3 MG/DL (ref 2.5–4.5)
PLATELET # BLD: 227 K/UL (ref 130–450)
PMV BLD AUTO: 10.6 FL (ref 7–12)
POTASSIUM SERPL-SCNC: 4.2 MMOL/L (ref 3.5–5)
RBC # BLD: 3.69 M/UL (ref 3.5–5.5)
SODIUM BLD-SCNC: 139 MMOL/L (ref 132–146)
TOTAL PROTEIN: 6.8 G/DL (ref 6.4–8.3)
TRIGL SERPL-MCNC: 152 MG/DL
WBC # BLD: 6 K/UL (ref 4.5–11.5)

## 2024-02-05 LAB
ABSOLUTE IMMATURE GRANULOCYTE: <0.03 K/UL (ref 0–0.58)
ALBUMIN SERPL-MCNC: 3.8 G/DL (ref 3.5–5.2)
ALP BLD-CCNC: 197 U/L (ref 35–104)
ALT SERPL-CCNC: 27 U/L (ref 0–32)
ANION GAP SERPL CALCULATED.3IONS-SCNC: 15 MMOL/L (ref 7–16)
AST SERPL-CCNC: 34 U/L (ref 0–31)
BASOPHILS ABSOLUTE: 0.04 K/UL (ref 0–0.2)
BASOPHILS RELATIVE PERCENT: 1 % (ref 0–2)
BILIRUB SERPL-MCNC: 0.5 MG/DL (ref 0–1.2)
BUN BLDV-MCNC: 25 MG/DL (ref 6–20)
CALCIUM SERPL-MCNC: 9.4 MG/DL (ref 8.6–10.2)
CHLORIDE BLD-SCNC: 102 MMOL/L (ref 98–107)
CO2: 22 MMOL/L (ref 22–29)
CREAT SERPL-MCNC: 0.6 MG/DL (ref 0.5–1)
EOSINOPHILS ABSOLUTE: 0.4 K/UL (ref 0.05–0.5)
EOSINOPHILS RELATIVE PERCENT: 6 % (ref 0–6)
GFR SERPL CREATININE-BSD FRML MDRD: >60 ML/MIN/1.73M2
GLUCOSE BLD-MCNC: 103 MG/DL (ref 74–99)
HCT VFR BLD CALC: 36.7 % (ref 34–48)
HEMOGLOBIN: 12.3 G/DL (ref 11.5–15.5)
IMMATURE GRANULOCYTES: 0 % (ref 0–5)
LYMPHOCYTES ABSOLUTE: 1.97 K/UL (ref 1.5–4)
LYMPHOCYTES RELATIVE PERCENT: 29 % (ref 20–42)
MAGNESIUM: 1.9 MG/DL (ref 1.6–2.6)
MCH RBC QN AUTO: 32.5 PG (ref 26–35)
MCHC RBC AUTO-ENTMCNC: 33.5 G/DL (ref 32–34.5)
MCV RBC AUTO: 96.8 FL (ref 80–99.9)
MONOCYTES ABSOLUTE: 0.43 K/UL (ref 0.1–0.95)
MONOCYTES RELATIVE PERCENT: 6 % (ref 2–12)
NEUTROPHILS ABSOLUTE: 4.06 K/UL (ref 1.8–7.3)
NEUTROPHILS RELATIVE PERCENT: 59 % (ref 43–80)
PDW BLD-RTO: 14.3 % (ref 11.5–15)
PHOSPHORUS: 3.6 MG/DL (ref 2.5–4.5)
PLATELET # BLD: 274 K/UL (ref 130–450)
PMV BLD AUTO: 10.4 FL (ref 7–12)
POTASSIUM SERPL-SCNC: 4.3 MMOL/L (ref 3.5–5)
RBC # BLD: 3.79 M/UL (ref 3.5–5.5)
SODIUM BLD-SCNC: 139 MMOL/L (ref 132–146)
TOTAL PROTEIN: 7.3 G/DL (ref 6.4–8.3)
TRIGL SERPL-MCNC: 174 MG/DL
WBC # BLD: 6.9 K/UL (ref 4.5–11.5)

## 2024-02-12 LAB
ABSOLUTE IMMATURE GRANULOCYTE: <0.03 K/UL (ref 0–0.58)
ALBUMIN SERPL-MCNC: 4.2 G/DL (ref 3.5–5.2)
ALP BLD-CCNC: 185 U/L (ref 35–104)
ALT SERPL-CCNC: 18 U/L (ref 0–32)
ANION GAP SERPL CALCULATED.3IONS-SCNC: 15 MMOL/L (ref 7–16)
AST SERPL-CCNC: 31 U/L (ref 0–31)
BASOPHILS ABSOLUTE: 0.04 K/UL (ref 0–0.2)
BASOPHILS RELATIVE PERCENT: 1 % (ref 0–2)
BILIRUB SERPL-MCNC: 0.5 MG/DL (ref 0–1.2)
BUN BLDV-MCNC: 27 MG/DL (ref 6–20)
CALCIUM SERPL-MCNC: 9.6 MG/DL (ref 8.6–10.2)
CHLORIDE BLD-SCNC: 99 MMOL/L (ref 98–107)
CO2: 22 MMOL/L (ref 22–29)
CREAT SERPL-MCNC: 0.7 MG/DL (ref 0.5–1)
EOSINOPHILS ABSOLUTE: 0.36 K/UL (ref 0.05–0.5)
EOSINOPHILS RELATIVE PERCENT: 6 % (ref 0–6)
GFR SERPL CREATININE-BSD FRML MDRD: >60 ML/MIN/1.73M2
GLUCOSE BLD-MCNC: 134 MG/DL (ref 74–99)
HCT VFR BLD CALC: 40.6 % (ref 34–48)
HEMOGLOBIN: 13.7 G/DL (ref 11.5–15.5)
IMMATURE GRANULOCYTES: 0 % (ref 0–5)
LYMPHOCYTES ABSOLUTE: 1.85 K/UL (ref 1.5–4)
LYMPHOCYTES RELATIVE PERCENT: 29 % (ref 20–42)
MAGNESIUM: 1.8 MG/DL (ref 1.6–2.6)
MCH RBC QN AUTO: 32.2 PG (ref 26–35)
MCHC RBC AUTO-ENTMCNC: 33.7 G/DL (ref 32–34.5)
MCV RBC AUTO: 95.5 FL (ref 80–99.9)
MONOCYTES ABSOLUTE: 0.46 K/UL (ref 0.1–0.95)
MONOCYTES RELATIVE PERCENT: 7 % (ref 2–12)
NEUTROPHILS ABSOLUTE: 3.62 K/UL (ref 1.8–7.3)
NEUTROPHILS RELATIVE PERCENT: 57 % (ref 43–80)
PDW BLD-RTO: 13.9 % (ref 11.5–15)
PHOSPHORUS: 3.3 MG/DL (ref 2.5–4.5)
PLATELET # BLD: 309 K/UL (ref 130–450)
PMV BLD AUTO: 10.1 FL (ref 7–12)
POTASSIUM SERPL-SCNC: 3.9 MMOL/L (ref 3.5–5)
RBC # BLD: 4.25 M/UL (ref 3.5–5.5)
SODIUM BLD-SCNC: 136 MMOL/L (ref 132–146)
TOTAL PROTEIN: 8 G/DL (ref 6.4–8.3)
TRIGL SERPL-MCNC: 159 MG/DL
WBC # BLD: 6.4 K/UL (ref 4.5–11.5)

## 2024-02-19 LAB
ABSOLUTE IMMATURE GRANULOCYTE: <0.03 K/UL (ref 0–0.58)
ALBUMIN SERPL-MCNC: 3.9 G/DL (ref 3.5–5.2)
ALP BLD-CCNC: 153 U/L (ref 35–104)
ALT SERPL-CCNC: 17 U/L (ref 0–32)
ANION GAP SERPL CALCULATED.3IONS-SCNC: 12 MMOL/L (ref 7–16)
AST SERPL-CCNC: 29 U/L (ref 0–31)
BASOPHILS ABSOLUTE: 0.02 K/UL (ref 0–0.2)
BASOPHILS RELATIVE PERCENT: 0 % (ref 0–2)
BILIRUB SERPL-MCNC: 0.4 MG/DL (ref 0–1.2)
BUN BLDV-MCNC: 25 MG/DL (ref 6–20)
CALCIUM SERPL-MCNC: 9.5 MG/DL (ref 8.6–10.2)
CHLORIDE BLD-SCNC: 99 MMOL/L (ref 98–107)
CO2: 23 MMOL/L (ref 22–29)
CREAT SERPL-MCNC: 0.7 MG/DL (ref 0.5–1)
EOSINOPHILS ABSOLUTE: 0.29 K/UL (ref 0.05–0.5)
EOSINOPHILS RELATIVE PERCENT: 4 % (ref 0–6)
GFR SERPL CREATININE-BSD FRML MDRD: >60 ML/MIN/1.73M2
GLUCOSE BLD-MCNC: 154 MG/DL (ref 74–99)
HCT VFR BLD CALC: 39.7 % (ref 34–48)
HEMOGLOBIN: 13.1 G/DL (ref 11.5–15.5)
IMMATURE GRANULOCYTES: 0 % (ref 0–5)
LYMPHOCYTES ABSOLUTE: 1.92 K/UL (ref 1.5–4)
LYMPHOCYTES RELATIVE PERCENT: 28 % (ref 20–42)
MAGNESIUM: 1.7 MG/DL (ref 1.6–2.6)
MCH RBC QN AUTO: 31.9 PG (ref 26–35)
MCHC RBC AUTO-ENTMCNC: 33 G/DL (ref 32–34.5)
MCV RBC AUTO: 96.6 FL (ref 80–99.9)
MONOCYTES ABSOLUTE: 0.49 K/UL (ref 0.1–0.95)
MONOCYTES RELATIVE PERCENT: 7 % (ref 2–12)
NEUTROPHILS ABSOLUTE: 4.08 K/UL (ref 1.8–7.3)
NEUTROPHILS RELATIVE PERCENT: 60 % (ref 43–80)
PDW BLD-RTO: 13.5 % (ref 11.5–15)
PHOSPHORUS: 3.1 MG/DL (ref 2.5–4.5)
PLATELET # BLD: 237 K/UL (ref 130–450)
PMV BLD AUTO: 10.5 FL (ref 7–12)
POTASSIUM SERPL-SCNC: 4.2 MMOL/L (ref 3.5–5)
RBC # BLD: 4.11 M/UL (ref 3.5–5.5)
SODIUM BLD-SCNC: 134 MMOL/L (ref 132–146)
TOTAL PROTEIN: 7.5 G/DL (ref 6.4–8.3)
TRIGL SERPL-MCNC: 168 MG/DL
WBC # BLD: 6.8 K/UL (ref 4.5–11.5)

## 2024-02-26 LAB
ABSOLUTE IMMATURE GRANULOCYTE: <0.03 K/UL (ref 0–0.58)
ALBUMIN SERPL-MCNC: 4.3 G/DL (ref 3.5–5.2)
ALP BLD-CCNC: 205 U/L (ref 35–104)
ALT SERPL-CCNC: 22 U/L (ref 0–32)
ANION GAP SERPL CALCULATED.3IONS-SCNC: 19 MMOL/L (ref 7–16)
AST SERPL-CCNC: 36 U/L (ref 0–31)
BASOPHILS ABSOLUTE: 0.03 K/UL (ref 0–0.2)
BASOPHILS RELATIVE PERCENT: 0 % (ref 0–2)
BILIRUB SERPL-MCNC: 0.4 MG/DL (ref 0–1.2)
BUN BLDV-MCNC: 30 MG/DL (ref 6–20)
CALCIUM SERPL-MCNC: 10.3 MG/DL (ref 8.6–10.2)
CHLORIDE BLD-SCNC: 95 MMOL/L (ref 98–107)
CO2: 22 MMOL/L (ref 22–29)
CREAT SERPL-MCNC: 0.8 MG/DL (ref 0.5–1)
EOSINOPHILS ABSOLUTE: 0.37 K/UL (ref 0.05–0.5)
EOSINOPHILS RELATIVE PERCENT: 5 % (ref 0–6)
GFR SERPL CREATININE-BSD FRML MDRD: >60 ML/MIN/1.73M2
GLUCOSE BLD-MCNC: 133 MG/DL (ref 74–99)
HCT VFR BLD CALC: 44.2 % (ref 34–48)
HEMOGLOBIN: 15.1 G/DL (ref 11.5–15.5)
IMMATURE GRANULOCYTES: 0 % (ref 0–5)
LYMPHOCYTES ABSOLUTE: 2.14 K/UL (ref 1.5–4)
LYMPHOCYTES RELATIVE PERCENT: 27 % (ref 20–42)
MAGNESIUM: 2 MG/DL (ref 1.6–2.6)
MCH RBC QN AUTO: 32.3 PG (ref 26–35)
MCHC RBC AUTO-ENTMCNC: 34.2 G/DL (ref 32–34.5)
MCV RBC AUTO: 94.4 FL (ref 80–99.9)
MONOCYTES ABSOLUTE: 0.54 K/UL (ref 0.1–0.95)
MONOCYTES RELATIVE PERCENT: 7 % (ref 2–12)
NEUTROPHILS ABSOLUTE: 4.72 K/UL (ref 1.8–7.3)
NEUTROPHILS RELATIVE PERCENT: 60 % (ref 43–80)
PDW BLD-RTO: 13.1 % (ref 11.5–15)
PHOSPHORUS: 3.7 MG/DL (ref 2.5–4.5)
PLATELET # BLD: 271 K/UL (ref 130–450)
PMV BLD AUTO: 10.2 FL (ref 7–12)
POTASSIUM SERPL-SCNC: 4.3 MMOL/L (ref 3.5–5)
RBC # BLD: 4.68 M/UL (ref 3.5–5.5)
SODIUM BLD-SCNC: 136 MMOL/L (ref 132–146)
TOTAL PROTEIN: 8.4 G/DL (ref 6.4–8.3)
TRIGL SERPL-MCNC: 143 MG/DL
WBC # BLD: 7.8 K/UL (ref 4.5–11.5)

## 2024-03-03 LAB
ACINETOBACTER CALCOACETICUS-BAUMANNII COMPLEX: NOT DETECTED
BACTEROIDES FRAGILIS: NOT DETECTED
BIOFIRE TEST COMMENT: NORMAL
CANDIDA ALBICANS: NOT DETECTED
CANDIDA AURIS: NOT DETECTED
CANDIDA GLABRATA: NOT DETECTED
CANDIDA KRUSEI: NOT DETECTED
CANDIDA PARAPSILOSIS: NOT DETECTED
CANDIDA TROPICALIS: NOT DETECTED
CRYPTOCOCCUS NEOFORMANS/GATTII: NOT DETECTED
CULTURE: NORMAL
CULTURE: NORMAL
DIRECT EXAM: NORMAL
ENTEROBACTER CLOACAE COMPLEX: NOT DETECTED
ENTEROBACTERALES: NOT DETECTED
ENTEROCOCCUS FAECALIS: NOT DETECTED
ENTEROCOCCUS FAECIUM: NOT DETECTED
ESCHERICHIA COLI: NOT DETECTED
HAEMOPHILUS INFLUENZAE: NOT DETECTED
KLEBSIELLA AEROGENES: NOT DETECTED
KLEBSIELLA OXYTOCA: NOT DETECTED
KLEBSIELLA PNEUMONIAE GROUP: NOT DETECTED
LISTERIA MONOCYTOGENES: NOT DETECTED
Lab: NORMAL
Lab: NORMAL
NEISSERIA MENINGITIDIS, NMNI: NOT DETECTED
PROTEUS SPP: NOT DETECTED
PSEUDOMONAS AERUGINOSA: NOT DETECTED
SALMONELLA SPECIES: NOT DETECTED
SERRATIA MARCESCENS: NOT DETECTED
SPECIMEN DESCRIPTION: NORMAL
SPECIMEN DESCRIPTION: NORMAL
STAPHYLOCOCCUS AUREUS: NOT DETECTED
STAPHYLOCOCCUS EPIDERMIDIS: NOT DETECTED
STAPHYLOCOCCUS LUGDUNENSIS: NOT DETECTED
STAPHYLOCOCCUS SPP: NOT DETECTED
STENOTROPHOMONAS MALTOPHILIA: NOT DETECTED
STREPTOCOCCUS AGALACTIAE (GROUP B): NOT DETECTED
STREPTOCOCCUS PNEUMONIAE: NOT DETECTED
STREPTOCOCCUS PYOGENES: NOT DETECTED
STREPTOCOCCUS SPP: NOT DETECTED

## 2024-03-04 LAB
ABSOLUTE IMMATURE GRANULOCYTE: <0.03 K/UL (ref 0–0.58)
ALBUMIN SERPL-MCNC: 3.9 G/DL (ref 3.5–5.2)
ALP BLD-CCNC: 138 U/L (ref 35–104)
ALT SERPL-CCNC: 17 U/L (ref 0–32)
ANION GAP SERPL CALCULATED.3IONS-SCNC: 11 MMOL/L (ref 7–16)
AST SERPL-CCNC: 27 U/L (ref 0–31)
BASOPHILS ABSOLUTE: 0.05 K/UL (ref 0–0.2)
BASOPHILS RELATIVE PERCENT: 1 % (ref 0–2)
BILIRUB SERPL-MCNC: 0.3 MG/DL (ref 0–1.2)
BUN BLDV-MCNC: 27 MG/DL (ref 6–20)
CALCIUM SERPL-MCNC: 9.8 MG/DL (ref 8.6–10.2)
CHLORIDE BLD-SCNC: 100 MMOL/L (ref 98–107)
CO2: 25 MMOL/L (ref 22–29)
CREAT SERPL-MCNC: 0.8 MG/DL (ref 0.5–1)
EOSINOPHILS ABSOLUTE: 0.31 K/UL (ref 0.05–0.5)
EOSINOPHILS RELATIVE PERCENT: 5 % (ref 0–6)
GFR SERPL CREATININE-BSD FRML MDRD: >60 ML/MIN/1.73M2
GLUCOSE BLD-MCNC: 120 MG/DL (ref 74–99)
HCT VFR BLD CALC: 38.3 % (ref 34–48)
HEMOGLOBIN: 12.7 G/DL (ref 11.5–15.5)
IMMATURE GRANULOCYTES: 0 % (ref 0–5)
LYMPHOCYTES ABSOLUTE: 2.08 K/UL (ref 1.5–4)
LYMPHOCYTES RELATIVE PERCENT: 30 % (ref 20–42)
MAGNESIUM: 2.4 MG/DL (ref 1.6–2.6)
MCH RBC QN AUTO: 31.5 PG (ref 26–35)
MCHC RBC AUTO-ENTMCNC: 33.2 G/DL (ref 32–34.5)
MCV RBC AUTO: 95 FL (ref 80–99.9)
MONOCYTES ABSOLUTE: 0.51 K/UL (ref 0.1–0.95)
MONOCYTES RELATIVE PERCENT: 7 % (ref 2–12)
NEUTROPHILS ABSOLUTE: 3.91 K/UL (ref 1.8–7.3)
NEUTROPHILS RELATIVE PERCENT: 57 % (ref 43–80)
PDW BLD-RTO: 12.9 % (ref 11.5–15)
PHOSPHORUS: 3.5 MG/DL (ref 2.5–4.5)
PLATELET # BLD: 220 K/UL (ref 130–450)
PMV BLD AUTO: 9.7 FL (ref 7–12)
POTASSIUM SERPL-SCNC: 4.7 MMOL/L (ref 3.5–5)
RBC # BLD: 4.03 M/UL (ref 3.5–5.5)
SODIUM BLD-SCNC: 136 MMOL/L (ref 132–146)
TOTAL PROTEIN: 7.4 G/DL (ref 6.4–8.3)
TRIGL SERPL-MCNC: 160 MG/DL
WBC # BLD: 6.9 K/UL (ref 4.5–11.5)

## 2024-03-05 LAB
ACINETOBACTER CALCOACETICUS-BAUMANNII COMPLEX: NOT DETECTED
BACTEROIDES FRAGILIS: NOT DETECTED
BIOFIRE TEST COMMENT: ABNORMAL
CANDIDA ALBICANS: NOT DETECTED
CANDIDA AURIS: NOT DETECTED
CANDIDA GLABRATA: NOT DETECTED
CANDIDA KRUSEI: NOT DETECTED
CANDIDA PARAPSILOSIS: NOT DETECTED
CANDIDA TROPICALIS: NOT DETECTED
CRYPTOCOCCUS NEOFORMANS/GATTII: NOT DETECTED
CULTURE: ABNORMAL
DIRECT EXAM: ABNORMAL
ENTEROBACTER CLOACAE COMPLEX: NOT DETECTED
ENTEROBACTERALES: NOT DETECTED
ENTEROCOCCUS FAECALIS: NOT DETECTED
ENTEROCOCCUS FAECIUM: NOT DETECTED
ESCHERICHIA COLI: NOT DETECTED
HAEMOPHILUS INFLUENZAE: NOT DETECTED
KLEBSIELLA AEROGENES: NOT DETECTED
KLEBSIELLA OXYTOCA: NOT DETECTED
KLEBSIELLA PNEUMONIAE GROUP: NOT DETECTED
LISTERIA MONOCYTOGENES: NOT DETECTED
Lab: ABNORMAL
NEISSERIA MENINGITIDIS, NMNI: NOT DETECTED
PROTEUS SPP: NOT DETECTED
PSEUDOMONAS AERUGINOSA: NOT DETECTED
SALMONELLA SPECIES: NOT DETECTED
SERRATIA MARCESCENS: NOT DETECTED
SPECIMEN DESCRIPTION: ABNORMAL
STAPHYLOCOCCUS AUREUS: NOT DETECTED
STAPHYLOCOCCUS EPIDERMIDIS: NOT DETECTED
STAPHYLOCOCCUS LUGDUNENSIS: NOT DETECTED
STAPHYLOCOCCUS SPP: NOT DETECTED
STENOTROPHOMONAS MALTOPHILIA: NOT DETECTED
STREPTOCOCCUS AGALACTIAE (GROUP B): NOT DETECTED
STREPTOCOCCUS PNEUMONIAE: NOT DETECTED
STREPTOCOCCUS PYOGENES: NOT DETECTED
STREPTOCOCCUS SPP: NOT DETECTED

## 2024-03-07 ENCOUNTER — HOSPITAL ENCOUNTER (INPATIENT)
Age: 59
LOS: 4 days | Discharge: HOME | DRG: 315 | End: 2024-03-12
Attending: STUDENT IN AN ORGANIZED HEALTH CARE EDUCATION/TRAINING PROGRAM | Admitting: STUDENT IN AN ORGANIZED HEALTH CARE EDUCATION/TRAINING PROGRAM
Payer: MEDICARE

## 2024-03-07 ENCOUNTER — APPOINTMENT (OUTPATIENT)
Dept: GENERAL RADIOLOGY | Age: 59
DRG: 315 | End: 2024-03-07
Payer: MEDICARE

## 2024-03-07 DIAGNOSIS — R78.81 POSITIVE BLOOD CULTURE: Primary | ICD-10-CM

## 2024-03-07 LAB
CULTURE: NORMAL
Lab: NORMAL
SPECIMEN DESCRIPTION: NORMAL

## 2024-03-07 PROCEDURE — 99285 EMERGENCY DEPT VISIT HI MDM: CPT

## 2024-03-07 PROCEDURE — 71045 X-RAY EXAM CHEST 1 VIEW: CPT

## 2024-03-07 PROCEDURE — 96365 THER/PROPH/DIAG IV INF INIT: CPT

## 2024-03-07 PROCEDURE — 85025 COMPLETE CBC W/AUTO DIFF WBC: CPT

## 2024-03-07 PROCEDURE — 96366 THER/PROPH/DIAG IV INF ADDON: CPT

## 2024-03-07 PROCEDURE — 83605 ASSAY OF LACTIC ACID: CPT

## 2024-03-08 ENCOUNTER — APPOINTMENT (OUTPATIENT)
Dept: CT IMAGING | Age: 59
DRG: 315 | End: 2024-03-08
Payer: MEDICARE

## 2024-03-08 PROBLEM — E11.9 TYPE 2 DIABETES MELLITUS, WITHOUT LONG-TERM CURRENT USE OF INSULIN (HCC): Status: ACTIVE | Noted: 2024-03-08

## 2024-03-08 PROBLEM — N17.9 AKI (ACUTE KIDNEY INJURY) (HCC): Status: ACTIVE | Noted: 2024-03-08

## 2024-03-08 PROBLEM — E44.0 MODERATE PROTEIN-CALORIE MALNUTRITION (HCC): Chronic | Status: ACTIVE | Noted: 2024-03-08

## 2024-03-08 LAB
ALBUMIN SERPL-MCNC: 3.9 G/DL (ref 3.5–5.2)
ALBUMIN SERPL-MCNC: 4.4 G/DL (ref 3.5–5.2)
ALP SERPL-CCNC: 155 U/L (ref 35–104)
ALP SERPL-CCNC: 173 U/L (ref 35–104)
ALT SERPL-CCNC: 22 U/L (ref 0–32)
ALT SERPL-CCNC: 25 U/L (ref 0–32)
ANION GAP SERPL CALCULATED.3IONS-SCNC: 13 MMOL/L (ref 7–16)
ANION GAP SERPL CALCULATED.3IONS-SCNC: 13 MMOL/L (ref 7–16)
AST SERPL-CCNC: 35 U/L (ref 0–31)
AST SERPL-CCNC: 38 U/L (ref 0–31)
BASOPHILS # BLD: 0.04 K/UL (ref 0–0.2)
BASOPHILS NFR BLD: 0 % (ref 0–2)
BILIRUB SERPL-MCNC: 0.4 MG/DL (ref 0–1.2)
BILIRUB SERPL-MCNC: 0.5 MG/DL (ref 0–1.2)
BILIRUB UR QL STRIP: NEGATIVE
BUN SERPL-MCNC: 35 MG/DL (ref 6–20)
BUN SERPL-MCNC: 39 MG/DL (ref 6–20)
CALCIUM SERPL-MCNC: 10.4 MG/DL (ref 8.6–10.2)
CALCIUM SERPL-MCNC: 9.7 MG/DL (ref 8.6–10.2)
CHLORIDE SERPL-SCNC: 92 MMOL/L (ref 98–107)
CHLORIDE SERPL-SCNC: 96 MMOL/L (ref 98–107)
CLARITY UR: CLEAR
CO2 SERPL-SCNC: 25 MMOL/L (ref 22–29)
CO2 SERPL-SCNC: 27 MMOL/L (ref 22–29)
COLOR UR: YELLOW
CREAT SERPL-MCNC: 1.1 MG/DL (ref 0.5–1)
CREAT SERPL-MCNC: 1.3 MG/DL (ref 0.5–1)
CREAT UR-MCNC: 246.9 MG/DL (ref 29–226)
CREAT UR-MCNC: 247.7 MG/DL (ref 29–226)
CREAT UR-MCNC: 249.4 MG/DL (ref 29–226)
CRP SERPL HS-MCNC: 4 MG/L (ref 0–5)
EOSINOPHIL # BLD: 0.38 K/UL (ref 0.05–0.5)
EOSINOPHILS RELATIVE PERCENT: 4 % (ref 0–6)
ERYTHROCYTE [DISTWIDTH] IN BLOOD BY AUTOMATED COUNT: 13 % (ref 11.5–15)
ERYTHROCYTE [SEDIMENTATION RATE] IN BLOOD BY WESTERGREN METHOD: 37 MM/HR (ref 0–20)
GFR SERPL CREATININE-BSD FRML MDRD: 48 ML/MIN/1.73M2
GFR SERPL CREATININE-BSD FRML MDRD: 56 ML/MIN/1.73M2
GLUCOSE BLD-MCNC: 102 MG/DL (ref 74–99)
GLUCOSE BLD-MCNC: 105 MG/DL (ref 74–99)
GLUCOSE BLD-MCNC: 112 MG/DL (ref 74–99)
GLUCOSE BLD-MCNC: 114 MG/DL (ref 74–99)
GLUCOSE SERPL-MCNC: 101 MG/DL (ref 74–99)
GLUCOSE SERPL-MCNC: 93 MG/DL (ref 74–99)
GLUCOSE UR STRIP-MCNC: NEGATIVE MG/DL
HCT VFR BLD AUTO: 42.4 % (ref 34–48)
HGB BLD-MCNC: 14.4 G/DL (ref 11.5–15.5)
HGB UR QL STRIP.AUTO: NEGATIVE
IMM GRANULOCYTES # BLD AUTO: <0.03 K/UL (ref 0–0.58)
IMM GRANULOCYTES NFR BLD: 0 % (ref 0–5)
INR PPP: 1.1
KETONES UR STRIP-MCNC: NEGATIVE MG/DL
LACTATE BLDV-SCNC: 2.2 MMOL/L (ref 0.5–1.9)
LEUKOCYTE ESTERASE UR QL STRIP: NEGATIVE
LYMPHOCYTES NFR BLD: 3.72 K/UL (ref 1.5–4)
LYMPHOCYTES RELATIVE PERCENT: 41 % (ref 20–42)
MAGNESIUM SERPL-MCNC: 2 MG/DL (ref 1.6–2.6)
MCH RBC QN AUTO: 31.9 PG (ref 26–35)
MCHC RBC AUTO-ENTMCNC: 34 G/DL (ref 32–34.5)
MCV RBC AUTO: 94 FL (ref 80–99.9)
MICROALBUMIN UR-MCNC: 14 MG/L (ref 0–19)
MICROALBUMIN/CREAT UR-RTO: 6 MCG/MG CREAT (ref 0–30)
MONOCYTES NFR BLD: 0.79 K/UL (ref 0.1–0.95)
MONOCYTES NFR BLD: 9 % (ref 2–12)
NEUTROPHILS NFR BLD: 45 % (ref 43–80)
NEUTS SEG NFR BLD: 4.07 K/UL (ref 1.8–7.3)
NITRITE UR QL STRIP: NEGATIVE
PH UR STRIP: 5.5 [PH] (ref 5–9)
PLATELET # BLD AUTO: 266 K/UL (ref 130–450)
PMV BLD AUTO: 9.2 FL (ref 7–12)
POTASSIUM SERPL-SCNC: 3.9 MMOL/L (ref 3.5–5)
POTASSIUM SERPL-SCNC: 4.1 MMOL/L (ref 3.5–5)
PROT SERPL-MCNC: 7.6 G/DL (ref 6.4–8.3)
PROT SERPL-MCNC: 8.4 G/DL (ref 6.4–8.3)
PROT UR STRIP-MCNC: NEGATIVE MG/DL
PROTHROMBIN TIME: 12.6 SEC (ref 9.3–12.4)
RBC # BLD AUTO: 4.51 M/UL (ref 3.5–5.5)
RBC #/AREA URNS HPF: NORMAL /HPF
SODIUM SERPL-SCNC: 132 MMOL/L (ref 132–146)
SODIUM SERPL-SCNC: 134 MMOL/L (ref 132–146)
SODIUM UR-SCNC: <20 MMOL/L
SP GR UR STRIP: 1.02 (ref 1–1.03)
TOTAL PROTEIN, URINE: 15 MG/DL (ref 0–12)
URINE TOTAL PROTEIN CREATININE RATIO: 0.06 (ref 0–0.2)
UROBILINOGEN UR STRIP-ACNC: 0.2 EU/DL (ref 0–1)
UUN UR-MCNC: 1573 MG/DL (ref 800–1666)
WBC #/AREA URNS HPF: NORMAL /HPF
WBC OTHER # BLD: 9 K/UL (ref 4.5–11.5)

## 2024-03-08 PROCEDURE — 82962 GLUCOSE BLOOD TEST: CPT

## 2024-03-08 PROCEDURE — 87154 CUL TYP ID BLD PTHGN 6+ TRGT: CPT

## 2024-03-08 PROCEDURE — 2580000003 HC RX 258

## 2024-03-08 PROCEDURE — 85652 RBC SED RATE AUTOMATED: CPT

## 2024-03-08 PROCEDURE — 6370000000 HC RX 637 (ALT 250 FOR IP)

## 2024-03-08 PROCEDURE — 6360000004 HC RX CONTRAST MEDICATION: Performed by: RADIOLOGY

## 2024-03-08 PROCEDURE — 82043 UR ALBUMIN QUANTITATIVE: CPT

## 2024-03-08 PROCEDURE — 81001 URINALYSIS AUTO W/SCOPE: CPT

## 2024-03-08 PROCEDURE — 6360000002 HC RX W HCPCS

## 2024-03-08 PROCEDURE — 74177 CT ABD & PELVIS W/CONTRAST: CPT

## 2024-03-08 PROCEDURE — 83735 ASSAY OF MAGNESIUM: CPT

## 2024-03-08 PROCEDURE — 82570 ASSAY OF URINE CREATININE: CPT

## 2024-03-08 PROCEDURE — 86140 C-REACTIVE PROTEIN: CPT

## 2024-03-08 PROCEDURE — 1200000000 HC SEMI PRIVATE

## 2024-03-08 PROCEDURE — 87071 CULTURE AEROBIC QUANT OTHER: CPT

## 2024-03-08 PROCEDURE — 99222 1ST HOSP IP/OBS MODERATE 55: CPT | Performed by: STUDENT IN AN ORGANIZED HEALTH CARE EDUCATION/TRAINING PROGRAM

## 2024-03-08 PROCEDURE — 85610 PROTHROMBIN TIME: CPT

## 2024-03-08 PROCEDURE — 84540 ASSAY OF URINE/UREA-N: CPT

## 2024-03-08 PROCEDURE — 87077 CULTURE AEROBIC IDENTIFY: CPT

## 2024-03-08 PROCEDURE — 84156 ASSAY OF PROTEIN URINE: CPT

## 2024-03-08 PROCEDURE — 87040 BLOOD CULTURE FOR BACTERIA: CPT

## 2024-03-08 PROCEDURE — 84300 ASSAY OF URINE SODIUM: CPT

## 2024-03-08 PROCEDURE — 36415 COLL VENOUS BLD VENIPUNCTURE: CPT

## 2024-03-08 PROCEDURE — APPSS45 APP SPLIT SHARED TIME 31-45 MINUTES

## 2024-03-08 PROCEDURE — 80053 COMPREHEN METABOLIC PANEL: CPT

## 2024-03-08 RX ORDER — BUSPIRONE HYDROCHLORIDE 10 MG/1
10 TABLET ORAL 2 TIMES DAILY
Status: DISCONTINUED | OUTPATIENT
Start: 2024-03-08 | End: 2024-03-08

## 2024-03-08 RX ORDER — POLYETHYLENE GLYCOL 3350 17 G/17G
17 POWDER, FOR SOLUTION ORAL DAILY PRN
Status: DISCONTINUED | OUTPATIENT
Start: 2024-03-08 | End: 2024-03-12 | Stop reason: HOSPADM

## 2024-03-08 RX ORDER — DEXTROSE AND SODIUM CHLORIDE 5; .9 G/100ML; G/100ML
INJECTION, SOLUTION INTRAVENOUS CONTINUOUS
Status: DISCONTINUED | OUTPATIENT
Start: 2024-03-08 | End: 2024-03-09

## 2024-03-08 RX ORDER — ACETAMINOPHEN 650 MG/1
650 SUPPOSITORY RECTAL EVERY 6 HOURS PRN
Status: DISCONTINUED | OUTPATIENT
Start: 2024-03-08 | End: 2024-03-12 | Stop reason: HOSPADM

## 2024-03-08 RX ORDER — SODIUM CHLORIDE 9 MG/ML
INJECTION, SOLUTION INTRAVENOUS PRN
Status: DISCONTINUED | OUTPATIENT
Start: 2024-03-08 | End: 2024-03-11 | Stop reason: SDUPTHER

## 2024-03-08 RX ORDER — ACETAMINOPHEN 325 MG/1
650 TABLET ORAL EVERY 6 HOURS PRN
Status: DISCONTINUED | OUTPATIENT
Start: 2024-03-08 | End: 2024-03-12 | Stop reason: HOSPADM

## 2024-03-08 RX ORDER — LEVOTHYROXINE SODIUM 0.07 MG/1
75 TABLET ORAL DAILY
Status: DISCONTINUED | OUTPATIENT
Start: 2024-03-08 | End: 2024-03-12 | Stop reason: HOSPADM

## 2024-03-08 RX ORDER — SODIUM CHLORIDE 0.9 % (FLUSH) 0.9 %
5-40 SYRINGE (ML) INJECTION PRN
Status: DISCONTINUED | OUTPATIENT
Start: 2024-03-08 | End: 2024-03-11 | Stop reason: SDUPTHER

## 2024-03-08 RX ORDER — TRAZODONE HYDROCHLORIDE 100 MG/1
100 TABLET ORAL NIGHTLY
COMMUNITY

## 2024-03-08 RX ORDER — LOPERAMIDE HYDROCHLORIDE 2 MG/1
2 CAPSULE ORAL 4 TIMES DAILY
Status: DISCONTINUED | OUTPATIENT
Start: 2024-03-08 | End: 2024-03-12 | Stop reason: HOSPADM

## 2024-03-08 RX ORDER — TRAZODONE HYDROCHLORIDE 50 MG/1
100 TABLET ORAL NIGHTLY
Status: DISCONTINUED | OUTPATIENT
Start: 2024-03-08 | End: 2024-03-12 | Stop reason: HOSPADM

## 2024-03-08 RX ORDER — SERTRALINE HYDROCHLORIDE 100 MG/1
50 TABLET, FILM COATED ORAL DAILY
Status: DISCONTINUED | OUTPATIENT
Start: 2024-03-09 | End: 2024-03-12 | Stop reason: HOSPADM

## 2024-03-08 RX ORDER — 0.9 % SODIUM CHLORIDE 0.9 %
1000 INTRAVENOUS SOLUTION INTRAVENOUS ONCE
Status: COMPLETED | OUTPATIENT
Start: 2024-03-08 | End: 2024-03-08

## 2024-03-08 RX ORDER — PANTOPRAZOLE SODIUM 40 MG/1
40 TABLET, DELAYED RELEASE ORAL
Status: DISCONTINUED | OUTPATIENT
Start: 2024-03-08 | End: 2024-03-12 | Stop reason: HOSPADM

## 2024-03-08 RX ORDER — SODIUM CHLORIDE 0.9 % (FLUSH) 0.9 %
5-40 SYRINGE (ML) INJECTION EVERY 12 HOURS SCHEDULED
Status: DISCONTINUED | OUTPATIENT
Start: 2024-03-08 | End: 2024-03-11 | Stop reason: SDUPTHER

## 2024-03-08 RX ADMIN — PANTOPRAZOLE SODIUM 40 MG: 40 TABLET, DELAYED RELEASE ORAL at 06:43

## 2024-03-08 RX ADMIN — CEFEPIME 2000 MG: 2 INJECTION, POWDER, FOR SOLUTION INTRAVENOUS at 11:07

## 2024-03-08 RX ADMIN — APIXABAN 5 MG: 5 TABLET, FILM COATED ORAL at 21:55

## 2024-03-08 RX ADMIN — LEVOTHYROXINE SODIUM 75 MCG: 75 TABLET ORAL at 06:43

## 2024-03-08 RX ADMIN — CEFEPIME 2000 MG: 2 INJECTION, POWDER, FOR SOLUTION INTRAVENOUS at 01:30

## 2024-03-08 RX ADMIN — IOPAMIDOL 75 ML: 755 INJECTION, SOLUTION INTRAVENOUS at 01:06

## 2024-03-08 RX ADMIN — SODIUM CHLORIDE 1000 ML: 9 INJECTION, SOLUTION INTRAVENOUS at 01:24

## 2024-03-08 RX ADMIN — DEXTROSE AND SODIUM CHLORIDE: 5; 900 INJECTION, SOLUTION INTRAVENOUS at 16:06

## 2024-03-08 RX ADMIN — APIXABAN 5 MG: 5 TABLET, FILM COATED ORAL at 08:56

## 2024-03-08 RX ADMIN — LOPERAMIDE HYDROCHLORIDE 2 MG: 2 CAPSULE ORAL at 21:55

## 2024-03-08 ASSESSMENT — PAIN - FUNCTIONAL ASSESSMENT: PAIN_FUNCTIONAL_ASSESSMENT: NONE - DENIES PAIN

## 2024-03-08 NOTE — CARE COORDINATION
3/8/2024.Social Work Discharge Planning:IVATB. Bacteremia.  This worker met with Pt to discuss  role and transition of care/discharge planning.Pt is independent from home with her sig other. No DME. Room air. Pharmacy is [facility] in [place] and PCP is Dr. VIVIAN Castro. Electronically signed by MENG Bernal on 3/8/2024 at 2:46 PM

## 2024-03-08 NOTE — ED NOTES
ED to Inpatient Handoff Report    Notified floor that electronic handoff available and patient ready for transport to room 537.    Safety Risks: None identified    Patient in Restraints: no    Constant Observer or Patient : no    Telemetry Monitoring Ordered :No           Order to transfer to unit without monitor:N/A    Last MEWS:   Time completed: 0413    Deterioration Index Score:   Predictive Model Details          23 (Normal)  Factor Value    Calculated 3/8/2024 04:17 45% Age 59 years old    Deterioration Index Model 25% Systolic 95     11% Respiratory rate 18     5% Sodium 132 mmol/L     4% BUN abnormal (39 mg/dL)     4% Pulse 92     3% Potassium 4.1 mmol/L     2% WBC count 9.0 k/uL     1% Pulse oximetry 98 %     1% Hematocrit 42.4 %     0% Temperature 98 °F (36.7 °C)        Vitals:    03/07/24 2236 03/07/24 2237 03/08/24 0413   BP:  (!) 123/94 95/62   Pulse: 88  92   Resp: 18  18   Temp: 97.7 °F (36.5 °C)  98 °F (36.7 °C)   TempSrc:   Oral   SpO2: 100%  98%   Weight: 61.2 kg (135 lb)     Height: 1.549 m (5' 1\")           Opportunity for questions and clarification was provided.

## 2024-03-08 NOTE — ED PROVIDER NOTES
Compartments are soft and compressible.   Capillary refill <3 seconds.  Skin: warm and dry. No rashes.   Neurologic: GCS 15.   Facial symmetry.   Speech clear.    No meningeal signs.  Psych: Normal Affect    -------------------------------------------------- RESULTS -------------------------------------------------  I have personally reviewed all laboratory and imaging results for this patient. Results are listed below.     LABS:  Results for orders placed or performed during the hospital encounter of 03/07/24   CBC with Auto Differential   Result Value Ref Range    WBC 9.0 4.5 - 11.5 k/uL    RBC 4.51 3.50 - 5.50 m/uL    Hemoglobin 14.4 11.5 - 15.5 g/dL    Hematocrit 42.4 34.0 - 48.0 %    MCV 94.0 80.0 - 99.9 fL    MCH 31.9 26.0 - 35.0 pg    MCHC 34.0 32.0 - 34.5 g/dL    RDW 13.0 11.5 - 15.0 %    Platelets 266 130 - 450 k/uL    MPV 9.2 7.0 - 12.0 fL    Neutrophils % 45 43.0 - 80.0 %    Lymphocytes % 41 20.0 - 42.0 %    Monocytes % 9 2.0 - 12.0 %    Eosinophils % 4 0 - 6 %    Basophils % 0 0.0 - 2.0 %    Immature Granulocytes 0 0.0 - 5.0 %    Neutrophils Absolute 4.07 1.80 - 7.30 k/uL    Lymphocytes Absolute 3.72 1.50 - 4.00 k/uL    Monocytes Absolute 0.79 0.10 - 0.95 k/uL    Eosinophils Absolute 0.38 0.05 - 0.50 k/uL    Basophils Absolute 0.04 0.00 - 0.20 k/uL    Absolute Immature Granulocyte <0.03 0.00 - 0.58 k/uL   CMP   Result Value Ref Range    Sodium 132 132 - 146 mmol/L    Potassium 4.1 3.5 - 5.0 mmol/L    Chloride 92 (L) 98 - 107 mmol/L    CO2 27 22 - 29 mmol/L    Anion Gap 13 7 - 16 mmol/L    Glucose 101 (H) 74 - 99 mg/dL    BUN 39 (H) 6 - 20 mg/dL    Creatinine 1.3 (H) 0.50 - 1.00 mg/dL    Est, Glom Filt Rate 48 (L) >60 mL/min/1.73m2    Calcium 10.4 (H) 8.6 - 10.2 mg/dL    Total Protein 8.4 (H) 6.4 - 8.3 g/dL    Albumin 4.4 3.5 - 5.2 g/dL    Total Bilirubin 0.4 0.0 - 1.2 mg/dL    Alkaline Phosphatase 173 (H) 35 - 104 U/L    ALT 25 0 - 32 U/L    AST 38 (H) 0 - 31 U/L   Lactate, Sepsis   Result Value Ref

## 2024-03-08 NOTE — PLAN OF CARE
Problem: Safety - Adult  Goal: Free from fall injury  3/8/2024 1002 by Keesha Johnson, RN  Outcome: Progressing  3/8/2024 0529 by Lizzette Ferreira, RN  Outcome: Progressing     Problem: Chronic Conditions and Co-morbidities  Goal: Patient's chronic conditions and co-morbidity symptoms are monitored and maintained or improved  Outcome: Progressing

## 2024-03-08 NOTE — H&P
PLACEMENT      also removed       Medications Prior to Admission:    Prior to Admission medications    Medication Sig Start Date End Date Taking? Authorizing Provider   albuterol sulfate HFA (VENTOLIN HFA) 108 (90 Base) MCG/ACT inhaler Inhale 2 puffs into the lungs every 4 hours as needed for Wheezing or Shortness of Breath    Katelyn Sandoval MD   AMINO ACID INFUSION IV Infuse 162 mL/hr intravenously daily 7649-3343    Katelyn Sandoval MD   AMINO ACID INFUSION IV Infuse 90 mL/hr intravenously every morning 9365-3219    Katelyn Sandoval MD   AMINO ACID INFUSION IV Infuse 90 mL/hr intravenously every evening 0851-5099    Katelyn Sandoval MD   Glucagon (BAQSIMI ONE PACK) 3 MG/DOSE POWD 3 mg by Nasal route as needed (BS <70, MAY REPEAT IN 15MINS.)    Katelyn Sandoval MD   busPIRone (BUSPAR) 10 MG tablet Take 1 tablet by mouth 2 times daily    Katelyn Sandoval MD   dextrose 50 % solution Infuse 50 mLs intravenously as needed (IF TPN NOT AVAILABLE)    Katelyn Sandoval MD   levothyroxine (SYNTHROID) 75 MCG tablet Take 1 tablet by mouth Daily    Katelyn Sandoval MD   loperamide (IMODIUM) 2 MG capsule Take 1 capsule by mouth 4 times daily Indications: MAX: 16MG/DAY    Katelyn Sandoval MD   mirtazapine (REMERON) 15 MG tablet Take 1 tablet by mouth nightly    Katelyn Sandoval MD   insulin aspart (NOVOLOG FLEXPEN) 100 UNIT/ML injection pen Inject 0-8 Units into the skin 2 times daily PER SLIDING SCALE: 0-199=0U, 200-249=2U, 250-299=4U, 300-349=6U, 350-399=8U, >400=SASKIA MD    ProviderKatelyn MD   omeprazole (PRILOSEC) 20 MG delayed release capsule Take 1 capsule by mouth 2 times daily    Katelyn Sandoval MD   ondansetron (ZOFRAN) 4 MG tablet Take 1 tablet by mouth every 6 hours as needed for Nausea or Vomiting    Katelyn Sandoval MD   silver sulfADIAZINE (SILVADENE) 1 % cream Apply topically See Admin Instructions QD & PRN SURROUNDING ABD WOUND    Provider

## 2024-03-08 NOTE — CONSULTS
Comprehensive Nutrition Assessment    Type and Reason for Visit:  Initial, Positive Nutrition Screen, Consult (PN Recommendation)    Nutrition Recommendations/Plan:   When new central access is obtained, recommend the following PN:    TPN RECOMMENDATION: Custom 2-in-1 Central PN: 280 g Dextrose, 70 g AA in 1700 ml/d at 70.8 ml/hr with 250 ml Lipids 20% x 3/week  This will provide: 1446 kana (ave), 70 g AA  This regimen will meet 100% energy and protein needs    Continue inpatient monitoring     Malnutrition Assessment:  Malnutrition Status:  Moderate malnutrition (03/08/24 0835)    Context:  Chronic Illness     Findings of the 6 clinical characteristics of malnutrition:  Energy Intake:  Mild decrease in energy intake (Comment) (Curently NPO w/o PN and delay while new access obtained)  Weight Loss:  Greater than 20% over 1 year     Body Fat Loss:  No significant body fat loss     Muscle Mass Loss:  Mild muscle mass loss Clavicles (pectoralis & deltoids)  Fluid Accumulation:  No significant fluid accumulation     Strength:  Not Performed    Nutrition Assessment:    Pt admit 2/2 bacteremia with +BC. Pt has PICC for TPN d/t EC fissures, Crohn's, ileostomy. Hx colon CA and DM also. Planned removal of PICC noted. Will recommend TPN for use when new central access is established.    Nutrition Related Findings:    A&Ox3,  (3/4), BUN/creat 39/1.3, ECx2 abd, elevated LA, soft tender abd, I/O not avail, no edema, ileostomy (output not yet doc) Wound Type: Wound Consult Pending (consult re: EC fistula)       Current Nutrition Intake & Therapies:    Average Meal Intake: NPO  Average Supplements Intake: NPO  Diet NPO Exceptions are: Sips of Water with Meds, Ice Chips    Anthropometric Measures:  Height: 154.9 cm (5' 1\")  Ideal Body Weight (IBW): 105 lbs (48 kg)    Admission Body Weight: 60.7 kg (133 lb 14.4 oz) (3/8 bedscale)  Current Body Weight: 60.7 kg (133 lb 14.4 oz), 127.5 % IBW. Weight Source: Bed Scale 
   also removed     Current Medications:   Scheduled Meds:   sodium chloride flush  5-40 mL IntraVENous 2 times per day    apixaban  5 mg Oral BID    busPIRone  10 mg Oral BID    levothyroxine  75 mcg Oral Daily    pantoprazole  40 mg Oral QAM AC    cefepime  2,000 mg IntraVENous Q12H     Continuous Infusions:   dextrose 5 % and 0.9 % NaCl      sodium chloride       PRN Meds:sodium chloride flush, sodium chloride, polyethylene glycol, acetaminophen **OR** acetaminophen    Allergies:  Dilaudid [hydromorphone hcl], Cocoa, Lactose, Lyrica [pregabalin], Phenergan [promethazine hcl], and Phenytoin sodium extended    Social History:   Social History     Socioeconomic History    Marital status:    Tobacco Use    Smoking status: Some Days   Substance and Sexual Activity    Alcohol use: No    Drug use: No     Social Determinants of Health     Food Insecurity: No Food Insecurity (3/8/2024)    Hunger Vital Sign     Worried About Running Out of Food in the Last Year: Never true     Ran Out of Food in the Last Year: Never true   Transportation Needs: No Transportation Needs (3/8/2024)    PRAPARE - Transportation     Lack of Transportation (Medical): No     Lack of Transportation (Non-Medical): No   Housing Stability: Low Risk  (3/8/2024)    Housing Stability Vital Sign     Unable to Pay for Housing in the Last Year: No     Number of Places Lived in the Last Year: 1     Unstable Housing in the Last Year: No      Pets: dog  Travel: none  She lives at home with her boyfriend     Family History:       Problem Relation Age of Onset    Crohn's Disease Sister    . Otherwise non-pertinent to the chief complaint.    REVIEW OF SYSTEMS:    Constitutional: negative for fevers, chills, diaphoresis  Neurologic: Negative   Psychiatric: Negative  Rheumatologic: Negative   Endocrine: Negative  Hematologic: Negative  Immunologic: Negative  ENT: Negative  Respiratory: Negative   Cardiovascular: Negative  GI: as in the HPI  : 
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  Uncomplicated enteric anastomoses.  Left upper  quadrant ileostomy.  No ileus or obstruction.  No inflammatory bowel process.  Mild descending and sigmoid colonic diverticulosis.  Partial sigmoidectomy.  Appendix not visible.  No pericecal inflammatory change to suggest occult  appendicitis. Desiccated stool distension of the cecum and proximal colon  indicating constipation/fecal stasis.     Pelvis: No adnexal mass or pelvic fluid.     Peritoneum/Retroperitoneum: Normal caliber and contour of the aorta.  No  hernia, adenopathy or ascites.     Bones/Soft Tissues: Negative.     IMPRESSION:  1. No acute intra-abdominal or pelvic process.  2. Uncomplicated enteric anastomoses and left upper quadrant ileostomy.  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  3. Desiccated stool distension of the cecum and proximal colon indicating  constipation/fecal stasis.              Specimen Collected: 03/08/24 01:16 EST Last Resulted: 03/08/24 01:23 EST             Narrative & Impression  EXAMINATION:  ONE XRAY VIEW OF THE CHEST     3/7/2024 10:47 pm     COMPARISON:  Portable chest from 12/29/2023.     HISTORY:  ORDERING SYSTEM PROVIDED HISTORY: PICC line  TECHNOLOGIST PROVIDED HISTORY:  Reason for exam:->PICC line     FINDINGS:  The new left PICC line has its tip at the superior right atrium.  Withdrawing  the catheter 4 cm would place the tip at the cavoatrial junction.     The previously seen right PICC line and left internal jugular central line  have been removed..     The lungs remain clear. There is no sign of any infiltrate or effusion.     The heart remains normal in size.  The mediastinum is normal in appearance.     The osseous structures are normal in appearance for

## 2024-03-09 PROBLEM — E11.9 CONTROLLED TYPE 2 DIABETES MELLITUS WITHOUT COMPLICATION (HCC): Status: ACTIVE | Noted: 2024-03-09

## 2024-03-09 PROBLEM — E03.9 HYPOTHYROIDISM: Status: ACTIVE | Noted: 2024-03-09

## 2024-03-09 LAB
ANION GAP SERPL CALCULATED.3IONS-SCNC: 11 MMOL/L (ref 7–16)
ANION GAP SERPL CALCULATED.3IONS-SCNC: 9 MMOL/L (ref 7–16)
BASOPHILS # BLD: 0.02 K/UL (ref 0–0.2)
BASOPHILS NFR BLD: 0 % (ref 0–2)
BUN SERPL-MCNC: 22 MG/DL (ref 6–20)
BUN SERPL-MCNC: 29 MG/DL (ref 6–20)
CALCIUM SERPL-MCNC: 9.2 MG/DL (ref 8.6–10.2)
CALCIUM SERPL-MCNC: 9.3 MG/DL (ref 8.6–10.2)
CHLORIDE SERPL-SCNC: 102 MMOL/L (ref 98–107)
CHLORIDE SERPL-SCNC: 97 MMOL/L (ref 98–107)
CO2 SERPL-SCNC: 24 MMOL/L (ref 22–29)
CO2 SERPL-SCNC: 24 MMOL/L (ref 22–29)
CREAT SERPL-MCNC: 1 MG/DL (ref 0.5–1)
CREAT SERPL-MCNC: 1.1 MG/DL (ref 0.5–1)
EOSINOPHIL # BLD: 0.32 K/UL (ref 0.05–0.5)
EOSINOPHILS RELATIVE PERCENT: 6 % (ref 0–6)
ERYTHROCYTE [DISTWIDTH] IN BLOOD BY AUTOMATED COUNT: 12.9 % (ref 11.5–15)
GFR SERPL CREATININE-BSD FRML MDRD: 59 ML/MIN/1.73M2
GFR SERPL CREATININE-BSD FRML MDRD: >60 ML/MIN/1.73M2
GLUCOSE BLD-MCNC: 114 MG/DL (ref 74–99)
GLUCOSE BLD-MCNC: 132 MG/DL (ref 74–99)
GLUCOSE BLD-MCNC: 133 MG/DL (ref 74–99)
GLUCOSE BLD-MCNC: 135 MG/DL (ref 74–99)
GLUCOSE BLD-MCNC: 99 MG/DL (ref 74–99)
GLUCOSE SERPL-MCNC: 91 MG/DL (ref 74–99)
GLUCOSE SERPL-MCNC: 94 MG/DL (ref 74–99)
HCT VFR BLD AUTO: 42.7 % (ref 34–48)
HGB BLD-MCNC: 14.1 G/DL (ref 11.5–15.5)
IMM GRANULOCYTES # BLD AUTO: <0.03 K/UL (ref 0–0.58)
IMM GRANULOCYTES NFR BLD: 0 % (ref 0–5)
LYMPHOCYTES NFR BLD: 2.46 K/UL (ref 1.5–4)
LYMPHOCYTES RELATIVE PERCENT: 43 % (ref 20–42)
MAGNESIUM SERPL-MCNC: 1.9 MG/DL (ref 1.6–2.6)
MCH RBC QN AUTO: 31.4 PG (ref 26–35)
MCHC RBC AUTO-ENTMCNC: 33 G/DL (ref 32–34.5)
MCV RBC AUTO: 95.1 FL (ref 80–99.9)
MONOCYTES NFR BLD: 0.42 K/UL (ref 0.1–0.95)
MONOCYTES NFR BLD: 7 % (ref 2–12)
NEUTROPHILS NFR BLD: 44 % (ref 43–80)
NEUTS SEG NFR BLD: 2.49 K/UL (ref 1.8–7.3)
PHOSPHATE SERPL-MCNC: 3.5 MG/DL (ref 2.5–4.5)
PLATELET # BLD AUTO: 263 K/UL (ref 130–450)
PMV BLD AUTO: 9.2 FL (ref 7–12)
POTASSIUM SERPL-SCNC: 2.9 MMOL/L (ref 3.5–5)
POTASSIUM SERPL-SCNC: 4.2 MMOL/L (ref 3.5–5)
RBC # BLD AUTO: 4.49 M/UL (ref 3.5–5.5)
SODIUM SERPL-SCNC: 132 MMOL/L (ref 132–146)
SODIUM SERPL-SCNC: 135 MMOL/L (ref 132–146)
WBC OTHER # BLD: 5.7 K/UL (ref 4.5–11.5)

## 2024-03-09 PROCEDURE — 6370000000 HC RX 637 (ALT 250 FOR IP): Performed by: INTERNAL MEDICINE

## 2024-03-09 PROCEDURE — 2500000003 HC RX 250 WO HCPCS: Performed by: INTERNAL MEDICINE

## 2024-03-09 PROCEDURE — 6360000002 HC RX W HCPCS: Performed by: INTERNAL MEDICINE

## 2024-03-09 PROCEDURE — 1200000000 HC SEMI PRIVATE

## 2024-03-09 PROCEDURE — 83735 ASSAY OF MAGNESIUM: CPT

## 2024-03-09 PROCEDURE — 87040 BLOOD CULTURE FOR BACTERIA: CPT

## 2024-03-09 PROCEDURE — 99232 SBSQ HOSP IP/OBS MODERATE 35: CPT | Performed by: INTERNAL MEDICINE

## 2024-03-09 PROCEDURE — 6370000000 HC RX 637 (ALT 250 FOR IP)

## 2024-03-09 PROCEDURE — 80048 BASIC METABOLIC PNL TOTAL CA: CPT

## 2024-03-09 PROCEDURE — 2580000003 HC RX 258

## 2024-03-09 PROCEDURE — 36415 COLL VENOUS BLD VENIPUNCTURE: CPT

## 2024-03-09 PROCEDURE — 6360000002 HC RX W HCPCS

## 2024-03-09 PROCEDURE — 84100 ASSAY OF PHOSPHORUS: CPT

## 2024-03-09 PROCEDURE — 82962 GLUCOSE BLOOD TEST: CPT

## 2024-03-09 PROCEDURE — 85025 COMPLETE CBC W/AUTO DIFF WBC: CPT

## 2024-03-09 RX ORDER — POTASSIUM CHLORIDE 20 MEQ/1
40 TABLET, EXTENDED RELEASE ORAL ONCE
Status: COMPLETED | OUTPATIENT
Start: 2024-03-09 | End: 2024-03-09

## 2024-03-09 RX ORDER — ONDANSETRON 2 MG/ML
4 INJECTION INTRAMUSCULAR; INTRAVENOUS EVERY 6 HOURS PRN
Status: DISCONTINUED | OUTPATIENT
Start: 2024-03-09 | End: 2024-03-12 | Stop reason: HOSPADM

## 2024-03-09 RX ORDER — DEXTROSE MONOHYDRATE, SODIUM CHLORIDE, AND POTASSIUM CHLORIDE 50; 1.49; 9 G/1000ML; G/1000ML; G/1000ML
INJECTION, SOLUTION INTRAVENOUS CONTINUOUS
Status: DISCONTINUED | OUTPATIENT
Start: 2024-03-09 | End: 2024-03-12 | Stop reason: HOSPADM

## 2024-03-09 RX ORDER — MAGNESIUM SULFATE 1 G/100ML
1000 INJECTION INTRAVENOUS ONCE
Status: COMPLETED | OUTPATIENT
Start: 2024-03-09 | End: 2024-03-09

## 2024-03-09 RX ADMIN — DEXTROSE AND SODIUM CHLORIDE: 5; 900 INJECTION, SOLUTION INTRAVENOUS at 11:27

## 2024-03-09 RX ADMIN — PANTOPRAZOLE SODIUM 40 MG: 40 TABLET, DELAYED RELEASE ORAL at 05:46

## 2024-03-09 RX ADMIN — SODIUM CHLORIDE, PRESERVATIVE FREE 10 ML: 5 INJECTION INTRAVENOUS at 08:21

## 2024-03-09 RX ADMIN — CEFEPIME 2000 MG: 2 INJECTION, POWDER, FOR SOLUTION INTRAVENOUS at 11:29

## 2024-03-09 RX ADMIN — LOPERAMIDE HYDROCHLORIDE 2 MG: 2 CAPSULE ORAL at 20:29

## 2024-03-09 RX ADMIN — MAGNESIUM SULFATE HEPTAHYDRATE 1000 MG: 1 INJECTION, SOLUTION INTRAVENOUS at 10:16

## 2024-03-09 RX ADMIN — Medication: at 10:17

## 2024-03-09 RX ADMIN — APIXABAN 5 MG: 5 TABLET, FILM COATED ORAL at 20:29

## 2024-03-09 RX ADMIN — DEXTROSE AND SODIUM CHLORIDE: 5; 900 INJECTION, SOLUTION INTRAVENOUS at 05:46

## 2024-03-09 RX ADMIN — LEVOTHYROXINE SODIUM 75 MCG: 75 TABLET ORAL at 05:46

## 2024-03-09 RX ADMIN — LOPERAMIDE HYDROCHLORIDE 2 MG: 2 CAPSULE ORAL at 08:22

## 2024-03-09 RX ADMIN — LOPERAMIDE HYDROCHLORIDE 2 MG: 2 CAPSULE ORAL at 11:25

## 2024-03-09 RX ADMIN — APIXABAN 5 MG: 5 TABLET, FILM COATED ORAL at 08:21

## 2024-03-09 RX ADMIN — CEFEPIME 2000 MG: 2 INJECTION, POWDER, FOR SOLUTION INTRAVENOUS at 00:28

## 2024-03-09 RX ADMIN — SERTRALINE 50 MG: 100 TABLET, FILM COATED ORAL at 08:22

## 2024-03-09 RX ADMIN — POTASSIUM CHLORIDE 40 MEQ: 1500 TABLET, EXTENDED RELEASE ORAL at 10:17

## 2024-03-09 RX ADMIN — LOPERAMIDE HYDROCHLORIDE 2 MG: 2 CAPSULE ORAL at 15:39

## 2024-03-09 RX ADMIN — TRAZODONE HYDROCHLORIDE 100 MG: 50 TABLET ORAL at 20:29

## 2024-03-09 RX ADMIN — Medication: at 17:28

## 2024-03-09 RX ADMIN — POTASSIUM CHLORIDE, DEXTROSE MONOHYDRATE AND SODIUM CHLORIDE: 150; 5; 900 INJECTION, SOLUTION INTRAVENOUS at 17:27

## 2024-03-09 RX ADMIN — POTASSIUM CHLORIDE 40 MEQ: 1500 TABLET, EXTENDED RELEASE ORAL at 15:39

## 2024-03-09 RX ADMIN — ONDANSETRON 4 MG: 2 INJECTION INTRAMUSCULAR; INTRAVENOUS at 14:52

## 2024-03-09 NOTE — PLAN OF CARE
Problem: Discharge Planning  Goal: Discharge to home or other facility with appropriate resources  Outcome: Progressing     Problem: Safety - Adult  Goal: Free from fall injury  3/9/2024 0928 by Keesha Johnson, RN  Outcome: Progressing  3/8/2024 2330 by Alyssa Chavez, RN  Outcome: Progressing

## 2024-03-10 LAB
ACB COMPLEX DNA BLD POS QL NAA+NON-PROBE: NOT DETECTED
ALBUMIN SERPL-MCNC: 3.6 G/DL (ref 3.5–5.2)
ALP SERPL-CCNC: 126 U/L (ref 35–104)
ALT SERPL-CCNC: 21 U/L (ref 0–32)
ANION GAP SERPL CALCULATED.3IONS-SCNC: 9 MMOL/L (ref 7–16)
AST SERPL-CCNC: 38 U/L (ref 0–31)
B FRAGILIS DNA BLD POS QL NAA+NON-PROBE: NOT DETECTED
BASOPHILS # BLD: 0.03 K/UL (ref 0–0.2)
BASOPHILS NFR BLD: 1 % (ref 0–2)
BILIRUB SERPL-MCNC: 0.4 MG/DL (ref 0–1.2)
BIOFIRE TEST COMMENT: ABNORMAL
BUN SERPL-MCNC: 17 MG/DL (ref 6–20)
C ALBICANS DNA BLD POS QL NAA+NON-PROBE: NOT DETECTED
C AURIS DNA BLD POS QL NAA+NON-PROBE: NOT DETECTED
C GATTII+NEOFOR DNA BLD POS QL NAA+N-PRB: NOT DETECTED
C GLABRATA DNA BLD POS QL NAA+NON-PROBE: NOT DETECTED
C KRUSEI DNA BLD POS QL NAA+NON-PROBE: NOT DETECTED
C PARAP DNA BLD POS QL NAA+NON-PROBE: NOT DETECTED
C TROPICLS DNA BLD POS QL NAA+NON-PROBE: NOT DETECTED
CA-I BLD-SCNC: 1.26 MMOL/L (ref 1.15–1.33)
CALCIUM SERPL-MCNC: 9.1 MG/DL (ref 8.6–10.2)
CHLORIDE SERPL-SCNC: 106 MMOL/L (ref 98–107)
CO2 SERPL-SCNC: 21 MMOL/L (ref 22–29)
CREAT SERPL-MCNC: 1 MG/DL (ref 0.5–1)
E CLOAC COMP DNA BLD POS NAA+NON-PROBE: NOT DETECTED
E COLI DNA BLD POS QL NAA+NON-PROBE: NOT DETECTED
E FAECALIS DNA BLD POS QL NAA+NON-PROBE: NOT DETECTED
E FAECIUM DNA BLD POS QL NAA+NON-PROBE: NOT DETECTED
ENTEROBACTERALES DNA BLD POS NAA+N-PRB: NOT DETECTED
EOSINOPHIL # BLD: 0.29 K/UL (ref 0.05–0.5)
EOSINOPHILS RELATIVE PERCENT: 6 % (ref 0–6)
ERYTHROCYTE [DISTWIDTH] IN BLOOD BY AUTOMATED COUNT: 12.9 % (ref 11.5–15)
GFR SERPL CREATININE-BSD FRML MDRD: >60 ML/MIN/1.73M2
GLUCOSE BLD-MCNC: 107 MG/DL (ref 74–99)
GLUCOSE BLD-MCNC: 83 MG/DL (ref 74–99)
GLUCOSE BLD-MCNC: 87 MG/DL (ref 74–99)
GLUCOSE SERPL-MCNC: 94 MG/DL (ref 74–99)
GP B STREP DNA BLD POS QL NAA+NON-PROBE: NOT DETECTED
HAEM INFLU DNA BLD POS QL NAA+NON-PROBE: NOT DETECTED
HCT VFR BLD AUTO: 37.8 % (ref 34–48)
HGB BLD-MCNC: 12.4 G/DL (ref 11.5–15.5)
IMM GRANULOCYTES # BLD AUTO: <0.03 K/UL (ref 0–0.58)
IMM GRANULOCYTES NFR BLD: 0 % (ref 0–5)
K OXYTOCA DNA BLD POS QL NAA+NON-PROBE: NOT DETECTED
KLEBSIELLA SP DNA BLD POS QL NAA+NON-PRB: NOT DETECTED
KLEBSIELLA SP DNA BLD POS QL NAA+NON-PRB: NOT DETECTED
L MONOCYTOG DNA BLD POS QL NAA+NON-PROBE: NOT DETECTED
LYMPHOCYTES NFR BLD: 2.33 K/UL (ref 1.5–4)
LYMPHOCYTES RELATIVE PERCENT: 47 % (ref 20–42)
MAGNESIUM SERPL-MCNC: 1.9 MG/DL (ref 1.6–2.6)
MCH RBC QN AUTO: 31.4 PG (ref 26–35)
MCHC RBC AUTO-ENTMCNC: 32.8 G/DL (ref 32–34.5)
MCV RBC AUTO: 95.7 FL (ref 80–99.9)
MICROORGANISM SPEC CULT: ABNORMAL
MICROORGANISM/AGENT SPEC: ABNORMAL
MONOCYTES NFR BLD: 0.44 K/UL (ref 0.1–0.95)
MONOCYTES NFR BLD: 9 % (ref 2–12)
N MEN DNA BLD POS QL NAA+NON-PROBE: NOT DETECTED
NEUTROPHILS NFR BLD: 38 % (ref 43–80)
NEUTS SEG NFR BLD: 1.91 K/UL (ref 1.8–7.3)
P AERUGINOSA DNA BLD POS NAA+NON-PROBE: NOT DETECTED
PHOSPHATE SERPL-MCNC: 2.7 MG/DL (ref 2.5–4.5)
PLATELET # BLD AUTO: 199 K/UL (ref 130–450)
PMV BLD AUTO: 9.2 FL (ref 7–12)
POTASSIUM SERPL-SCNC: 4.1 MMOL/L (ref 3.5–5)
PROT SERPL-MCNC: 6.7 G/DL (ref 6.4–8.3)
PROTEUS SP DNA BLD POS QL NAA+NON-PROBE: NOT DETECTED
RBC # BLD AUTO: 3.95 M/UL (ref 3.5–5.5)
S AUREUS DNA BLD POS QL NAA+NON-PROBE: NOT DETECTED
S AUREUS+CONS DNA BLD POS NAA+NON-PROBE: NOT DETECTED
S EPIDERMIDIS DNA BLD POS QL NAA+NON-PRB: NOT DETECTED
S LUGDUNENSIS DNA BLD POS QL NAA+NON-PRB: NOT DETECTED
S MALTOPHILIA DNA BLD POS QL NAA+NON-PRB: NOT DETECTED
S MARCESCENS DNA BLD POS NAA+NON-PROBE: NOT DETECTED
S PNEUM DNA BLD POS QL NAA+NON-PROBE: NOT DETECTED
S PYO DNA BLD POS QL NAA+NON-PROBE: NOT DETECTED
SALMONELLA DNA BLD POS QL NAA+NON-PROBE: NOT DETECTED
SERVICE CMNT-IMP: ABNORMAL
SODIUM SERPL-SCNC: 136 MMOL/L (ref 132–146)
SPECIMEN DESCRIPTION: ABNORMAL
STREPTOCOCCUS DNA BLD POS NAA+NON-PROBE: NOT DETECTED
WBC OTHER # BLD: 5 K/UL (ref 4.5–11.5)

## 2024-03-10 PROCEDURE — 82330 ASSAY OF CALCIUM: CPT

## 2024-03-10 PROCEDURE — 6370000000 HC RX 637 (ALT 250 FOR IP)

## 2024-03-10 PROCEDURE — 99232 SBSQ HOSP IP/OBS MODERATE 35: CPT | Performed by: INTERNAL MEDICINE

## 2024-03-10 PROCEDURE — 36415 COLL VENOUS BLD VENIPUNCTURE: CPT

## 2024-03-10 PROCEDURE — 6360000002 HC RX W HCPCS

## 2024-03-10 PROCEDURE — 2500000003 HC RX 250 WO HCPCS: Performed by: INTERNAL MEDICINE

## 2024-03-10 PROCEDURE — 1200000000 HC SEMI PRIVATE

## 2024-03-10 PROCEDURE — 83735 ASSAY OF MAGNESIUM: CPT

## 2024-03-10 PROCEDURE — 85025 COMPLETE CBC W/AUTO DIFF WBC: CPT

## 2024-03-10 PROCEDURE — 84100 ASSAY OF PHOSPHORUS: CPT

## 2024-03-10 PROCEDURE — 82962 GLUCOSE BLOOD TEST: CPT

## 2024-03-10 PROCEDURE — 2580000003 HC RX 258

## 2024-03-10 PROCEDURE — 80053 COMPREHEN METABOLIC PANEL: CPT

## 2024-03-10 PROCEDURE — 2580000003 HC RX 258: Performed by: NURSE PRACTITIONER

## 2024-03-10 RX ORDER — 0.9 % SODIUM CHLORIDE 0.9 %
500 INTRAVENOUS SOLUTION INTRAVENOUS ONCE
Status: COMPLETED | OUTPATIENT
Start: 2024-03-10 | End: 2024-03-10

## 2024-03-10 RX ADMIN — LEVOTHYROXINE SODIUM 75 MCG: 75 TABLET ORAL at 05:41

## 2024-03-10 RX ADMIN — PANTOPRAZOLE SODIUM 40 MG: 40 TABLET, DELAYED RELEASE ORAL at 05:41

## 2024-03-10 RX ADMIN — POTASSIUM CHLORIDE, DEXTROSE MONOHYDRATE AND SODIUM CHLORIDE: 150; 5; 900 INJECTION, SOLUTION INTRAVENOUS at 08:27

## 2024-03-10 RX ADMIN — LOPERAMIDE HYDROCHLORIDE 2 MG: 2 CAPSULE ORAL at 20:30

## 2024-03-10 RX ADMIN — LOPERAMIDE HYDROCHLORIDE 2 MG: 2 CAPSULE ORAL at 17:58

## 2024-03-10 RX ADMIN — SERTRALINE 50 MG: 100 TABLET, FILM COATED ORAL at 08:26

## 2024-03-10 RX ADMIN — CEFEPIME 2000 MG: 2 INJECTION, POWDER, FOR SOLUTION INTRAVENOUS at 12:42

## 2024-03-10 RX ADMIN — CEFEPIME 2000 MG: 2 INJECTION, POWDER, FOR SOLUTION INTRAVENOUS at 00:07

## 2024-03-10 RX ADMIN — APIXABAN 5 MG: 5 TABLET, FILM COATED ORAL at 08:26

## 2024-03-10 RX ADMIN — APIXABAN 5 MG: 5 TABLET, FILM COATED ORAL at 20:30

## 2024-03-10 RX ADMIN — SODIUM CHLORIDE, PRESERVATIVE FREE 10 ML: 5 INJECTION INTRAVENOUS at 20:30

## 2024-03-10 RX ADMIN — TRAZODONE HYDROCHLORIDE 100 MG: 50 TABLET ORAL at 20:30

## 2024-03-10 RX ADMIN — SODIUM CHLORIDE 500 ML: 9 INJECTION, SOLUTION INTRAVENOUS at 07:01

## 2024-03-10 RX ADMIN — LOPERAMIDE HYDROCHLORIDE 2 MG: 2 CAPSULE ORAL at 08:26

## 2024-03-10 RX ADMIN — LOPERAMIDE HYDROCHLORIDE 2 MG: 2 CAPSULE ORAL at 12:42

## 2024-03-10 NOTE — PLAN OF CARE
Problem: Discharge Planning  Goal: Discharge to home or other facility with appropriate resources  3/9/2024 2249 by Lindsey Trammell, RN  Outcome: Progressing  3/9/2024 0928 by Keesha Johnson RN  Outcome: Progressing     Problem: Safety - Adult  Goal: Free from fall injury  3/9/2024 2249 by Lindsey Trammell, RN  Outcome: Progressing  3/9/2024 0928 by Keesha Johnson RN  Outcome: Progressing     Problem: Chronic Conditions and Co-morbidities  Goal: Patient's chronic conditions and co-morbidity symptoms are monitored and maintained or improved  Outcome: Progressing     Problem: Nutrition Deficit:  Goal: Optimize nutritional status  Outcome: Progressing

## 2024-03-11 LAB
ALBUMIN SERPL-MCNC: 3.3 G/DL (ref 3.5–5.2)
ALP SERPL-CCNC: 117 U/L (ref 35–104)
ALT SERPL-CCNC: 19 U/L (ref 0–32)
ANION GAP SERPL CALCULATED.3IONS-SCNC: 8 MMOL/L (ref 7–16)
AST SERPL-CCNC: 28 U/L (ref 0–31)
BASOPHILS # BLD: 0.03 K/UL (ref 0–0.2)
BASOPHILS NFR BLD: 1 % (ref 0–2)
BILIRUB SERPL-MCNC: 0.4 MG/DL (ref 0–1.2)
BUN SERPL-MCNC: 10 MG/DL (ref 6–20)
CALCIUM SERPL-MCNC: 9.1 MG/DL (ref 8.6–10.2)
CHLORIDE SERPL-SCNC: 107 MMOL/L (ref 98–107)
CO2 SERPL-SCNC: 20 MMOL/L (ref 22–29)
CREAT SERPL-MCNC: 1 MG/DL (ref 0.5–1)
EOSINOPHIL # BLD: 0.22 K/UL (ref 0.05–0.5)
EOSINOPHILS RELATIVE PERCENT: 4 % (ref 0–6)
ERYTHROCYTE [DISTWIDTH] IN BLOOD BY AUTOMATED COUNT: 12.7 % (ref 11.5–15)
GFR SERPL CREATININE-BSD FRML MDRD: >60 ML/MIN/1.73M2
GLUCOSE BLD-MCNC: 105 MG/DL (ref 74–99)
GLUCOSE BLD-MCNC: 127 MG/DL (ref 74–99)
GLUCOSE BLD-MCNC: 86 MG/DL (ref 74–99)
GLUCOSE BLD-MCNC: 90 MG/DL (ref 74–99)
GLUCOSE SERPL-MCNC: 83 MG/DL (ref 74–99)
HCT VFR BLD AUTO: 33.9 % (ref 34–48)
HGB BLD-MCNC: 11.2 G/DL (ref 11.5–15.5)
IMM GRANULOCYTES # BLD AUTO: <0.03 K/UL (ref 0–0.58)
IMM GRANULOCYTES NFR BLD: 0 % (ref 0–5)
LYMPHOCYTES NFR BLD: 2.36 K/UL (ref 1.5–4)
LYMPHOCYTES RELATIVE PERCENT: 46 % (ref 20–42)
MAGNESIUM SERPL-MCNC: 1.7 MG/DL (ref 1.6–2.6)
MCH RBC QN AUTO: 31.5 PG (ref 26–35)
MCHC RBC AUTO-ENTMCNC: 33 G/DL (ref 32–34.5)
MCV RBC AUTO: 95.2 FL (ref 80–99.9)
MICROORGANISM SPEC CULT: NO GROWTH
MONOCYTES NFR BLD: 0.46 K/UL (ref 0.1–0.95)
MONOCYTES NFR BLD: 9 % (ref 2–12)
NEUTROPHILS NFR BLD: 41 % (ref 43–80)
NEUTS SEG NFR BLD: 2.11 K/UL (ref 1.8–7.3)
PHOSPHATE SERPL-MCNC: 3.3 MG/DL (ref 2.5–4.5)
PLATELET # BLD AUTO: 168 K/UL (ref 130–450)
PMV BLD AUTO: 9.1 FL (ref 7–12)
POTASSIUM SERPL-SCNC: 3.5 MMOL/L (ref 3.5–5)
PROT SERPL-MCNC: 6.3 G/DL (ref 6.4–8.3)
RBC # BLD AUTO: 3.56 M/UL (ref 3.5–5.5)
SODIUM SERPL-SCNC: 135 MMOL/L (ref 132–146)
SPECIMEN DESCRIPTION: NORMAL
WBC OTHER # BLD: 5.2 K/UL (ref 4.5–11.5)

## 2024-03-11 PROCEDURE — 76937 US GUIDE VASCULAR ACCESS: CPT

## 2024-03-11 PROCEDURE — 6370000000 HC RX 637 (ALT 250 FOR IP)

## 2024-03-11 PROCEDURE — 02HV33Z INSERTION OF INFUSION DEVICE INTO SUPERIOR VENA CAVA, PERCUTANEOUS APPROACH: ICD-10-PCS | Performed by: SPECIALIST

## 2024-03-11 PROCEDURE — 85025 COMPLETE CBC W/AUTO DIFF WBC: CPT

## 2024-03-11 PROCEDURE — 82962 GLUCOSE BLOOD TEST: CPT

## 2024-03-11 PROCEDURE — 2580000003 HC RX 258

## 2024-03-11 PROCEDURE — 2580000003 HC RX 258: Performed by: REGISTERED NURSE

## 2024-03-11 PROCEDURE — C1751 CATH, INF, PER/CENT/MIDLINE: HCPCS

## 2024-03-11 PROCEDURE — 1200000000 HC SEMI PRIVATE

## 2024-03-11 PROCEDURE — 36569 INSJ PICC 5 YR+ W/O IMAGING: CPT

## 2024-03-11 PROCEDURE — 83735 ASSAY OF MAGNESIUM: CPT

## 2024-03-11 PROCEDURE — 80053 COMPREHEN METABOLIC PANEL: CPT

## 2024-03-11 PROCEDURE — 2500000003 HC RX 250 WO HCPCS: Performed by: REGISTERED NURSE

## 2024-03-11 PROCEDURE — 99232 SBSQ HOSP IP/OBS MODERATE 35: CPT | Performed by: INTERNAL MEDICINE

## 2024-03-11 PROCEDURE — 2500000003 HC RX 250 WO HCPCS: Performed by: INTERNAL MEDICINE

## 2024-03-11 PROCEDURE — 6360000002 HC RX W HCPCS

## 2024-03-11 PROCEDURE — 84100 ASSAY OF PHOSPHORUS: CPT

## 2024-03-11 RX ORDER — SODIUM CHLORIDE 9 MG/ML
INJECTION, SOLUTION INTRAVENOUS PRN
Status: DISCONTINUED | OUTPATIENT
Start: 2024-03-11 | End: 2024-03-12 | Stop reason: HOSPADM

## 2024-03-11 RX ORDER — SODIUM CHLORIDE 0.9 % (FLUSH) 0.9 %
5-40 SYRINGE (ML) INJECTION EVERY 12 HOURS SCHEDULED
Status: DISCONTINUED | OUTPATIENT
Start: 2024-03-11 | End: 2024-03-12 | Stop reason: HOSPADM

## 2024-03-11 RX ORDER — SODIUM CHLORIDE 0.9 % (FLUSH) 0.9 %
5-40 SYRINGE (ML) INJECTION PRN
Status: DISCONTINUED | OUTPATIENT
Start: 2024-03-11 | End: 2024-03-12 | Stop reason: HOSPADM

## 2024-03-11 RX ORDER — LIDOCAINE HYDROCHLORIDE 10 MG/ML
5 INJECTION, SOLUTION EPIDURAL; INFILTRATION; INTRACAUDAL; PERINEURAL ONCE
Status: COMPLETED | OUTPATIENT
Start: 2024-03-11 | End: 2024-03-11

## 2024-03-11 RX ORDER — HEPARIN 100 UNIT/ML
3 SYRINGE INTRAVENOUS PRN
Status: DISCONTINUED | OUTPATIENT
Start: 2024-03-11 | End: 2024-03-12 | Stop reason: HOSPADM

## 2024-03-11 RX ORDER — HEPARIN 100 UNIT/ML
3 SYRINGE INTRAVENOUS EVERY 12 HOURS SCHEDULED
Status: DISCONTINUED | OUTPATIENT
Start: 2024-03-11 | End: 2024-03-12 | Stop reason: HOSPADM

## 2024-03-11 RX ADMIN — LIDOCAINE HYDROCHLORIDE 0.5 ML: 10 SOLUTION INTRAVENOUS at 14:41

## 2024-03-11 RX ADMIN — SERTRALINE 50 MG: 100 TABLET, FILM COATED ORAL at 08:31

## 2024-03-11 RX ADMIN — LOPERAMIDE HYDROCHLORIDE 2 MG: 2 CAPSULE ORAL at 16:47

## 2024-03-11 RX ADMIN — PANTOPRAZOLE SODIUM 40 MG: 40 TABLET, DELAYED RELEASE ORAL at 06:05

## 2024-03-11 RX ADMIN — SODIUM CHLORIDE, PRESERVATIVE FREE 10 ML: 5 INJECTION INTRAVENOUS at 21:07

## 2024-03-11 RX ADMIN — LEVOTHYROXINE SODIUM 75 MCG: 75 TABLET ORAL at 06:05

## 2024-03-11 RX ADMIN — APIXABAN 5 MG: 5 TABLET, FILM COATED ORAL at 21:07

## 2024-03-11 RX ADMIN — LOPERAMIDE HYDROCHLORIDE 2 MG: 2 CAPSULE ORAL at 13:13

## 2024-03-11 RX ADMIN — CEFEPIME 2000 MG: 2 INJECTION, POWDER, FOR SOLUTION INTRAVENOUS at 00:21

## 2024-03-11 RX ADMIN — APIXABAN 5 MG: 5 TABLET, FILM COATED ORAL at 08:32

## 2024-03-11 RX ADMIN — POTASSIUM CHLORIDE, DEXTROSE MONOHYDRATE AND SODIUM CHLORIDE: 150; 5; 900 INJECTION, SOLUTION INTRAVENOUS at 04:36

## 2024-03-11 RX ADMIN — TRAZODONE HYDROCHLORIDE 100 MG: 50 TABLET ORAL at 21:07

## 2024-03-11 RX ADMIN — POTASSIUM CHLORIDE, DEXTROSE MONOHYDRATE AND SODIUM CHLORIDE: 150; 5; 900 INJECTION, SOLUTION INTRAVENOUS at 16:52

## 2024-03-11 RX ADMIN — LOPERAMIDE HYDROCHLORIDE 2 MG: 2 CAPSULE ORAL at 21:07

## 2024-03-11 RX ADMIN — LOPERAMIDE HYDROCHLORIDE 2 MG: 2 CAPSULE ORAL at 08:32

## 2024-03-11 RX ADMIN — CEFEPIME 2000 MG: 2 INJECTION, POWDER, FOR SOLUTION INTRAVENOUS at 13:12

## 2024-03-11 NOTE — DISCHARGE INSTRUCTIONS
the infusion and blood work to accommodate the patient’s home care conditions.  When PICC or VAD is to be removed, documentation of length of inserted PICC. PICC or VAD length is to correlate with inserted length and sent to physician at the time of removal.  Give the patient a list of antibiotics being administered with:  Drug name  Route  Frequency  Start/Stop date      ROUTINE LABS TO BE DRAWN/ORDERED:  Twice weekly (preferably every Monday & Thursday):  CMP  Complete Blood Count with differential (CBC with dif)  Once weekly:  C-Reactive Protein (not high sensitivity CRP)  Erythrocyte/Westergren Sedimentation Rate (WSR or ESR)  Total CPK for patients on Daptomycin (Cubicin®). Obtain CPK more often if the patient is experiencing muscle weakness or myalgias.  Clinical Pharmacist is to adjust Vancomycin and Aminoglycosides.  Clinical pharmacist is  encouraged to follow: \"Therapeutic Monitoring of Vancomycin for Serious Methicillin-resistant Staphylococcus aureus Infections: A Revised Consensus Guideline and Review by the American Society of Health-system Pharmacists, the Infectious Diseases Society of [place], the Pediatric Infectious Diseases Society, and the Society of Infectious Diseases Pharmacists\" (https://doi.org/10.1093/jerome/mtec118).  If a clinical calculator is not available, clinical pharmacist is to follow the orders below:  Draw Vancomycin trough 30 minutes before the third dose  After starting drug, or   After the dosing or interval is changed.  If the trough level is between 5 and 20 continue dose as ordered.  Draw troughs twice weekly thereafter until troughs are stable (i.e. until trough is between 10 and 20 mcg/ml for two consecutive laboratory values).  Once stable check troughs once weekly or every third dose.  Please do not call physician unless the trough is < 5 or >20.  If the trough is <20 continue dosing as ordered.  If the trough is >20 call the office for further orders.  Do not hold the

## 2024-03-11 NOTE — CARE COORDINATION
3/11/2024  Social Work Discharge Planning:Per Pt, she follows with [facility] outpt for PICC needs. Per liaison, they will need a SHWETA order for TPN (if needed) and ATB scripts faxed to [phone]-they will need to run the insurance coverage for the ATB. MADONNA made a referral to [facility] for IVATB;however, Luis with [facility] said that Leyla with [facility] can come out tomorrow to teach Pt how to administer her IVATB since she's already managing her own TPN.- Pt is agreeable.Pt is independent from home with sig other. Room air. Electronically signed by MENG Bernal on 3/11/2024 at 9:59 AM       3/11/2024.Social Work Discharge Planning: MADONNA faxed ATB script to [facility]. Leyla with [facility] will go out to see Pt tomorrow morning at her home to instruct on IV ATB administration. Electronically signed by MENG Bernal on 3/11/2024 at 2:30 PM

## 2024-03-11 NOTE — PROCEDURES
PICC    Catheter insertion date: 3/11/2024     Product Number:  [id number]   Lot No: [id number]   Gauge: 18X2   Lumen: double   L Basilic    Vein Diameter: 0.44cm   Arm circumference at insertion site: 26cm   Catheter Length: 39cm   Internal Length: 39cm   External Catheter Length: 0cm   Ultrasound Used: yes  RIDGE Mcguiree confirms PICC tip is placed in the lower 1/3 of the SVC or at the Cavoatrial junction.  Floor nurse notified PICC is okay to use.   : SARAH Zambrano RN [facility]

## 2024-03-12 VITALS
DIASTOLIC BLOOD PRESSURE: 57 MMHG | OXYGEN SATURATION: 100 % | HEIGHT: 61 IN | BODY MASS INDEX: 26.35 KG/M2 | RESPIRATION RATE: 16 BRPM | SYSTOLIC BLOOD PRESSURE: 101 MMHG | TEMPERATURE: 98.5 F | WEIGHT: 139.55 LBS | HEART RATE: 61 BPM

## 2024-03-12 LAB
ACB COMPLEX DNA BLD POS QL NAA+NON-PROBE: NOT DETECTED
B FRAGILIS DNA BLD POS QL NAA+NON-PROBE: NOT DETECTED
BIOFIRE TEST COMMENT: ABNORMAL
C ALBICANS DNA BLD POS QL NAA+NON-PROBE: NOT DETECTED
C AURIS DNA BLD POS QL NAA+NON-PROBE: NOT DETECTED
C GATTII+NEOFOR DNA BLD POS QL NAA+N-PRB: NOT DETECTED
C GLABRATA DNA BLD POS QL NAA+NON-PROBE: NOT DETECTED
C KRUSEI DNA BLD POS QL NAA+NON-PROBE: NOT DETECTED
C PARAP DNA BLD POS QL NAA+NON-PROBE: NOT DETECTED
C TROPICLS DNA BLD POS QL NAA+NON-PROBE: NOT DETECTED
E CLOAC COMP DNA BLD POS NAA+NON-PROBE: NOT DETECTED
E COLI DNA BLD POS QL NAA+NON-PROBE: NOT DETECTED
E FAECALIS DNA BLD POS QL NAA+NON-PROBE: NOT DETECTED
E FAECIUM DNA BLD POS QL NAA+NON-PROBE: NOT DETECTED
ENTEROBACTERALES DNA BLD POS NAA+N-PRB: NOT DETECTED
GLUCOSE BLD-MCNC: 111 MG/DL (ref 74–99)
GLUCOSE BLD-MCNC: 85 MG/DL (ref 74–99)
GP B STREP DNA BLD POS QL NAA+NON-PROBE: NOT DETECTED
HAEM INFLU DNA BLD POS QL NAA+NON-PROBE: NOT DETECTED
K OXYTOCA DNA BLD POS QL NAA+NON-PROBE: NOT DETECTED
KLEBSIELLA SP DNA BLD POS QL NAA+NON-PRB: NOT DETECTED
KLEBSIELLA SP DNA BLD POS QL NAA+NON-PRB: NOT DETECTED
L MONOCYTOG DNA BLD POS QL NAA+NON-PROBE: NOT DETECTED
MICROORGANISM SPEC CULT: ABNORMAL
MICROORGANISM/AGENT SPEC: ABNORMAL
N MEN DNA BLD POS QL NAA+NON-PROBE: NOT DETECTED
P AERUGINOSA DNA BLD POS NAA+NON-PROBE: NOT DETECTED
PROTEUS SP DNA BLD POS QL NAA+NON-PROBE: NOT DETECTED
S AUREUS DNA BLD POS QL NAA+NON-PROBE: NOT DETECTED
S AUREUS+CONS DNA BLD POS NAA+NON-PROBE: NOT DETECTED
S EPIDERMIDIS DNA BLD POS QL NAA+NON-PRB: NOT DETECTED
S LUGDUNENSIS DNA BLD POS QL NAA+NON-PRB: NOT DETECTED
S MALTOPHILIA DNA BLD POS QL NAA+NON-PRB: NOT DETECTED
S MARCESCENS DNA BLD POS NAA+NON-PROBE: NOT DETECTED
S PNEUM DNA BLD POS QL NAA+NON-PROBE: NOT DETECTED
S PYO DNA BLD POS QL NAA+NON-PROBE: NOT DETECTED
SALMONELLA DNA BLD POS QL NAA+NON-PROBE: NOT DETECTED
SERVICE CMNT-IMP: ABNORMAL
SPECIMEN DESCRIPTION: ABNORMAL
STREPTOCOCCUS DNA BLD POS NAA+NON-PROBE: NOT DETECTED

## 2024-03-12 PROCEDURE — 2580000003 HC RX 258

## 2024-03-12 PROCEDURE — 99239 HOSP IP/OBS DSCHRG MGMT >30: CPT | Performed by: INTERNAL MEDICINE

## 2024-03-12 PROCEDURE — 82962 GLUCOSE BLOOD TEST: CPT

## 2024-03-12 PROCEDURE — 6360000002 HC RX W HCPCS

## 2024-03-12 PROCEDURE — 6360000002 HC RX W HCPCS: Performed by: REGISTERED NURSE

## 2024-03-12 PROCEDURE — 6370000000 HC RX 637 (ALT 250 FOR IP)

## 2024-03-12 RX ADMIN — CEFEPIME 2000 MG: 2 INJECTION, POWDER, FOR SOLUTION INTRAVENOUS at 00:24

## 2024-03-12 RX ADMIN — SERTRALINE 50 MG: 100 TABLET, FILM COATED ORAL at 09:52

## 2024-03-12 RX ADMIN — LEVOTHYROXINE SODIUM 75 MCG: 75 TABLET ORAL at 06:28

## 2024-03-12 RX ADMIN — PANTOPRAZOLE SODIUM 40 MG: 40 TABLET, DELAYED RELEASE ORAL at 06:28

## 2024-03-12 RX ADMIN — CEFEPIME 2000 MG: 2 INJECTION, POWDER, FOR SOLUTION INTRAVENOUS at 12:35

## 2024-03-12 RX ADMIN — LOPERAMIDE HYDROCHLORIDE 2 MG: 2 CAPSULE ORAL at 09:58

## 2024-03-12 RX ADMIN — APIXABAN 5 MG: 5 TABLET, FILM COATED ORAL at 09:53

## 2024-03-12 RX ADMIN — HEPARIN 300 UNITS: 100 SYRINGE at 09:53

## 2024-03-12 RX ADMIN — LOPERAMIDE HYDROCHLORIDE 2 MG: 2 CAPSULE ORAL at 13:30

## 2024-03-12 NOTE — PLAN OF CARE
Problem: Discharge Planning  Goal: Discharge to home or other facility with appropriate resources  Outcome: Progressing     Problem: Safety - Adult  Goal: Free from fall injury  Outcome: Progressing     Problem: Chronic Conditions and Co-morbidities  Goal: Patient's chronic conditions and co-morbidity symptoms are monitored and maintained or improved  Outcome: Progressing     Problem: Nutrition Deficit:  Goal: Optimize nutritional status  Outcome: Progressing

## 2024-03-12 NOTE — PROGRESS NOTES
[facility] Hospitalist   Progress Note    Admitting Date and Time: 3/7/2024 10:52 PM  Admit Dx: Bacteremia [R78.81]  Positive blood culture [R78.81]    Subjective:    Patient was admitted with Bacteremia [R78.81]  Positive blood culture [R78.81]. Patient denies fever, chills, cp, sob, n/v.     sodium chloride flush  5-40 mL IntraVENous 2 times per day    apixaban  5 mg Oral BID    levothyroxine  75 mcg Oral Daily    pantoprazole  40 mg Oral QAM AC    cefepime  2,000 mg IntraVENous Q12H    traZODone  100 mg Oral Nightly    sertraline  50 mg Oral Daily    loperamide  2 mg Oral 4x Daily     diphenhydrAMINE-zinc acetate, , TID PRN  ondansetron, 4 mg, Q6H PRN  sodium chloride flush, 5-40 mL, PRN  sodium chloride, , PRN  polyethylene glycol, 17 g, Daily PRN  acetaminophen, 650 mg, Q6H PRN   Or  acetaminophen, 650 mg, Q6H PRN         Objective:    BP (!) 97/53   Pulse 79   Temp 98.6 °F (37 °C) (Oral)   Resp 18   Ht 1.549 m (5' 1\")   Wt 61.9 kg (136 lb 7.4 oz)   SpO2 100%   BMI 25.78 kg/m²   Skin: warm and dry, no rash or erythema  Pulmonary/Chest: clear to auscultation bilaterally- no wheezes, rales or rhonchi, normal air movement, no respiratory distress  Cardiovascular: rhythm reg at rate of 80  Abdomen: soft, non-tender, non-distended, normal bowel sounds, no masses or organomegaly  Extremities: no cyanosis, no clubbing, and no edema      Recent Labs     03/08/24  1210 03/09/24  0438 03/09/24  1650    132 135   K 3.9 2.9* 4.2   CL 96* 97* 102   CO2 25 24 24   BUN 35* 29* 22*   CREATININE 1.1* 1.1* 1.0   GLUCOSE 93 91 94   CALCIUM 9.7 9.3 9.2       Recent Labs     03/07/24  2347 03/09/24  0438   WBC 9.0 5.7   RBC 4.51 4.49   HGB 14.4 14.1   HCT 42.4 42.7   MCV 94.0 95.1   MCH 31.9 31.4   MCHC 34.0 33.0   RDW 13.0 12.9    263   MPV 9.2 9.2       CBC with Differential:    Lab Results   Component Value Date/Time    WBC 5.7 03/09/2024 04:38 AM    RBC 4.49 03/09/2024 04:38 AM    HGB 14.1 
  [facility]  [facility]  Progress Note    SUBJECTIVE:  Chief Complaint   Patient presents with    Abnormal Lab     Blood culture positive for RHIZOBIUM RADIOBACTER         Patient is tolerating medications. No reported adverse drug reactions.  No nausea, vomiting, diarrhea.    Review of systems:  As stated above in the chief complaint, otherwise negative.    Medications:  Scheduled Meds:   sodium chloride flush  5-40 mL IntraVENous 2 times per day    apixaban  5 mg Oral BID    levothyroxine  75 mcg Oral Daily    pantoprazole  40 mg Oral QAM AC    cefepime  2,000 mg IntraVENous Q12H    traZODone  100 mg Oral Nightly    sertraline  50 mg Oral Daily    loperamide  2 mg Oral 4x Daily     Continuous Infusions:   dextrose 5% and 0.9% NaCl with KCl 20 mEq 100 mL/hr at 03/10/24 0827    sodium chloride       PRN Meds:diphenhydrAMINE-zinc acetate, ondansetron, sodium chloride flush, sodium chloride, polyethylene glycol, acetaminophen **OR** acetaminophen    OBJECTIVE:  BP (!) 76/46   Pulse 53   Temp 97.8 °F (36.6 °C) (Oral)   Resp 18   Ht 1.549 m (5' 1\")   Wt 63 kg (138 lb 14.2 oz)   SpO2 98%   BMI 26.24 kg/m²   Temp  Av.2 °F (36.8 °C)  Min: 97.8 °F (36.6 °C)  Max: 98.6 °F (37 °C)  Constitutional: The patient is awake, alert, and oriented.   Skin: Warm and dry. No rashes were noted.   HEENT: Round and reactive pupils.  Moist mucous membranes.  No ulcerations or thrush.  Neck: Supple to movements.   Chest: No use of accessory muscles to breathe. Symmetrical expansion.  No wheezing, crackles or rhonchi.  Cardiovascular: S1 and S2 are rhythmic and regular. No murmurs appreciated.   Abdomen: Positive bowel sounds to auscultation. Benign to palpation. No masses felt. No hepatosplenomegaly.  Ostomy  Fistula   Extremities: No clubbing, no cyanosis, no edema.  Lines: Peripheral.  HL    Laboratory and Tests:  Lab Results   Component Value Date    CRP 4.0 2024    .0 (H) 2023 
  [facility]  [facility]  Progress Note    SUBJECTIVE:  Chief Complaint   Patient presents with    Abnormal Lab     Blood culture positive for RHIZOBIUM RADIOBACTER         Patient is tolerating medications. No reported adverse drug reactions.  No fevers     Reports that she is frustrated with everything going on, doesn't understand why she keeps getting infections and why no one is explaining anything to her - spent some time with her and explained the plan of care to the best of my ability - she verbalized understanding and seemed less frustrated.    Review of systems:  As stated above in the chief complaint, otherwise negative.    Medications:  Scheduled Meds:   sodium chloride flush  5-40 mL IntraVENous 2 times per day    apixaban  5 mg Oral BID    levothyroxine  75 mcg Oral Daily    pantoprazole  40 mg Oral QAM AC    cefepime  2,000 mg IntraVENous Q12H    traZODone  100 mg Oral Nightly    sertraline  50 mg Oral Daily    loperamide  2 mg Oral 4x Daily     Continuous Infusions:   dextrose 5% and 0.9% NaCl with KCl 20 mEq 100 mL/hr at 24 0436    sodium chloride       PRN Meds:diphenhydrAMINE-zinc acetate, ondansetron, sodium chloride flush, sodium chloride, polyethylene glycol, acetaminophen **OR** acetaminophen    OBJECTIVE:  BP (!) 98/57   Pulse 57   Temp 98.2 °F (36.8 °C) (Oral)   Resp 16   Ht 1.549 m (5' 1\")   Wt 61.7 kg (136 lb 0.4 oz)   SpO2 99%   BMI 25.70 kg/m²   Temp  Av.3 °F (36.8 °C)  Min: 98.2 °F (36.8 °C)  Max: 98.3 °F (36.8 °C)  Constitutional: The patient is awake, alert, and oriented. Sitting up in bed, in no distress. Significant other at bedside   Skin: Warm and dry. No rashes were noted.   HEENT: Round and reactive pupils.  Moist mucous membranes.  No ulcerations or thrush.  Neck: Supple to movements.   Chest: No use of accessory muscles to breathe. Symmetrical expansion.  No wheezing, crackles or rhonchi.  Cardiovascular: S1 and S2 are rhythmic 
4 Eyes Skin Assessment     NAME:  Maryam Santana  YOB: 1965  MEDICAL RECORD NUMBER:  [id number]    The patient is being assessed for  Admission    I agree that at least one RN has performed a thorough Head to Toe Skin Assessment on the patient. ALL assessment sites listed below have been assessed.      Areas assessed by both nurses:    Head, Face, Ears, Shoulders, Back, Chest, Arms, Elbows, Hands, Sacrum. Buttock, Coccyx, Ischium, Legs. Feet and Heels, Under Medical Devices , and Other ***        Does the Patient have a Wound? No noted wound(s)       Tato Prevention initiated by RN: Yes  Wound Care Orders initiated by RN: Yes    Pressure Injury (Stage 3,4, Unstageable, DTI, NWPT, and Complex wounds) if present, place Wound referral order by RN under : No    New Ostomies, if present place, Ostomy referral order under : Yes     Nurse 1 eSignature: Electronically signed by Lizzette Ferreira RN on 3/8/24 at 6:45 AM EST    **SHARE this note so that the co-signing nurse can place an eSignature**    Nurse 2 eSignature: {Esignature:[id number]}   
Antibiotic Extended Infusion Policy     This patient is on medication that requires renal, weight, and/or indication dose adjustment.      Date Body Weight IBW  Adjusted BW SCr  CrCl Dialysis status BMI   3/8/2024 60.7 kg (133 lb 14.4 oz) Ideal body weight: 47.8 kg (105 lb 6.1 oz)  Adjusted ideal body weight: 53 kg (116 lb 12.6 oz) Serum creatinine: 1.3 mg/dL (H) 03/07/24 2347  Estimated creatinine clearance: 40 mL/min (A) N/a Body mass index is 25.3 kg/m².       Pharmacy has dose-adjusted the following medication(s):    Ordered Medication: Cefepime 2000mg q8h     Order Changed/converted to: Cefepime 2000mg q12h    These changes were made per protocol according to the [facility]   Automatic Extended Infusion Dose Adjustment Policy.     *Please note this dose may need readjusted if patient's condition changes.    Please contact pharmacy with any questions regarding these changes.    eliu tamez RPH  3/8/2024  8:52 AM    
Comprehensive Nutrition Assessment    Type and Reason for Visit:  Reassess    Nutrition Recommendations/Plan:   Continue current diet as tolerated & monitor    TPN Recommendation:    Custom 2-in-1 Central PN: 280 g Dextrose, 70 g AA in 1700 ml/d at 70.8 ml/hr with 250 ml Lipids 20% x 3/week  To provide: 1446 kcal (ave), 70 g AA       Malnutrition Assessment:  Malnutrition Status:  Moderate malnutrition (03/08/24 0835)    Context:  Chronic Illness     Findings of the 6 clinical characteristics of malnutrition:  Energy Intake:  Mild decrease in energy intake (Comment) (Curently NPO w/o PN and delay while new access obtained)  Weight Loss:  Greater than 20% over 1 year     Body Fat Loss:  No significant body fat loss     Muscle Mass Loss:  Mild muscle mass loss Clavicles (pectoralis & deltoids)  Fluid Accumulation:  No significant fluid accumulation     Strength:  Not Performed    Nutrition Assessment:    Pt had PICC replacement 3/11, will provide TPN recommendation & monitor. Discharge order noted    Nutrition Related Findings:    A&O, +2.9L, no edema, abd soft/rounded, +BS, ileostomy, dex IVF   Wound Type:  (EC fistula)       Current Nutrition Intake & Therapies:    Average Meal Intake:  (0-600 ml)  Average Supplements Intake: None Ordered  ADULT DIET; Clear Liquid    Anthropometric Measures:  Height: 154.9 cm (5' 1\")  Ideal Body Weight (IBW): 105 lbs (48 kg)    Admission Body Weight: 60.7 kg (133 lb 14.4 oz) (3/8 bedscale)  Current Body Weight: 63.3 kg (139 lb 8.8 oz) (3/12 elevated (+I&Os)), 127.5 % IBW. Weight Source: Bed Scale  Current BMI (kg/m2): 26.4  Usual Body Weight: 76.2 kg (167 lb 15.9 oz) (actual at [facility] x 1 yr ago (Feb 2023))  % Weight Change (Calculated): -20.3                    BMI Categories: Overweight (BMI 25.0-29.9)    Estimated Daily Nutrient Needs:  Energy Requirements Based On: Formula  Weight Used for Energy Requirements: Admission  Energy (kcal/day):   Weight Used for Protein 
Consulted to assess newly paced PICC to LUE due to bleeding at insertion site. Patient has no complaints regarding PICC other than bleeding from the insertion site. Site noted to have some bruising around insertion site and a trickle of blood has saturated the dressing and started to leak from the dressing. Using sterile technique the PICC dressing was changed and stat seal applied to prevent further bleeding. During dressing change it appeared that the bleeding had stopped. Site is otherwise asymptomatic, no other issues observed. Patient's RN aware of above interventions.  Electronically signed by Clarisa Cabrera RN on 3/11/2024 at 7:35 PM    
Consulted to place new PIV for scheduled IV antibiotics and continuous IV fluids. Patient currently declining PIV placement, she is very tearful, she states that she is tired of being poked and tired of dealing with the stress of her illness. She is requesting updates on her test results and medical plan. This nurse offered to come back later for PIV placement and patient agreed. Patient's RN aware of above.  Electronically signed by Clarisa Cabrera RN on 3/10/2024 at 7:34 PM    
ET Nurse: consulted for fistula and colostomy. Patient is independent, has supplies with her, denies any needs.   
Nephrology Progress Note  3/11/2024 2:58 PM  Subjective:   Admit Date: 3/7/2024  PCP: Matthew Russell MD    Interval History: Pt being seen for NEGRO    3/11/24: Pt somnolent, family at beside, no new issues    Diet: ADULT DIET; Clear Liquid    Data:     Scheduled Meds:   sodium chloride flush  5-40 mL IntraVENous 2 times per day    heparin flush  3 mL IntraVENous 2 times per day    apixaban  5 mg Oral BID    levothyroxine  75 mcg Oral Daily    pantoprazole  40 mg Oral QAM AC    cefepime  2,000 mg IntraVENous Q12H    traZODone  100 mg Oral Nightly    sertraline  50 mg Oral Daily    loperamide  2 mg Oral 4x Daily     Continuous Infusions:   sodium chloride      dextrose 5% and 0.9% NaCl with KCl 20 mEq 100 mL/hr at 03/11/24 0436     PRN Meds:sodium chloride flush, sodium chloride, heparin flush, diphenhydrAMINE-zinc acetate, ondansetron, polyethylene glycol, acetaminophen **OR** acetaminophen  I/O last 3 completed shifts:  In: 1080 [P.O.:1080]  Out: -   No intake/output data recorded.    Intake/Output Summary (Last 24 hours) at 3/11/2024 1458  Last data filed at 3/10/2024 2046  Gross per 24 hour   Intake 240 ml   Output --   Net 240 ml     CBC:   Recent Labs     03/09/24  0438 03/10/24  0615 03/11/24  0325   WBC 5.7 5.0 5.2   HGB 14.1 12.4 11.2*    199 168     BMP:    Recent Labs     03/09/24  1650 03/10/24  0615 03/11/24  0325    136 135   K 4.2 4.1 3.5    106 107   CO2 24 21* 20*   BUN 22* 17 10   CREATININE 1.0 1.0 1.0   GLUCOSE 94 94 83     Hepatic:   Recent Labs     03/10/24  0615 03/11/24  0325   AST 38* 28   ALT 21 19   BILITOT 0.4 0.4   ALKPHOS 126* 117*     Protein/ Albumin:    Lab Results   Component Value Date    LABALBU 3.3 (L) 03/11/2024     Lab Results   Component Value Date    CREATININE 1.0 03/11/2024    CREATININE 1.0 03/10/2024    CREATININE 1.0 03/09/2024    CREATININE 1.1 (H) 03/09/2024    CREATININE 1.1 (H) 03/08/2024    CREATININE 1.3 (H) 03/07/2024    CREATININE 0.8 
Nephrology Progress Note  3/9/2024 3:33 PM  Subjective:   Admit Date: 3/7/2024  PCP: Matthew Russell MD    Interval History: Not on TPN anymore because of removal of PICC line.  Not eating or drinking much.  Tolerating IV fluids.  No pain.  Not short of breath and not on oxygen.    Diet: ADULT DIET; Clear Liquid    Data:     Scheduled Meds:   potassium chloride  40 mEq Oral Once    sodium chloride flush  5-40 mL IntraVENous 2 times per day    apixaban  5 mg Oral BID    levothyroxine  75 mcg Oral Daily    pantoprazole  40 mg Oral QAM AC    cefepime  2,000 mg IntraVENous Q12H    traZODone  100 mg Oral Nightly    sertraline  50 mg Oral Daily    loperamide  2 mg Oral 4x Daily     Continuous Infusions:   dextrose 5 % and 0.9 % NaCl 75 mL/hr at 03/09/24 1127    sodium chloride       PRN Meds:diphenhydrAMINE-zinc acetate, ondansetron, sodium chloride flush, sodium chloride, polyethylene glycol, acetaminophen **OR** acetaminophen  I/O last 3 completed shifts:  In: 0   Out: 150 [Urine:150]  I/O this shift:  In: 480 [P.O.:480]  Out: -     Intake/Output Summary (Last 24 hours) at 3/9/2024 1533  Last data filed at 3/9/2024 1200  Gross per 24 hour   Intake 480 ml   Output 150 ml   Net 330 ml     CBC:   Recent Labs     03/07/24 2347 03/09/24  0438   WBC 9.0 5.7   HGB 14.4 14.1    263     BMP:    Recent Labs     03/07/24  2347 03/08/24  1210 03/09/24  0438    134 132   K 4.1 3.9 2.9*   CL 92* 96* 97*   CO2 27 25 24   BUN 39* 35* 29*   CREATININE 1.3* 1.1* 1.1*   GLUCOSE 101* 93 91     Hepatic:   Recent Labs     03/07/24 2347 03/08/24  1210   AST 38* 35*   ALT 25 22   BILITOT 0.4 0.5   ALKPHOS 173* 155*     Protein/ Albumin:    Lab Results   Component Value Date    LABALBU 3.9 03/08/2024     Lab Results   Component Value Date    CREATININE 1.1 (H) 03/09/2024    CREATININE 1.1 (H) 03/08/2024    CREATININE 1.3 (H) 03/07/2024    CREATININE 0.8 03/04/2024    CREATININE 0.8 02/26/2024    CREATININE 0.7 02/19/2024    
Notified Juan Phillips NP of patients morning BP of 76/46 manually, HR of 53, and experiencing lethargy. See new orders.   
Pt seen and examined by night physician who admitted pt earlier today.    Chart reviewed and updated by nursing.   
We are aware of this patient having \"positive blood culture\" from outside facility. Pt states new sets done on admission. May I suggest holding off on pulling line to see if the previous results were possibly contaminated? Suggest ID consult to review, this pt is chronic TPN use for the foreseeable future per pt. Would suggest assessing for port reinsertion, as pt vasculature not amendable to much further PIV access. This current PICC inserted 1/4/24 is functioning very well, CXR recommends adjusting line out approx 4cm, which is very easy to do with next dressing change, and both ports have excellent blood return.  Floor nurse aware.   
102.0 (H) 12/28/2023    CRP 0.9 (H) 09/29/2015     Lab Results   Component Value Date    SEDRATE 37 (H) 03/08/2024    SEDRATE 18 12/28/2023    SEDRATE 11 09/29/2015       Radiology:      Microbiology:   3/8/2024 blood culture coming back today three 3/9 variable rods.  PICC tip culture sent      Assessment:  Rhizobium radiobacter bacteremia  CLABSI associated to the above  Enterocutaneous fistula, on TPN      Plan:    Continue Cefepime - planning 10 days total  Remove PICC line - culture tip - sent 3/8  Check cultures, baseline ESR, CRP  Monitor labs  Will follow with you    SANJAY Ruffin - CNS  12:26 PM  3/9/2024  
39cm    Laboratory and Tests:  Lab Results   Component Value Date    CRP 4.0 03/08/2024    .0 (H) 12/28/2023    CRP 0.9 (H) 09/29/2015     Lab Results   Component Value Date    SEDRATE 37 (H) 03/08/2024    SEDRATE 18 12/28/2023    SEDRATE 11 09/29/2015       Radiology:  Reviewed     Microbiology:   Blood cultures 3/1 (outpatient) : Rhizobium Radiobacter  Blood cultures 3/8/24: GVR; Rhizobium Radiobacter  Blood cultures 3/9: negative so far  PICC tip cx: no growth     Assessment:  Rhizobium radiobacter bacteremia  CLABSI associated to the above  Enterocutaneous fistula, on TPN      Plan:    Continue Cefepime - day 5 of minimum 10   PICC in place for IV antibiotics  Med rec complete from ID standpoint  Ok to discharge from ID standpoint - wants to follow up with surgeon for possible placement of mediport for TPN     SANJAY Pierre - CNP  12:29 PM  3/12/2024    Patient seen and examined. I had a face to face encounter with the patient. Agree with exam.  Assessment and plan as outlined above and directed by me. Addition and corrections were done as deemed appropriate. My exam and plan include: Patient is doing well.  Tolerating antibiotics.  She will be discharged on 6 more days of antibiotics.  Instructed to call her surgeon so she can get a Mediport placed.  She can keep that PICC until then but I asked her to do it as soon as possible so she does not come back with a CLABSI.    Cody Rizvi MD  3/12/2024  1:55 PM    
CHEST PORTABLE   Final Result   1. The new left PICC line has its tip at the superior right atrium.   Withdrawing the catheter 4 cm would place the tip at the cavoatrial junction.   2. No acute cardiopulmonary disease.             Assessment:    Principal Problem:    Bacteremia  Active Problems:    Crohn's disease (HCC)    History of colon cancer    Enterocutaneous fistula    ALFREDO (acute kidney injury) (HCC)    Type 2 diabetes mellitus, without long-term current use of insulin (HCC)    Moderate protein-calorie malnutrition (HCC)    Hypothyroidism    Controlled type 2 diabetes mellitus without complication (HCC)  Resolved Problems:    * No resolved hospital problems. *      Plan:  Bacteremia continue abx ID following  CLABSI initial picc removed.  continue abx  Enterocutaneous fistula hold tpn with bacteremia  Moderate protein calorie malnutrition tpn on hold pt has dextrose on fluids  Alfredo improving monitor  Dm type 2 controlled monitor  Hypothyroidism continue med  Hypokalemia monitor and replace prn  Depression continue med    Pt had picc today. Will continue iv dextrose overnight and discharge pt tomorrow. Pt can restart tpn at home.     Electronically signed by Saqib Cash, DO on 3/11/2024 at 6:27 PM    
PORTABLE   Final Result   1. The new left PICC line has its tip at the superior right atrium.   Withdrawing the catheter 4 cm would place the tip at the cavoatrial junction.   2. No acute cardiopulmonary disease.             Assessment:    Principal Problem:    Bacteremia  Active Problems:    Crohn's disease (HCC)    History of colon cancer    Enterocutaneous fistula    ALFREDO (acute kidney injury) (HCC)    Type 2 diabetes mellitus, without long-term current use of insulin (HCC)    Moderate protein-calorie malnutrition (HCC)    Hypothyroidism    Controlled type 2 diabetes mellitus without complication (HCC)  Resolved Problems:    * No resolved hospital problems. *      Plan:  Bacteremia continue abx ID following  CLABSI initial picc removed. Will need new picc continue abx  Enterocutaneous fistula hold tpn with bacteremia  Moderate protein calorie malnutrition tpn on hold pt has dextrose on fluids  Alfredo improving monitor  Dm type 2 controlled monitor  Hypothyroidism continue med  Hypokalemia monitor and replace prn  Depression continue med    Pt to have new picc placed. Pt states nausea better today. Continue to hold tpn    Electronically signed by Saqib Cash,  on 3/10/2024 at 5:18 PM    
Prerenal.  Improved.  Undetectable urine sodium.  [place] urine.  No obstruction.  Continue IV fluids.  Off TPN await restarting.  Not eating much.    Hypokalemia: Resolved after supplementation.    Normal gap metabolic acidosis: Possibly reflecting GI losses from colostomy.  If worse, add bicarbonate supplementation.          This note was created using voice recognition software.    Electronically signed by Bruno Gupta MD on 3/10/2024 at 1:35 PM

## 2024-03-12 NOTE — CARE COORDINATION
3/12/2024  Social Work Discharge Planning:Home today with [facility] to assist with IV ATB education and continues TPN needs that Pt manages and goes to [facility] out pt for PICC needs. [facility] other will transport at discharge. SHWETA order for TPN is needed.Leyla with [facility] will be here this morning to meet with Pt. Electronically signed by MENG Bernal on 3/12/2024 at 10:09 AM

## 2024-03-12 NOTE — DISCHARGE SUMMARY
[facility] Hospitalist       Hospitalist Physician Discharge Summary       No follow-up provider specified.    Activity level: as pool    Diet: ADULT DIET; Clear Liquid    Dispo:home    Condition at discharge: fair        Patient ID:  Maryam Santana  [id number]  59 y.o.  1965    Admit date: 3/7/2024    Discharge date and time:  3/12/2024  5:53 PM    Admission Diagnoses: Principal Problem:    Bacteremia  Active Problems:    Crohn's disease (HCC)    History of colon cancer    Enterocutaneous fistula    NEGRO (acute kidney injury) (HCC)    Type 2 diabetes mellitus, without long-term current use of insulin (HCC)    Moderate protein-calorie malnutrition (HCC)    Hypothyroidism    Controlled type 2 diabetes mellitus without complication (HCC)  Resolved Problems:    * No resolved hospital problems. *      Discharge Diagnoses: Principal Problem:    Bacteremia  Active Problems:    Crohn's disease (HCC)    History of colon cancer    Enterocutaneous fistula    NEGRO (acute kidney injury) (HCC)    Type 2 diabetes mellitus, without long-term current use of insulin (HCC)    Moderate protein-calorie malnutrition (HCC)    Hypothyroidism    Controlled type 2 diabetes mellitus without complication (HCC)  Resolved Problems:    * No resolved hospital problems. *    Bacteremia  CLABSI  Enterocutaneous fistula  Moderate protein calorie malnutrition  [place]ro  Dm type 2 controlled  Hypothyroidism  Hypokalemia  depression        Consults:  IP CONSULT TO INFECTIOUS DISEASES  IP CONSULT TO DIETITIAN  IP CONSULT TO NEPHROLOGY  IP CONSULT TO IV TEAM  IP CONSULT TO IV TEAM  IP CONSULT TO IV TEAM  IP CONSULT TO IV TEAM  IP CONSULT TO IV TEAM  IP CONSULT TO IV TEAM    Procedures: none    Hospital Course: Patient was admitted with Bacteremia [R78.81]  Positive blood culture [R78.81]. Patient is a 59-year-old female with a past medical history of colon cancer, Crohn's disease, enterocutaneous fistula, and hypertension who presents to the ED

## 2024-03-14 LAB
MICROORGANISM SPEC CULT: NORMAL
MICROORGANISM SPEC CULT: NORMAL
SERVICE CMNT-IMP: NORMAL
SERVICE CMNT-IMP: NORMAL
SPECIMEN DESCRIPTION: NORMAL
SPECIMEN DESCRIPTION: NORMAL

## 2024-03-15 LAB
ABSOLUTE IMMATURE GRANULOCYTE: <0.03 K/UL (ref 0–0.58)
ALBUMIN SERPL-MCNC: 4 G/DL (ref 3.5–5.2)
ALP BLD-CCNC: 114 U/L (ref 35–104)
ALT SERPL-CCNC: 15 U/L (ref 0–32)
ANION GAP SERPL CALCULATED.3IONS-SCNC: 13 MMOL/L (ref 7–16)
AST SERPL-CCNC: 19 U/L (ref 0–31)
BASOPHILS ABSOLUTE: 0.03 K/UL (ref 0–0.2)
BASOPHILS RELATIVE PERCENT: 0 % (ref 0–2)
BILIRUB SERPL-MCNC: 0.4 MG/DL (ref 0–1.2)
BUN BLDV-MCNC: 24 MG/DL (ref 6–20)
CALCIUM SERPL-MCNC: 9.3 MG/DL (ref 8.6–10.2)
CHLORIDE BLD-SCNC: 101 MMOL/L (ref 98–107)
CO2: 20 MMOL/L (ref 22–29)
CREAT SERPL-MCNC: 0.8 MG/DL (ref 0.5–1)
EOSINOPHILS ABSOLUTE: 0.36 K/UL (ref 0.05–0.5)
EOSINOPHILS RELATIVE PERCENT: 5 % (ref 0–6)
GFR SERPL CREATININE-BSD FRML MDRD: >60 ML/MIN/1.73M2
GLUCOSE BLD-MCNC: 117 MG/DL (ref 74–99)
HCT VFR BLD CALC: 38.8 % (ref 34–48)
HEMOGLOBIN: 12.9 G/DL (ref 11.5–15.5)
IMMATURE GRANULOCYTES: 0 % (ref 0–5)
LYMPHOCYTES ABSOLUTE: 2.42 K/UL (ref 1.5–4)
LYMPHOCYTES RELATIVE PERCENT: 31 % (ref 20–42)
MAGNESIUM: 1.7 MG/DL (ref 1.6–2.6)
MCH RBC QN AUTO: 31.8 PG (ref 26–35)
MCHC RBC AUTO-ENTMCNC: 33.2 G/DL (ref 32–34.5)
MCV RBC AUTO: 95.6 FL (ref 80–99.9)
MONOCYTES ABSOLUTE: 0.44 K/UL (ref 0.1–0.95)
MONOCYTES RELATIVE PERCENT: 6 % (ref 2–12)
NEUTROPHILS ABSOLUTE: 4.56 K/UL (ref 1.8–7.3)
NEUTROPHILS RELATIVE PERCENT: 58 % (ref 43–80)
PDW BLD-RTO: 12.8 % (ref 11.5–15)
PHOSPHORUS: 3.2 MG/DL (ref 2.5–4.5)
PLATELET # BLD: 229 K/UL (ref 130–450)
PMV BLD AUTO: 10.6 FL (ref 7–12)
POTASSIUM SERPL-SCNC: 4.1 MMOL/L (ref 3.5–5)
RBC # BLD: 4.06 M/UL (ref 3.5–5.5)
SODIUM BLD-SCNC: 134 MMOL/L (ref 132–146)
TOTAL PROTEIN: 7.3 G/DL (ref 6.4–8.3)
TRIGL SERPL-MCNC: 100 MG/DL
WBC # BLD: 7.8 K/UL (ref 4.5–11.5)

## 2024-03-18 LAB
ABSOLUTE IMMATURE GRANULOCYTE: <0.03 K/UL (ref 0–0.58)
ALBUMIN SERPL-MCNC: 4.2 G/DL (ref 3.5–5.2)
ALP BLD-CCNC: 131 U/L (ref 35–104)
ALT SERPL-CCNC: 16 U/L (ref 0–32)
ANION GAP SERPL CALCULATED.3IONS-SCNC: 13 MMOL/L (ref 7–16)
AST SERPL-CCNC: 26 U/L (ref 0–31)
BASOPHILS ABSOLUTE: 0.04 K/UL (ref 0–0.2)
BASOPHILS RELATIVE PERCENT: 1 % (ref 0–2)
BILIRUB SERPL-MCNC: 0.3 MG/DL (ref 0–1.2)
BUN BLDV-MCNC: 38 MG/DL (ref 6–20)
CALCIUM SERPL-MCNC: 10.2 MG/DL (ref 8.6–10.2)
CHLORIDE BLD-SCNC: 97 MMOL/L (ref 98–107)
CO2: 23 MMOL/L (ref 22–29)
CREAT SERPL-MCNC: 0.8 MG/DL (ref 0.5–1)
EOSINOPHILS ABSOLUTE: 0.24 K/UL (ref 0.05–0.5)
EOSINOPHILS RELATIVE PERCENT: 3 % (ref 0–6)
GFR SERPL CREATININE-BSD FRML MDRD: >60 ML/MIN/1.73M2
GLUCOSE BLD-MCNC: 115 MG/DL (ref 74–99)
HCT VFR BLD CALC: 41.9 % (ref 34–48)
HEMOGLOBIN: 14.2 G/DL (ref 11.5–15.5)
IMMATURE GRANULOCYTES: 0 % (ref 0–5)
LYMPHOCYTES ABSOLUTE: 2.36 K/UL (ref 1.5–4)
LYMPHOCYTES RELATIVE PERCENT: 31 % (ref 20–42)
MAGNESIUM: 2 MG/DL (ref 1.6–2.6)
MCH RBC QN AUTO: 31.8 PG (ref 26–35)
MCHC RBC AUTO-ENTMCNC: 33.9 G/DL (ref 32–34.5)
MCV RBC AUTO: 93.9 FL (ref 80–99.9)
MONOCYTES ABSOLUTE: 0.57 K/UL (ref 0.1–0.95)
MONOCYTES RELATIVE PERCENT: 7 % (ref 2–12)
NEUTROPHILS ABSOLUTE: 4.49 K/UL (ref 1.8–7.3)
NEUTROPHILS RELATIVE PERCENT: 58 % (ref 43–80)
PDW BLD-RTO: 12.9 % (ref 11.5–15)
PHOSPHORUS: 3.1 MG/DL (ref 2.5–4.5)
PLATELET # BLD: 239 K/UL (ref 130–450)
PMV BLD AUTO: 10.8 FL (ref 7–12)
POTASSIUM SERPL-SCNC: 4.8 MMOL/L (ref 3.5–5)
RBC # BLD: 4.46 M/UL (ref 3.5–5.5)
SODIUM BLD-SCNC: 133 MMOL/L (ref 132–146)
TOTAL PROTEIN: 8 G/DL (ref 6.4–8.3)
WBC # BLD: 7.7 K/UL (ref 4.5–11.5)

## 2024-03-19 LAB — TRIGL SERPL-MCNC: 155 MG/DL

## 2024-03-25 LAB
ABSOLUTE IMMATURE GRANULOCYTE: 0.04 K/UL (ref 0–0.58)
ALBUMIN SERPL-MCNC: 4.1 G/DL (ref 3.5–5.2)
ALP BLD-CCNC: 113 U/L (ref 35–104)
ALT SERPL-CCNC: 21 U/L (ref 0–32)
ANION GAP SERPL CALCULATED.3IONS-SCNC: 12 MMOL/L (ref 7–16)
AST SERPL-CCNC: 25 U/L (ref 0–31)
BASOPHILS ABSOLUTE: 0.03 K/UL (ref 0–0.2)
BASOPHILS RELATIVE PERCENT: 0 % (ref 0–2)
BILIRUB SERPL-MCNC: 0.2 MG/DL (ref 0–1.2)
BUN BLDV-MCNC: 33 MG/DL (ref 6–20)
CALCIUM SERPL-MCNC: 9.9 MG/DL (ref 8.6–10.2)
CHLORIDE BLD-SCNC: 96 MMOL/L (ref 98–107)
CO2: 26 MMOL/L (ref 22–29)
CREAT SERPL-MCNC: 0.8 MG/DL (ref 0.5–1)
EOSINOPHILS ABSOLUTE: 0.22 K/UL (ref 0.05–0.5)
EOSINOPHILS RELATIVE PERCENT: 2 % (ref 0–6)
GFR SERPL CREATININE-BSD FRML MDRD: 83 ML/MIN/1.73M2
GLUCOSE BLD-MCNC: 118 MG/DL (ref 74–99)
HCT VFR BLD CALC: 40.1 % (ref 34–48)
HEMOGLOBIN: 13.7 G/DL (ref 11.5–15.5)
IMMATURE GRANULOCYTES: 0 % (ref 0–5)
LYMPHOCYTES ABSOLUTE: 1.99 K/UL (ref 1.5–4)
LYMPHOCYTES RELATIVE PERCENT: 20 % (ref 20–42)
MAGNESIUM: 1.9 MG/DL (ref 1.6–2.6)
MCH RBC QN AUTO: 32.2 PG (ref 26–35)
MCHC RBC AUTO-ENTMCNC: 34.2 G/DL (ref 32–34.5)
MCV RBC AUTO: 94.4 FL (ref 80–99.9)
MONOCYTES ABSOLUTE: 0.57 K/UL (ref 0.1–0.95)
MONOCYTES RELATIVE PERCENT: 6 % (ref 2–12)
NEUTROPHILS ABSOLUTE: 7.03 K/UL (ref 1.8–7.3)
NEUTROPHILS RELATIVE PERCENT: 71 % (ref 43–80)
PDW BLD-RTO: 13.1 % (ref 11.5–15)
PHOSPHORUS: 4.2 MG/DL (ref 2.5–4.5)
PLATELET # BLD: 228 K/UL (ref 130–450)
PMV BLD AUTO: 10.1 FL (ref 7–12)
POTASSIUM SERPL-SCNC: 4.6 MMOL/L (ref 3.5–5)
RBC # BLD: 4.25 M/UL (ref 3.5–5.5)
SODIUM BLD-SCNC: 134 MMOL/L (ref 132–146)
TOTAL PROTEIN: 7.6 G/DL (ref 6.4–8.3)
TRIGL SERPL-MCNC: 134 MG/DL
WBC # BLD: 9.9 K/UL (ref 4.5–11.5)

## 2024-04-01 LAB
ALBUMIN SERPL-MCNC: 4.5 G/DL (ref 3.5–5.2)
ALP BLD-CCNC: 132 U/L (ref 35–104)
ALT SERPL-CCNC: 22 U/L (ref 0–32)
ANION GAP SERPL CALCULATED.3IONS-SCNC: 16 MMOL/L (ref 7–16)
AST SERPL-CCNC: 36 U/L (ref 0–31)
BASOPHILS ABSOLUTE: 0.04 K/UL (ref 0–0.2)
BASOPHILS RELATIVE PERCENT: 1 % (ref 0–2)
BILIRUB SERPL-MCNC: 0.3 MG/DL (ref 0–1.2)
BUN BLDV-MCNC: 52 MG/DL (ref 6–20)
CALCIUM SERPL-MCNC: 10.5 MG/DL (ref 8.6–10.2)
CHLORIDE BLD-SCNC: 88 MMOL/L (ref 98–107)
CO2: 31 MMOL/L (ref 22–29)
CREAT SERPL-MCNC: 1 MG/DL (ref 0.5–1)
EOSINOPHILS ABSOLUTE: 0.27 K/UL (ref 0.05–0.5)
EOSINOPHILS RELATIVE PERCENT: 4 % (ref 0–6)
GFR SERPL CREATININE-BSD FRML MDRD: 63 ML/MIN/1.73M2
GLUCOSE BLD-MCNC: 162 MG/DL (ref 74–99)
HCT VFR BLD CALC: 43.4 % (ref 34–48)
HEMOGLOBIN: 14.8 G/DL (ref 11.5–15.5)
IMMATURE GRANULOCYTES %: 0 % (ref 0–5)
IMMATURE GRANULOCYTES ABSOLUTE: <0.03 K/UL (ref 0–0.58)
LYMPHOCYTES ABSOLUTE: 2.58 K/UL (ref 1.5–4)
LYMPHOCYTES RELATIVE PERCENT: 33 % (ref 20–42)
MAGNESIUM: 2 MG/DL (ref 1.6–2.6)
MCH RBC QN AUTO: 31.2 PG (ref 26–35)
MCHC RBC AUTO-ENTMCNC: 34.1 G/DL (ref 32–34.5)
MCV RBC AUTO: 91.6 FL (ref 80–99.9)
MONOCYTES ABSOLUTE: 0.56 K/UL (ref 0.1–0.95)
MONOCYTES RELATIVE PERCENT: 7 % (ref 2–12)
NEUTROPHILS ABSOLUTE: 4.3 K/UL (ref 1.8–7.3)
NEUTROPHILS RELATIVE PERCENT: 55 % (ref 43–80)
PDW BLD-RTO: 12.9 % (ref 11.5–15)
PHOSPHORUS: 3 MG/DL (ref 2.5–4.5)
PLATELET # BLD: 329 K/UL (ref 130–450)
PMV BLD AUTO: 9.7 FL (ref 7–12)
POTASSIUM SERPL-SCNC: 3.5 MMOL/L (ref 3.5–5)
RBC # BLD: 4.74 M/UL (ref 3.5–5.5)
SODIUM BLD-SCNC: 135 MMOL/L (ref 132–146)
TOTAL PROTEIN: 8.3 G/DL (ref 6.4–8.3)
TRIGL SERPL-MCNC: 153 MG/DL
WBC # BLD: 7.8 K/UL (ref 4.5–11.5)

## 2024-04-08 LAB
ALBUMIN SERPL-MCNC: 4.4 G/DL (ref 3.5–5.2)
ALP BLD-CCNC: 114 U/L (ref 35–104)
ALT SERPL-CCNC: 19 U/L (ref 0–32)
ANION GAP SERPL CALCULATED.3IONS-SCNC: 20 MMOL/L (ref 7–16)
AST SERPL-CCNC: 36 U/L (ref 0–31)
BASOPHILS ABSOLUTE: 0.02 K/UL (ref 0–0.2)
BASOPHILS RELATIVE PERCENT: 0 % (ref 0–2)
BILIRUB SERPL-MCNC: 0.3 MG/DL (ref 0–1.2)
BUN BLDV-MCNC: 98 MG/DL (ref 6–20)
CALCIUM SERPL-MCNC: 9.8 MG/DL (ref 8.6–10.2)
CHLORIDE BLD-SCNC: 84 MMOL/L (ref 98–107)
CO2: 26 MMOL/L (ref 22–29)
CREAT SERPL-MCNC: 2.2 MG/DL (ref 0.5–1)
EOSINOPHILS ABSOLUTE: 0.19 K/UL (ref 0.05–0.5)
EOSINOPHILS RELATIVE PERCENT: 2 % (ref 0–6)
GFR SERPL CREATININE-BSD FRML MDRD: 25 ML/MIN/1.73M2
GLUCOSE BLD-MCNC: 121 MG/DL (ref 74–99)
HCT VFR BLD CALC: 38.8 % (ref 34–48)
HEMOGLOBIN: 13.5 G/DL (ref 11.5–15.5)
IMMATURE GRANULOCYTES %: 0 % (ref 0–5)
IMMATURE GRANULOCYTES ABSOLUTE: 0.03 K/UL (ref 0–0.58)
LYMPHOCYTES ABSOLUTE: 2.57 K/UL (ref 1.5–4)
LYMPHOCYTES RELATIVE PERCENT: 31 % (ref 20–42)
MAGNESIUM: 2.5 MG/DL (ref 1.6–2.6)
MCH RBC QN AUTO: 30.6 PG (ref 26–35)
MCHC RBC AUTO-ENTMCNC: 34.8 G/DL (ref 32–34.5)
MCV RBC AUTO: 88 FL (ref 80–99.9)
MONOCYTES ABSOLUTE: 0.81 K/UL (ref 0.1–0.95)
MONOCYTES RELATIVE PERCENT: 10 % (ref 2–12)
NEUTROPHILS ABSOLUTE: 4.81 K/UL (ref 1.8–7.3)
NEUTROPHILS RELATIVE PERCENT: 57 % (ref 43–80)
PDW BLD-RTO: 12.1 % (ref 11.5–15)
PHOSPHORUS: 4 MG/DL (ref 2.5–4.5)
PLATELET # BLD: 282 K/UL (ref 130–450)
PMV BLD AUTO: 10.1 FL (ref 7–12)
POTASSIUM SERPL-SCNC: 3.6 MMOL/L (ref 3.5–5)
RBC # BLD: 4.41 M/UL (ref 3.5–5.5)
SODIUM BLD-SCNC: 130 MMOL/L (ref 132–146)
TOTAL PROTEIN: 8 G/DL (ref 6.4–8.3)
TRIGL SERPL-MCNC: 129 MG/DL
WBC # BLD: 8.4 K/UL (ref 4.5–11.5)

## 2024-04-22 LAB
BASOPHILS ABSOLUTE: 0.04 K/UL (ref 0–0.2)
BASOPHILS RELATIVE PERCENT: 1 % (ref 0–2)
EOSINOPHILS ABSOLUTE: 0.16 K/UL (ref 0.05–0.5)
EOSINOPHILS RELATIVE PERCENT: 2 % (ref 0–6)
HCT VFR BLD CALC: 41.2 % (ref 34–48)
HEMOGLOBIN: 13.7 G/DL (ref 11.5–15.5)
IMMATURE GRANULOCYTES %: 0 % (ref 0–5)
IMMATURE GRANULOCYTES ABSOLUTE: 0.03 K/UL (ref 0–0.58)
LYMPHOCYTES ABSOLUTE: 2.22 K/UL (ref 1.5–4)
LYMPHOCYTES RELATIVE PERCENT: 29 % (ref 20–42)
MCH RBC QN AUTO: 30.4 PG (ref 26–35)
MCHC RBC AUTO-ENTMCNC: 33.3 G/DL (ref 32–34.5)
MCV RBC AUTO: 91.4 FL (ref 80–99.9)
MONOCYTES ABSOLUTE: 0.47 K/UL (ref 0.1–0.95)
MONOCYTES RELATIVE PERCENT: 6 % (ref 2–12)
NEUTROPHILS ABSOLUTE: 4.81 K/UL (ref 1.8–7.3)
NEUTROPHILS RELATIVE PERCENT: 62 % (ref 43–80)
PDW BLD-RTO: 12.7 % (ref 11.5–15)
PLATELET # BLD: 276 K/UL (ref 130–450)
PMV BLD AUTO: 10 FL (ref 7–12)
RBC # BLD: 4.51 M/UL (ref 3.5–5.5)
WBC # BLD: 7.7 K/UL (ref 4.5–11.5)

## 2024-04-23 LAB
ALBUMIN: 4 G/DL (ref 3.5–5.2)
ALP BLD-CCNC: 130 U/L (ref 35–104)
ALT SERPL-CCNC: 22 U/L (ref 0–32)
ANION GAP SERPL CALCULATED.3IONS-SCNC: 15 MMOL/L (ref 7–16)
AST SERPL-CCNC: 31 U/L (ref 0–31)
BILIRUB SERPL-MCNC: 0.2 MG/DL (ref 0–1.2)
BUN BLDV-MCNC: 53 MG/DL (ref 6–20)
CALCIUM SERPL-MCNC: 9.9 MG/DL (ref 8.6–10.2)
CHLORIDE BLD-SCNC: 94 MMOL/L (ref 98–107)
CO2: 26 MMOL/L (ref 22–29)
CREAT SERPL-MCNC: 1.2 MG/DL (ref 0.5–1)
GFR SERPL CREATININE-BSD FRML MDRD: 56 ML/MIN/1.73M2
GLUCOSE BLD-MCNC: 115 MG/DL (ref 74–99)
MAGNESIUM: 2 MG/DL (ref 1.6–2.6)
PHOSPHORUS: 2.8 MG/DL (ref 2.5–4.5)
POTASSIUM SERPL-SCNC: 4.1 MMOL/L (ref 3.5–5)
SODIUM BLD-SCNC: 135 MMOL/L (ref 132–146)
TOTAL PROTEIN: 7.7 G/DL (ref 6.4–8.3)
TRIGL SERPL-MCNC: 170 MG/DL

## 2024-04-30 LAB
ALBUMIN: 4.1 G/DL (ref 3.5–5.2)
ALP BLD-CCNC: 127 U/L (ref 35–104)
ALT SERPL-CCNC: 22 U/L (ref 0–32)
ANION GAP SERPL CALCULATED.3IONS-SCNC: 18 MMOL/L (ref 7–16)
AST SERPL-CCNC: 24 U/L (ref 0–31)
BASOPHILS ABSOLUTE: 0.04 K/UL (ref 0–0.2)
BASOPHILS RELATIVE PERCENT: 1 % (ref 0–2)
BILIRUB SERPL-MCNC: 0.4 MG/DL (ref 0–1.2)
BUN BLDV-MCNC: 22 MG/DL (ref 6–20)
CALCIUM SERPL-MCNC: 9.8 MG/DL (ref 8.6–10.2)
CHLORIDE BLD-SCNC: 103 MMOL/L (ref 98–107)
CO2: 16 MMOL/L (ref 22–29)
CREAT SERPL-MCNC: 1.1 MG/DL (ref 0.5–1)
EOSINOPHILS ABSOLUTE: 0.18 K/UL (ref 0.05–0.5)
EOSINOPHILS RELATIVE PERCENT: 2 % (ref 0–6)
GFR, ESTIMATED: 62 ML/MIN/1.73M2
GLUCOSE BLD-MCNC: 141 MG/DL (ref 74–99)
HCT VFR BLD CALC: 38.1 % (ref 34–48)
HEMOGLOBIN: 13.1 G/DL (ref 11.5–15.5)
IMMATURE GRANULOCYTES %: 0 % (ref 0–5)
IMMATURE GRANULOCYTES ABSOLUTE: 0.03 K/UL (ref 0–0.58)
LYMPHOCYTES ABSOLUTE: 2.28 K/UL (ref 1.5–4)
LYMPHOCYTES RELATIVE PERCENT: 30 % (ref 20–42)
MCH RBC QN AUTO: 31.3 PG (ref 26–35)
MCHC RBC AUTO-ENTMCNC: 34.4 G/DL (ref 32–34.5)
MCV RBC AUTO: 90.9 FL (ref 80–99.9)
MONOCYTES ABSOLUTE: 0.52 K/UL (ref 0.1–0.95)
MONOCYTES RELATIVE PERCENT: 7 % (ref 2–12)
NEUTROPHILS ABSOLUTE: 4.56 K/UL (ref 1.8–7.3)
NEUTROPHILS RELATIVE PERCENT: 60 % (ref 43–80)
PDW BLD-RTO: 13.2 % (ref 11.5–15)
PHOSPHORUS: 3 MG/DL (ref 2.5–4.5)
PLATELET # BLD: 265 K/UL (ref 130–450)
PMV BLD AUTO: 9.7 FL (ref 7–12)
POTASSIUM SERPL-SCNC: 3.6 MMOL/L (ref 3.5–5)
RBC # BLD: 4.19 M/UL (ref 3.5–5.5)
SODIUM BLD-SCNC: 137 MMOL/L (ref 132–146)
TOTAL PROTEIN: 7.5 G/DL (ref 6.4–8.3)
TRIGL SERPL-MCNC: 244 MG/DL
WBC # BLD: 7.6 K/UL (ref 4.5–11.5)

## 2024-05-01 LAB — MAGNESIUM: 1.4 MG/DL (ref 1.6–2.6)

## 2024-05-10 ENCOUNTER — OFFICE VISIT (OUTPATIENT)
Dept: SURGERY | Age: 59
End: 2024-05-10

## 2024-05-10 VITALS
SYSTOLIC BLOOD PRESSURE: 108 MMHG | BODY MASS INDEX: 24.55 KG/M2 | TEMPERATURE: 97.3 F | DIASTOLIC BLOOD PRESSURE: 60 MMHG | WEIGHT: 130 LBS | HEART RATE: 148 BPM | HEIGHT: 61 IN | RESPIRATION RATE: 18 BRPM

## 2024-05-10 DIAGNOSIS — K63.2 ENTEROCUTANEOUS FISTULA: ICD-10-CM

## 2024-05-10 DIAGNOSIS — K50.913 CROHN'S DISEASE WITH FISTULA, UNSPECIFIED GASTROINTESTINAL TRACT LOCATION (HCC): Primary | Chronic | ICD-10-CM

## 2024-05-10 DIAGNOSIS — E44.0 MODERATE PROTEIN-CALORIE MALNUTRITION (HCC): Chronic | ICD-10-CM

## 2024-05-10 DIAGNOSIS — Z85.038 HISTORY OF COLON CANCER: Chronic | ICD-10-CM

## 2024-05-10 ASSESSMENT — ENCOUNTER SYMPTOMS
BLOOD IN STOOL: 0
CHOKING: 0
ABDOMINAL PAIN: 0
COLOR CHANGE: 0
WHEEZING: 0
COUGH: 0
DIARRHEA: 0
STRIDOR: 0
VOMITING: 0
APNEA: 0
NAUSEA: 0
ANAL BLEEDING: 0
ABDOMINAL DISTENTION: 1
CHEST TIGHTNESS: 0
CONSTIPATION: 0
TROUBLE SWALLOWING: 0
SHORTNESS OF BREATH: 0

## 2024-05-10 NOTE — PROGRESS NOTES
[facility] Surgery Clinic Note    Chief Complaint   Patient presents with    Follow-up     Abnormal ct and states that she also had an MRI done. Having leakage        PCP: Matthew Russell MD    HPI: Maryam Santana is a 59 y.o. female who is here for follow up from an outpatient CT and MRI. She ius doing much better at home with her TPN and is drinking liquids. She states she has gone 4 days without any leakage from her fistula which is new for her. She was seeing GI which was concerned about a result on a CT about lesions in her liver along with inflammatory process in her midline scar. She had an MRI which showed the liver lesions NOT to be metastatic lesions but did not get far enough down on the midline to eval the entire incision. When she had her initial operation for the ovarian mass the path came back as benign on that. She had a history of colon cancer which she had a sigmoidectomy for in the past. Her last colonoscopy was march 2023 which was negative. She overall is doing much better. GI was concerned about restarting her Humira.     Review of Systems   Constitutional:  Positive for appetite change. Negative for activity change, chills, diaphoresis, fatigue, fever and unexpected weight change.   HENT:  Negative for trouble swallowing.    Respiratory:  Negative for apnea, cough, choking, chest tightness, shortness of breath, wheezing and stridor.    Cardiovascular:  Negative for chest pain.   Gastrointestinal:  Positive for abdominal distention. Negative for abdominal pain, anal bleeding, blood in stool, constipation, diarrhea, nausea and vomiting.   Musculoskeletal:  Positive for arthralgias, joint swelling and myalgias.   Skin:  Negative for color change, pallor and wound.   Neurological:  Negative for dizziness and light-headedness.   Psychiatric/Behavioral:  Negative for agitation and confusion.          Past Medical History:   Diagnosis Date    Cancer (HCC)     colon    Depression     Fissure,

## 2024-05-14 LAB
ALBUMIN: 3.6 G/DL (ref 3.5–5.2)
ALP BLD-CCNC: 128 U/L (ref 35–104)
ALT SERPL-CCNC: 17 U/L (ref 0–32)
ANION GAP SERPL CALCULATED.3IONS-SCNC: 11 MMOL/L (ref 7–16)
AST SERPL-CCNC: 21 U/L (ref 0–31)
BASOPHILS ABSOLUTE: 0.03 K/UL (ref 0–0.2)
BASOPHILS RELATIVE PERCENT: 0 % (ref 0–2)
BILIRUB SERPL-MCNC: 0.3 MG/DL (ref 0–1.2)
BUN BLDV-MCNC: 34 MG/DL (ref 6–20)
CALCIUM SERPL-MCNC: 9 MG/DL (ref 8.6–10.2)
CHLORIDE BLD-SCNC: 102 MMOL/L (ref 98–107)
CO2: 21 MMOL/L (ref 22–29)
CREAT SERPL-MCNC: 1 MG/DL (ref 0.5–1)
EOSINOPHILS ABSOLUTE: 0.15 K/UL (ref 0.05–0.5)
EOSINOPHILS RELATIVE PERCENT: 2 % (ref 0–6)
GFR, ESTIMATED: 63 ML/MIN/1.73M2
GLUCOSE BLD-MCNC: 117 MG/DL (ref 74–99)
HCT VFR BLD CALC: 35.3 % (ref 34–48)
HEMOGLOBIN: 12.1 G/DL (ref 11.5–15.5)
IMMATURE GRANULOCYTES %: 1 % (ref 0–5)
IMMATURE GRANULOCYTES ABSOLUTE: 0.06 K/UL (ref 0–0.58)
LYMPHOCYTES ABSOLUTE: 2.6 K/UL (ref 1.5–4)
LYMPHOCYTES RELATIVE PERCENT: 27 % (ref 20–42)
MAGNESIUM: 2 MG/DL (ref 1.6–2.6)
MCH RBC QN AUTO: 30.6 PG (ref 26–35)
MCHC RBC AUTO-ENTMCNC: 34.3 G/DL (ref 32–34.5)
MCV RBC AUTO: 89.1 FL (ref 80–99.9)
MONOCYTES ABSOLUTE: 0.83 K/UL (ref 0.1–0.95)
MONOCYTES RELATIVE PERCENT: 9 % (ref 2–12)
NEUTROPHILS ABSOLUTE: 5.94 K/UL (ref 1.8–7.3)
NEUTROPHILS RELATIVE PERCENT: 62 % (ref 43–80)
PDW BLD-RTO: 13.1 % (ref 11.5–15)
PHOSPHORUS: 2.1 MG/DL (ref 2.5–4.5)
PLATELET # BLD: 251 K/UL (ref 130–450)
PMV BLD AUTO: 10 FL (ref 7–12)
POTASSIUM SERPL-SCNC: 4.5 MMOL/L (ref 3.5–5)
RBC # BLD: 3.96 M/UL (ref 3.5–5.5)
SODIUM BLD-SCNC: 134 MMOL/L (ref 132–146)
TOTAL PROTEIN: 6.7 G/DL (ref 6.4–8.3)
WBC # BLD: 9.6 K/UL (ref 4.5–11.5)

## 2024-05-15 LAB — TRIGL SERPL-MCNC: 181 MG/DL

## 2024-05-30 LAB
ALBUMIN: 3.3 G/DL (ref 3.5–5.2)
ALP BLD-CCNC: 107 U/L (ref 35–104)
ALT SERPL-CCNC: 11 U/L (ref 0–32)
ANION GAP SERPL CALCULATED.3IONS-SCNC: 14 MMOL/L (ref 7–16)
AST SERPL-CCNC: 22 U/L (ref 0–31)
BASOPHILS ABSOLUTE: 0.03 K/UL (ref 0–0.2)
BASOPHILS RELATIVE PERCENT: 1 % (ref 0–2)
BILIRUB SERPL-MCNC: 0.4 MG/DL (ref 0–1.2)
BUN BLDV-MCNC: 15 MG/DL (ref 6–20)
CALCIUM SERPL-MCNC: 8.8 MG/DL (ref 8.6–10.2)
CHLORIDE BLD-SCNC: 103 MMOL/L (ref 98–107)
CO2: 21 MMOL/L (ref 22–29)
CREAT SERPL-MCNC: 1.1 MG/DL (ref 0.5–1)
EOSINOPHILS ABSOLUTE: 0.14 K/UL (ref 0.05–0.5)
EOSINOPHILS RELATIVE PERCENT: 2 % (ref 0–6)
GFR, ESTIMATED: 57 ML/MIN/1.73M2
GLUCOSE BLD-MCNC: 85 MG/DL (ref 74–99)
HCT VFR BLD CALC: 32 % (ref 34–48)
HEMOGLOBIN: 10.6 G/DL (ref 11.5–15.5)
IMMATURE GRANULOCYTES %: 0 % (ref 0–5)
IMMATURE GRANULOCYTES ABSOLUTE: <0.03 K/UL (ref 0–0.58)
LYMPHOCYTES ABSOLUTE: 1.85 K/UL (ref 1.5–4)
LYMPHOCYTES RELATIVE PERCENT: 28 % (ref 20–42)
MAGNESIUM: 1.6 MG/DL (ref 1.6–2.6)
MCH RBC QN AUTO: 30.5 PG (ref 26–35)
MCHC RBC AUTO-ENTMCNC: 33.1 G/DL (ref 32–34.5)
MCV RBC AUTO: 92 FL (ref 80–99.9)
MONOCYTES ABSOLUTE: 0.52 K/UL (ref 0.1–0.95)
MONOCYTES RELATIVE PERCENT: 8 % (ref 2–12)
NEUTROPHILS ABSOLUTE: 3.98 K/UL (ref 1.8–7.3)
NEUTROPHILS RELATIVE PERCENT: 61 % (ref 43–80)
PDW BLD-RTO: 14.5 % (ref 11.5–15)
PHOSPHORUS: 3.2 MG/DL (ref 2.5–4.5)
PLATELET # BLD: 229 K/UL (ref 130–450)
PMV BLD AUTO: 9.9 FL (ref 7–12)
POTASSIUM SERPL-SCNC: 3.6 MMOL/L (ref 3.5–5)
RBC # BLD: 3.48 M/UL (ref 3.5–5.5)
SODIUM BLD-SCNC: 138 MMOL/L (ref 132–146)
TOTAL PROTEIN: 6.4 G/DL (ref 6.4–8.3)
TRIGL SERPL-MCNC: 159 MG/DL
WBC # BLD: 6.5 K/UL (ref 4.5–11.5)

## 2024-06-10 LAB
ALBUMIN: 3.7 G/DL (ref 3.5–5.2)
ALP BLD-CCNC: 102 U/L (ref 35–104)
ALT SERPL-CCNC: 9 U/L (ref 0–32)
ANION GAP SERPL CALCULATED.3IONS-SCNC: 13 MMOL/L (ref 7–16)
AST SERPL-CCNC: 20 U/L (ref 0–31)
BASOPHILS ABSOLUTE: 0.03 K/UL (ref 0–0.2)
BASOPHILS RELATIVE PERCENT: 0 % (ref 0–2)
BILIRUB SERPL-MCNC: 0.3 MG/DL (ref 0–1.2)
CALCIUM SERPL-MCNC: 9.5 MG/DL (ref 8.6–10.2)
CHLORIDE BLD-SCNC: 100 MMOL/L (ref 98–107)
CO2: 24 MMOL/L (ref 22–29)
CREAT SERPL-MCNC: 1.1 MG/DL (ref 0.5–1)
EOSINOPHILS ABSOLUTE: 0.19 K/UL (ref 0.05–0.5)
EOSINOPHILS RELATIVE PERCENT: 3 % (ref 0–6)
GFR, ESTIMATED: 61 ML/MIN/1.73M2
GLUCOSE BLD-MCNC: 110 MG/DL (ref 74–99)
HCT VFR BLD CALC: 35 % (ref 34–48)
HEMOGLOBIN: 11.6 G/DL (ref 11.5–15.5)
IMMATURE GRANULOCYTES %: 1 % (ref 0–5)
IMMATURE GRANULOCYTES ABSOLUTE: 0.04 K/UL (ref 0–0.58)
LYMPHOCYTES ABSOLUTE: 2.34 K/UL (ref 1.5–4)
LYMPHOCYTES RELATIVE PERCENT: 33 % (ref 20–42)
MAGNESIUM: 1.8 MG/DL (ref 1.6–2.6)
MCH RBC QN AUTO: 30.4 PG (ref 26–35)
MCHC RBC AUTO-ENTMCNC: 33.1 G/DL (ref 32–34.5)
MCV RBC AUTO: 91.9 FL (ref 80–99.9)
MONOCYTES ABSOLUTE: 0.49 K/UL (ref 0.1–0.95)
MONOCYTES RELATIVE PERCENT: 7 % (ref 2–12)
NEUTROPHILS ABSOLUTE: 3.96 K/UL (ref 1.8–7.3)
NEUTROPHILS RELATIVE PERCENT: 56 % (ref 43–80)
PDW BLD-RTO: 14.6 % (ref 11.5–15)
PHOSPHORUS: 3.9 MG/DL (ref 2.5–4.5)
PLATELET # BLD: 298 K/UL (ref 130–450)
PMV BLD AUTO: 10 FL (ref 7–12)
POTASSIUM SERPL-SCNC: 3.7 MMOL/L (ref 3.5–5)
RBC # BLD: 3.81 M/UL (ref 3.5–5.5)
SODIUM BLD-SCNC: 137 MMOL/L (ref 132–146)
TOTAL PROTEIN: 7.3 G/DL (ref 6.4–8.3)
WBC # BLD: 7.1 K/UL (ref 4.5–11.5)

## 2024-06-11 LAB — TRIGL SERPL-MCNC: 180 MG/DL

## 2024-06-24 LAB
ALBUMIN: 3.5 G/DL (ref 3.5–5.2)
ALP BLD-CCNC: 111 U/L (ref 35–104)
ALT SERPL-CCNC: 9 U/L (ref 0–32)
ANION GAP SERPL CALCULATED.3IONS-SCNC: 12 MMOL/L (ref 7–16)
AST SERPL-CCNC: 18 U/L (ref 0–31)
BASOPHILS ABSOLUTE: 0.03 K/UL (ref 0–0.2)
BASOPHILS RELATIVE PERCENT: 0 % (ref 0–2)
BILIRUB SERPL-MCNC: 0.2 MG/DL (ref 0–1.2)
BUN BLDV-MCNC: 20 MG/DL (ref 6–20)
CALCIUM SERPL-MCNC: 9.2 MG/DL (ref 8.6–10.2)
CHLORIDE BLD-SCNC: 104 MMOL/L (ref 98–107)
CO2: 24 MMOL/L (ref 22–29)
CREAT SERPL-MCNC: 0.9 MG/DL (ref 0.5–1)
EOSINOPHILS ABSOLUTE: 0.27 K/UL (ref 0.05–0.5)
EOSINOPHILS RELATIVE PERCENT: 3 % (ref 0–6)
GFR, ESTIMATED: 73 ML/MIN/1.73M2
GLUCOSE BLD-MCNC: 91 MG/DL (ref 74–99)
HCT VFR BLD CALC: 34.2 % (ref 34–48)
HEMOGLOBIN: 11 G/DL (ref 11.5–15.5)
IMMATURE GRANULOCYTES %: 1 % (ref 0–5)
IMMATURE GRANULOCYTES ABSOLUTE: 0.05 K/UL (ref 0–0.58)
LYMPHOCYTES ABSOLUTE: 2.19 K/UL (ref 1.5–4)
LYMPHOCYTES RELATIVE PERCENT: 26 % (ref 20–42)
MAGNESIUM: 2.1 MG/DL (ref 1.6–2.6)
MCH RBC QN AUTO: 30.1 PG (ref 26–35)
MCHC RBC AUTO-ENTMCNC: 32.2 G/DL (ref 32–34.5)
MCV RBC AUTO: 93.7 FL (ref 80–99.9)
MONOCYTES ABSOLUTE: 0.57 K/UL (ref 0.1–0.95)
MONOCYTES RELATIVE PERCENT: 7 % (ref 2–12)
NEUTROPHILS ABSOLUTE: 5.3 K/UL (ref 1.8–7.3)
NEUTROPHILS RELATIVE PERCENT: 63 % (ref 43–80)
PDW BLD-RTO: 14.3 % (ref 11.5–15)
PHOSPHORUS: 3.4 MG/DL (ref 2.5–4.5)
PLATELET # BLD: 317 K/UL (ref 130–450)
PMV BLD AUTO: 9.7 FL (ref 7–12)
POTASSIUM SERPL-SCNC: 4.4 MMOL/L (ref 3.5–5)
RBC # BLD: 3.65 M/UL (ref 3.5–5.5)
SODIUM BLD-SCNC: 140 MMOL/L (ref 132–146)
TOTAL PROTEIN: 7.1 G/DL (ref 6.4–8.3)
TRIGL SERPL-MCNC: 133 MG/DL
WBC # BLD: 8.4 K/UL (ref 4.5–11.5)

## 2024-07-22 LAB
ALBUMIN SERPL-MCNC: 3.7 G/DL (ref 3.5–5.2)
ALP SERPL-CCNC: 102 U/L (ref 35–104)
ALT SERPL-CCNC: 13 U/L (ref 0–32)
ANION GAP SERPL CALCULATED.3IONS-SCNC: 15 MMOL/L (ref 7–16)
AST SERPL-CCNC: 23 U/L (ref 0–31)
BASOPHILS # BLD: 0.03 K/UL (ref 0–0.2)
BASOPHILS NFR BLD: 0 % (ref 0–2)
BILIRUB SERPL-MCNC: 0.2 MG/DL (ref 0–1.2)
BUN SERPL-MCNC: 21 MG/DL (ref 6–20)
CALCIUM SERPL-MCNC: 9.2 MG/DL (ref 8.6–10.2)
CHLORIDE SERPL-SCNC: 101 MMOL/L (ref 98–107)
CO2 SERPL-SCNC: 23 MMOL/L (ref 22–29)
CREAT SERPL-MCNC: 0.9 MG/DL (ref 0.5–1)
EOSINOPHIL # BLD: 0.15 K/UL (ref 0.05–0.5)
EOSINOPHILS RELATIVE PERCENT: 2 % (ref 0–6)
ERYTHROCYTE [DISTWIDTH] IN BLOOD BY AUTOMATED COUNT: 14.2 % (ref 11.5–15)
GFR, ESTIMATED: 74 ML/MIN/1.73M2
GLUCOSE SERPL-MCNC: 128 MG/DL (ref 74–99)
HCT VFR BLD AUTO: 36.7 % (ref 34–48)
HGB BLD-MCNC: 11.8 G/DL (ref 11.5–15.5)
IMM GRANULOCYTES # BLD AUTO: 0.03 K/UL (ref 0–0.58)
IMM GRANULOCYTES NFR BLD: 0 % (ref 0–5)
LYMPHOCYTES NFR BLD: 2.76 K/UL (ref 1.5–4)
LYMPHOCYTES RELATIVE PERCENT: 39 % (ref 20–42)
MAGNESIUM SERPL-MCNC: 1.9 MG/DL (ref 1.6–2.6)
MCH RBC QN AUTO: 29.6 PG (ref 26–35)
MCHC RBC AUTO-ENTMCNC: 32.2 G/DL (ref 32–34.5)
MCV RBC AUTO: 92 FL (ref 80–99.9)
MONOCYTES NFR BLD: 0.61 K/UL (ref 0.1–0.95)
MONOCYTES NFR BLD: 9 % (ref 2–12)
NEUTROPHILS NFR BLD: 50 % (ref 43–80)
NEUTS SEG NFR BLD: 3.52 K/UL (ref 1.8–7.3)
PHOSPHATE SERPL-MCNC: 2.4 MG/DL (ref 2.5–4.5)
PLATELET # BLD AUTO: 258 K/UL (ref 130–450)
PMV BLD AUTO: 9.9 FL (ref 7–12)
POTASSIUM SERPL-SCNC: 3.8 MMOL/L (ref 3.5–5)
PROT SERPL-MCNC: 7.3 G/DL (ref 6.4–8.3)
RBC # BLD AUTO: 3.99 M/UL (ref 3.5–5.5)
SODIUM SERPL-SCNC: 139 MMOL/L (ref 132–146)
TRIGL SERPL-MCNC: 140 MG/DL
WBC OTHER # BLD: 7.1 K/UL (ref 4.5–11.5)

## 2024-07-25 LAB
COPPER SERPL-MCNC: 117.5 UG/DL (ref 80–155)
CR SERPL-MCNC: 1 UG/L
MANGANESE SERPL-MCNC: <1 UG/L (ref 0–2)
SELENIUM SERPL-MCNC: 87.6 UG/L (ref 23–190)
ZINC SERPL-MCNC: 56.8 UG/DL (ref 60–120)

## 2024-07-31 LAB
ALBUMIN: 3.9 G/DL (ref 3.5–5.2)
ALP BLD-CCNC: 105 U/L (ref 35–104)
ALT SERPL-CCNC: 12 U/L (ref 0–32)
ANION GAP SERPL CALCULATED.3IONS-SCNC: 11 MMOL/L (ref 7–16)
AST SERPL-CCNC: 17 U/L (ref 0–31)
BASOPHILS ABSOLUTE: 0.02 K/UL (ref 0–0.2)
BASOPHILS RELATIVE PERCENT: 0 % (ref 0–2)
BILIRUB SERPL-MCNC: 0.2 MG/DL (ref 0–1.2)
BUN BLDV-MCNC: 23 MG/DL (ref 6–20)
C-REACTIVE PROTEIN: 3 MG/L (ref 0–5)
CALCIUM SERPL-MCNC: 9.5 MG/DL (ref 8.6–10.2)
CHLORIDE BLD-SCNC: 103 MMOL/L (ref 98–107)
CO2: 24 MMOL/L (ref 22–29)
CREAT SERPL-MCNC: 0.9 MG/DL (ref 0.5–1)
EOSINOPHILS ABSOLUTE: 0.14 K/UL (ref 0.05–0.5)
EOSINOPHILS RELATIVE PERCENT: 2 % (ref 0–6)
GFR, ESTIMATED: 75 ML/MIN/1.73M2
GLUCOSE BLD-MCNC: 110 MG/DL (ref 74–99)
HCT VFR BLD CALC: 38.2 % (ref 34–48)
HEMOGLOBIN: 12.2 G/DL (ref 11.5–15.5)
IMMATURE GRANULOCYTES %: 0 % (ref 0–5)
IMMATURE GRANULOCYTES ABSOLUTE: <0.03 K/UL (ref 0–0.58)
LYMPHOCYTES ABSOLUTE: 2.7 K/UL (ref 1.5–4)
LYMPHOCYTES RELATIVE PERCENT: 31 % (ref 20–42)
MAGNESIUM: 2 MG/DL (ref 1.6–2.6)
MCH RBC QN AUTO: 29.8 PG (ref 26–35)
MCHC RBC AUTO-ENTMCNC: 31.9 G/DL (ref 32–34.5)
MCV RBC AUTO: 93.2 FL (ref 80–99.9)
MONOCYTES ABSOLUTE: 0.62 K/UL (ref 0.1–0.95)
MONOCYTES RELATIVE PERCENT: 7 % (ref 2–12)
NEUTROPHILS ABSOLUTE: 5.14 K/UL (ref 1.8–7.3)
NEUTROPHILS RELATIVE PERCENT: 60 % (ref 43–80)
PDW BLD-RTO: 14.1 % (ref 11.5–15)
PHOSPHORUS: 2.9 MG/DL (ref 2.5–4.5)
PLATELET # BLD: 287 K/UL (ref 130–450)
PMV BLD AUTO: 9.2 FL (ref 7–12)
POTASSIUM SERPL-SCNC: 4.6 MMOL/L (ref 3.5–5)
RBC # BLD: 4.1 M/UL (ref 3.5–5.5)
SODIUM BLD-SCNC: 138 MMOL/L (ref 132–146)
TOTAL PROTEIN: 7.5 G/DL (ref 6.4–8.3)
TRIGL SERPL-MCNC: 224 MG/DL
VITAMIN D 25-HYDROXY: 28.1 NG/ML (ref 30–100)
WBC # BLD: 8.6 K/UL (ref 4.5–11.5)

## 2024-08-02 LAB
CHROMIUM, SERUM: 1.5 UG/L
COPPER: 99.5 UG/DL (ref 80–155)
MANGANESE, BLOOD: 12.4 UG/L (ref 4.2–16.5)
SELENIUM: 101.1 UG/L (ref 23–190)
ZINC: 53.4 UG/DL (ref 60–120)

## 2024-08-19 ENCOUNTER — APPOINTMENT (OUTPATIENT)
Dept: GENERAL RADIOLOGY | Age: 59
DRG: 981 | End: 2024-08-19
Payer: MEDICARE

## 2024-08-19 ENCOUNTER — HOSPITAL ENCOUNTER (INPATIENT)
Age: 59
LOS: 8 days | Discharge: HOME | DRG: 981 | End: 2024-08-28
Attending: EMERGENCY MEDICINE | Admitting: INTERNAL MEDICINE
Payer: MEDICARE

## 2024-08-19 ENCOUNTER — APPOINTMENT (OUTPATIENT)
Dept: CT IMAGING | Age: 59
DRG: 981 | End: 2024-08-19
Payer: MEDICARE

## 2024-08-19 DIAGNOSIS — A41.9 SEPSIS, DUE TO UNSPECIFIED ORGANISM, UNSPECIFIED WHETHER ACUTE ORGAN DYSFUNCTION PRESENT (HCC): Primary | ICD-10-CM

## 2024-08-19 DIAGNOSIS — G89.18 POST PROCEDURE DISCOMFORT: ICD-10-CM

## 2024-08-19 DIAGNOSIS — R78.81 BACTEREMIA: ICD-10-CM

## 2024-08-19 DIAGNOSIS — E87.6 HYPOKALEMIA: ICD-10-CM

## 2024-08-19 DIAGNOSIS — N17.9 AKI (ACUTE KIDNEY INJURY) (HCC): ICD-10-CM

## 2024-08-19 DIAGNOSIS — E87.20 LACTIC ACIDOSIS: ICD-10-CM

## 2024-08-19 LAB
ALBUMIN SERPL-MCNC: 3.8 G/DL (ref 3.5–5.2)
ALP SERPL-CCNC: 117 U/L (ref 35–104)
ALT SERPL-CCNC: 17 U/L (ref 0–32)
ANION GAP SERPL CALCULATED.3IONS-SCNC: 16 MMOL/L (ref 7–16)
AST SERPL-CCNC: 30 U/L (ref 0–31)
B PARAP IS1001 DNA NPH QL NAA+NON-PROBE: NOT DETECTED
B PERT DNA SPEC QL NAA+PROBE: NOT DETECTED
BASOPHILS # BLD: 0.03 K/UL (ref 0–0.2)
BASOPHILS NFR BLD: 0 % (ref 0–2)
BILIRUB DIRECT SERPL-MCNC: <0.2 MG/DL (ref 0–0.3)
BILIRUB INDIRECT SERPL-MCNC: ABNORMAL MG/DL (ref 0–1)
BILIRUB SERPL-MCNC: 0.6 MG/DL (ref 0–1.2)
BILIRUB UR QL STRIP: NEGATIVE
BUN SERPL-MCNC: 39 MG/DL (ref 6–20)
C PNEUM DNA NPH QL NAA+NON-PROBE: NOT DETECTED
CALCIUM SERPL-MCNC: 9.5 MG/DL (ref 8.6–10.2)
CHLORIDE SERPL-SCNC: 84 MMOL/L (ref 98–107)
CLARITY UR: CLEAR
CO2 SERPL-SCNC: 30 MMOL/L (ref 22–29)
COLOR UR: YELLOW
CREAT SERPL-MCNC: 1.6 MG/DL (ref 0.5–1)
EOSINOPHIL # BLD: 0.01 K/UL (ref 0.05–0.5)
EOSINOPHILS RELATIVE PERCENT: 0 % (ref 0–6)
ERYTHROCYTE [DISTWIDTH] IN BLOOD BY AUTOMATED COUNT: 13.6 % (ref 11.5–15)
FLUAV RNA NPH QL NAA+NON-PROBE: NOT DETECTED
FLUBV RNA NPH QL NAA+NON-PROBE: NOT DETECTED
GFR, ESTIMATED: 39 ML/MIN/1.73M2
GLUCOSE SERPL-MCNC: 127 MG/DL (ref 74–99)
GLUCOSE UR STRIP-MCNC: NEGATIVE MG/DL
HADV DNA NPH QL NAA+NON-PROBE: NOT DETECTED
HCOV 229E RNA NPH QL NAA+NON-PROBE: NOT DETECTED
HCOV HKU1 RNA NPH QL NAA+NON-PROBE: NOT DETECTED
HCOV NL63 RNA NPH QL NAA+NON-PROBE: NOT DETECTED
HCOV OC43 RNA NPH QL NAA+NON-PROBE: NOT DETECTED
HCT VFR BLD AUTO: 36.8 % (ref 34–48)
HGB BLD-MCNC: 12.5 G/DL (ref 11.5–15.5)
HGB UR QL STRIP.AUTO: NEGATIVE
HMPV RNA NPH QL NAA+NON-PROBE: NOT DETECTED
HPIV1 RNA NPH QL NAA+NON-PROBE: NOT DETECTED
HPIV2 RNA NPH QL NAA+NON-PROBE: NOT DETECTED
HPIV3 RNA NPH QL NAA+NON-PROBE: NOT DETECTED
HPIV4 RNA NPH QL NAA+NON-PROBE: NOT DETECTED
IMM GRANULOCYTES # BLD AUTO: 0.14 K/UL (ref 0–0.58)
IMM GRANULOCYTES NFR BLD: 1 % (ref 0–5)
INFLUENZA A BY PCR: NOT DETECTED
INFLUENZA B BY PCR: NOT DETECTED
KETONES UR STRIP-MCNC: NEGATIVE MG/DL
LACTATE BLDV-SCNC: 3.7 MMOL/L (ref 0.5–1.9)
LACTATE BLDV-SCNC: 5.3 MMOL/L (ref 0.5–1.9)
LEUKOCYTE ESTERASE UR QL STRIP: NEGATIVE
LIPASE SERPL-CCNC: 28 U/L (ref 13–60)
LYMPHOCYTES NFR BLD: 1.04 K/UL (ref 1.5–4)
LYMPHOCYTES RELATIVE PERCENT: 5 % (ref 20–42)
M PNEUMO DNA NPH QL NAA+NON-PROBE: NOT DETECTED
MCH RBC QN AUTO: 29.6 PG (ref 26–35)
MCHC RBC AUTO-ENTMCNC: 34 G/DL (ref 32–34.5)
MCV RBC AUTO: 87 FL (ref 80–99.9)
MONOCYTES NFR BLD: 1.22 K/UL (ref 0.1–0.95)
MONOCYTES NFR BLD: 6 % (ref 2–12)
NEUTROPHILS NFR BLD: 89 % (ref 43–80)
NEUTS SEG NFR BLD: 18.97 K/UL (ref 1.8–7.3)
NITRITE UR QL STRIP: NEGATIVE
PH UR STRIP: 7.5 [PH] (ref 5–9)
PLATELET # BLD AUTO: 245 K/UL (ref 130–450)
PMV BLD AUTO: 9.1 FL (ref 7–12)
POTASSIUM SERPL-SCNC: 2.9 MMOL/L (ref 3.5–5)
PROT SERPL-MCNC: 7.7 G/DL (ref 6.4–8.3)
PROT UR STRIP-MCNC: NEGATIVE MG/DL
RBC # BLD AUTO: 4.23 M/UL (ref 3.5–5.5)
RBC #/AREA URNS HPF: NORMAL /HPF
RSV RNA NPH QL NAA+NON-PROBE: NOT DETECTED
RV+EV RNA NPH QL NAA+NON-PROBE: NOT DETECTED
SARS-COV-2 RDRP RESP QL NAA+PROBE: NOT DETECTED
SARS-COV-2 RNA NPH QL NAA+NON-PROBE: NOT DETECTED
SODIUM SERPL-SCNC: 130 MMOL/L (ref 132–146)
SP GR UR STRIP: 1.01 (ref 1–1.03)
SPECIMEN DESCRIPTION: NORMAL
SPECIMEN DESCRIPTION: NORMAL
UROBILINOGEN UR STRIP-ACNC: 0.2 EU/DL (ref 0–1)
WBC #/AREA URNS HPF: NORMAL /HPF
WBC OTHER # BLD: 21.4 K/UL (ref 4.5–11.5)

## 2024-08-19 PROCEDURE — 82248 BILIRUBIN DIRECT: CPT

## 2024-08-19 PROCEDURE — G0378 HOSPITAL OBSERVATION PER HR: HCPCS

## 2024-08-19 PROCEDURE — 83605 ASSAY OF LACTIC ACID: CPT

## 2024-08-19 PROCEDURE — 6360000002 HC RX W HCPCS

## 2024-08-19 PROCEDURE — 87154 CUL TYP ID BLD PTHGN 6+ TRGT: CPT

## 2024-08-19 PROCEDURE — 2580000003 HC RX 258: Performed by: EMERGENCY MEDICINE

## 2024-08-19 PROCEDURE — 85025 COMPLETE CBC W/AUTO DIFF WBC: CPT

## 2024-08-19 PROCEDURE — 80053 COMPREHEN METABOLIC PANEL: CPT

## 2024-08-19 PROCEDURE — 74177 CT ABD & PELVIS W/CONTRAST: CPT

## 2024-08-19 PROCEDURE — 96365 THER/PROPH/DIAG IV INF INIT: CPT

## 2024-08-19 PROCEDURE — 96375 TX/PRO/DX INJ NEW DRUG ADDON: CPT

## 2024-08-19 PROCEDURE — 6370000000 HC RX 637 (ALT 250 FOR IP): Performed by: EMERGENCY MEDICINE

## 2024-08-19 PROCEDURE — 83690 ASSAY OF LIPASE: CPT

## 2024-08-19 PROCEDURE — 99285 EMERGENCY DEPT VISIT HI MDM: CPT

## 2024-08-19 PROCEDURE — 96366 THER/PROPH/DIAG IV INF ADDON: CPT

## 2024-08-19 PROCEDURE — 83993 ASSAY FOR CALPROTECTIN FECAL: CPT

## 2024-08-19 PROCEDURE — 81001 URINALYSIS AUTO W/SCOPE: CPT

## 2024-08-19 PROCEDURE — 0202U NFCT DS 22 TRGT SARS-COV-2: CPT

## 2024-08-19 PROCEDURE — 6360000004 HC RX CONTRAST MEDICATION: Performed by: RADIOLOGY

## 2024-08-19 PROCEDURE — 96368 THER/DIAG CONCURRENT INF: CPT

## 2024-08-19 PROCEDURE — 87077 CULTURE AEROBIC IDENTIFY: CPT

## 2024-08-19 PROCEDURE — 6370000000 HC RX 637 (ALT 250 FOR IP): Performed by: INTERNAL MEDICINE

## 2024-08-19 PROCEDURE — 71045 X-RAY EXAM CHEST 1 VIEW: CPT

## 2024-08-19 PROCEDURE — 87040 BLOOD CULTURE FOR BACTERIA: CPT

## 2024-08-19 PROCEDURE — 96361 HYDRATE IV INFUSION ADD-ON: CPT

## 2024-08-19 PROCEDURE — 6360000002 HC RX W HCPCS: Performed by: INTERNAL MEDICINE

## 2024-08-19 PROCEDURE — 6360000002 HC RX W HCPCS: Performed by: EMERGENCY MEDICINE

## 2024-08-19 PROCEDURE — 99223 1ST HOSP IP/OBS HIGH 75: CPT | Performed by: INTERNAL MEDICINE

## 2024-08-19 PROCEDURE — 6370000000 HC RX 637 (ALT 250 FOR IP): Performed by: STUDENT IN AN ORGANIZED HEALTH CARE EDUCATION/TRAINING PROGRAM

## 2024-08-19 PROCEDURE — 87502 INFLUENZA DNA AMP PROBE: CPT

## 2024-08-19 PROCEDURE — 87635 SARS-COV-2 COVID-19 AMP PRB: CPT

## 2024-08-19 PROCEDURE — 2580000003 HC RX 258: Performed by: STUDENT IN AN ORGANIZED HEALTH CARE EDUCATION/TRAINING PROGRAM

## 2024-08-19 PROCEDURE — 87205 SMEAR GRAM STAIN: CPT

## 2024-08-19 RX ORDER — SODIUM CHLORIDE 9 MG/ML
INJECTION, SOLUTION INTRAVENOUS PRN
Status: DISCONTINUED | OUTPATIENT
Start: 2024-08-19 | End: 2024-08-28 | Stop reason: HOSPADM

## 2024-08-19 RX ORDER — METHYLPREDNISOLONE SODIUM SUCCINATE 40 MG/ML
40 INJECTION, POWDER, LYOPHILIZED, FOR SOLUTION INTRAMUSCULAR; INTRAVENOUS ONCE
Status: COMPLETED | OUTPATIENT
Start: 2024-08-19 | End: 2024-08-19

## 2024-08-19 RX ORDER — SODIUM CHLORIDE 0.9 % (FLUSH) 0.9 %
5-40 SYRINGE (ML) INJECTION EVERY 12 HOURS SCHEDULED
Status: DISCONTINUED | OUTPATIENT
Start: 2024-08-19 | End: 2024-08-28 | Stop reason: HOSPADM

## 2024-08-19 RX ORDER — LOPERAMIDE HCL 2 MG
2 CAPSULE ORAL 4 TIMES DAILY
Status: DISCONTINUED | OUTPATIENT
Start: 2024-08-19 | End: 2024-08-28 | Stop reason: HOSPADM

## 2024-08-19 RX ORDER — POLYETHYLENE GLYCOL 3350 17 G/17G
17 POWDER, FOR SOLUTION ORAL DAILY PRN
Status: DISCONTINUED | OUTPATIENT
Start: 2024-08-19 | End: 2024-08-28 | Stop reason: HOSPADM

## 2024-08-19 RX ORDER — ACETAMINOPHEN 325 MG/1
650 TABLET ORAL ONCE
Status: COMPLETED | OUTPATIENT
Start: 2024-08-19 | End: 2024-08-19

## 2024-08-19 RX ORDER — ONDANSETRON 4 MG/1
4 TABLET, ORALLY DISINTEGRATING ORAL EVERY 8 HOURS PRN
Status: DISCONTINUED | OUTPATIENT
Start: 2024-08-19 | End: 2024-08-28 | Stop reason: HOSPADM

## 2024-08-19 RX ORDER — 0.9 % SODIUM CHLORIDE 0.9 %
500 INTRAVENOUS SOLUTION INTRAVENOUS ONCE
Status: COMPLETED | OUTPATIENT
Start: 2024-08-19 | End: 2024-08-19

## 2024-08-19 RX ORDER — 0.9 % SODIUM CHLORIDE 0.9 %
1000 INTRAVENOUS SOLUTION INTRAVENOUS ONCE
Status: COMPLETED | OUTPATIENT
Start: 2024-08-19 | End: 2024-08-19

## 2024-08-19 RX ORDER — IPRATROPIUM BROMIDE AND ALBUTEROL SULFATE 2.5; .5 MG/3ML; MG/3ML
1 SOLUTION RESPIRATORY (INHALATION)
Status: DISCONTINUED | OUTPATIENT
Start: 2024-08-20 | End: 2024-08-20

## 2024-08-19 RX ORDER — DIPHENHYDRAMINE HYDROCHLORIDE 50 MG/ML
25 INJECTION INTRAMUSCULAR; INTRAVENOUS EVERY 8 HOURS PRN
Status: DISCONTINUED | OUTPATIENT
Start: 2024-08-19 | End: 2024-08-28 | Stop reason: HOSPADM

## 2024-08-19 RX ORDER — POTASSIUM CHLORIDE 1500 MG/1
40 TABLET, EXTENDED RELEASE ORAL PRN
Status: DISCONTINUED | OUTPATIENT
Start: 2024-08-19 | End: 2024-08-28 | Stop reason: HOSPADM

## 2024-08-19 RX ORDER — TRAZODONE HYDROCHLORIDE 50 MG/1
100 TABLET, FILM COATED ORAL NIGHTLY
Status: DISCONTINUED | OUTPATIENT
Start: 2024-08-19 | End: 2024-08-28 | Stop reason: HOSPADM

## 2024-08-19 RX ORDER — ENOXAPARIN SODIUM 100 MG/ML
40 INJECTION SUBCUTANEOUS DAILY
Status: DISCONTINUED | OUTPATIENT
Start: 2024-08-19 | End: 2024-08-28 | Stop reason: HOSPADM

## 2024-08-19 RX ORDER — MIDODRINE HYDROCHLORIDE 5 MG/1
5 TABLET ORAL ONCE
Status: COMPLETED | OUTPATIENT
Start: 2024-08-19 | End: 2024-08-19

## 2024-08-19 RX ORDER — MORPHINE SULFATE 2 MG/ML
2 INJECTION, SOLUTION INTRAMUSCULAR; INTRAVENOUS ONCE
Status: COMPLETED | OUTPATIENT
Start: 2024-08-19 | End: 2024-08-19

## 2024-08-19 RX ORDER — MIDODRINE HYDROCHLORIDE 5 MG/1
10 TABLET ORAL ONCE
Status: COMPLETED | OUTPATIENT
Start: 2024-08-20 | End: 2024-08-20

## 2024-08-19 RX ORDER — ONDANSETRON 2 MG/ML
4 INJECTION INTRAMUSCULAR; INTRAVENOUS EVERY 6 HOURS PRN
Status: DISCONTINUED | OUTPATIENT
Start: 2024-08-19 | End: 2024-08-28 | Stop reason: HOSPADM

## 2024-08-19 RX ORDER — CALCIUM ACETATE MONOHYDRATE AND ALUMINUM SULFATE TETRADECAHYDRATE 952; 1347 MG/2299MG; MG/2299MG
1 POWDER, FOR SOLUTION TOPICAL 4 TIMES DAILY
Status: DISCONTINUED | OUTPATIENT
Start: 2024-08-19 | End: 2024-08-28 | Stop reason: HOSPADM

## 2024-08-19 RX ORDER — MORPHINE SULFATE 2 MG/ML
INJECTION, SOLUTION INTRAMUSCULAR; INTRAVENOUS
Status: COMPLETED
Start: 2024-08-19 | End: 2024-08-19

## 2024-08-19 RX ORDER — MORPHINE SULFATE 2 MG/ML
2 INJECTION, SOLUTION INTRAMUSCULAR; INTRAVENOUS
Status: DISCONTINUED | OUTPATIENT
Start: 2024-08-19 | End: 2024-08-28

## 2024-08-19 RX ORDER — ACETAMINOPHEN 650 MG/1
650 SUPPOSITORY RECTAL EVERY 6 HOURS PRN
Status: DISCONTINUED | OUTPATIENT
Start: 2024-08-19 | End: 2024-08-28 | Stop reason: SDUPTHER

## 2024-08-19 RX ORDER — FENTANYL CITRATE 50 UG/ML
50 INJECTION, SOLUTION INTRAMUSCULAR; INTRAVENOUS ONCE
Status: COMPLETED | OUTPATIENT
Start: 2024-08-19 | End: 2024-08-19

## 2024-08-19 RX ORDER — SODIUM CHLORIDE 0.9 % (FLUSH) 0.9 %
5-40 SYRINGE (ML) INJECTION PRN
Status: DISCONTINUED | OUTPATIENT
Start: 2024-08-19 | End: 2024-08-28 | Stop reason: HOSPADM

## 2024-08-19 RX ORDER — IOPAMIDOL 755 MG/ML
75 INJECTION, SOLUTION INTRAVASCULAR
Status: COMPLETED | OUTPATIENT
Start: 2024-08-19 | End: 2024-08-19

## 2024-08-19 RX ORDER — MAGNESIUM SULFATE IN WATER 40 MG/ML
2000 INJECTION, SOLUTION INTRAVENOUS PRN
Status: DISCONTINUED | OUTPATIENT
Start: 2024-08-19 | End: 2024-08-28 | Stop reason: HOSPADM

## 2024-08-19 RX ORDER — POTASSIUM CHLORIDE 7.45 MG/ML
10 INJECTION INTRAVENOUS PRN
Status: DISCONTINUED | OUTPATIENT
Start: 2024-08-19 | End: 2024-08-28 | Stop reason: HOSPADM

## 2024-08-19 RX ORDER — SODIUM CHLORIDE 9 MG/ML
INJECTION, SOLUTION INTRAVENOUS CONTINUOUS
Status: ACTIVE | OUTPATIENT
Start: 2024-08-19 | End: 2024-08-20

## 2024-08-19 RX ORDER — PANTOPRAZOLE SODIUM 40 MG/10ML
40 INJECTION, POWDER, LYOPHILIZED, FOR SOLUTION INTRAVENOUS 2 TIMES DAILY
Status: DISCONTINUED | OUTPATIENT
Start: 2024-08-19 | End: 2024-08-28 | Stop reason: HOSPADM

## 2024-08-19 RX ORDER — ACETAMINOPHEN 325 MG/1
650 TABLET ORAL EVERY 6 HOURS PRN
Status: DISCONTINUED | OUTPATIENT
Start: 2024-08-19 | End: 2024-08-28 | Stop reason: SDUPTHER

## 2024-08-19 RX ORDER — MORPHINE SULFATE 4 MG/ML
4 INJECTION, SOLUTION INTRAMUSCULAR; INTRAVENOUS
Status: DISCONTINUED | OUTPATIENT
Start: 2024-08-19 | End: 2024-08-28

## 2024-08-19 RX ORDER — LEVOTHYROXINE SODIUM 75 UG/1
75 TABLET ORAL DAILY
Status: DISCONTINUED | OUTPATIENT
Start: 2024-08-20 | End: 2024-08-28 | Stop reason: HOSPADM

## 2024-08-19 RX ADMIN — MIDODRINE HYDROCHLORIDE 5 MG: 5 TABLET ORAL at 21:11

## 2024-08-19 RX ADMIN — PANTOPRAZOLE SODIUM 40 MG: 40 INJECTION, POWDER, FOR SOLUTION INTRAVENOUS at 21:14

## 2024-08-19 RX ADMIN — SODIUM CHLORIDE 500 ML: 9 INJECTION, SOLUTION INTRAVENOUS at 14:59

## 2024-08-19 RX ADMIN — MORPHINE SULFATE 2 MG: 2 INJECTION, SOLUTION INTRAMUSCULAR; INTRAVENOUS at 19:40

## 2024-08-19 RX ADMIN — ANORECTAL OINTMENT: 15.7; .44; 24; 20.6 OINTMENT TOPICAL at 19:44

## 2024-08-19 RX ADMIN — ONDANSETRON 4 MG: 4 TABLET, ORALLY DISINTEGRATING ORAL at 19:43

## 2024-08-19 RX ADMIN — PIPERACILLIN AND TAZOBACTAM 4500 MG: 4; .5 INJECTION, POWDER, LYOPHILIZED, FOR SOLUTION INTRAVENOUS at 14:54

## 2024-08-19 RX ADMIN — TRAZODONE HYDROCHLORIDE 100 MG: 50 TABLET ORAL at 21:12

## 2024-08-19 RX ADMIN — PETROLATUM: 420 OINTMENT TOPICAL at 19:42

## 2024-08-19 RX ADMIN — SODIUM CHLORIDE 1000 ML: 9 INJECTION, SOLUTION INTRAVENOUS at 13:56

## 2024-08-19 RX ADMIN — POTASSIUM BICARBONATE 40 MEQ: 782 TABLET, EFFERVESCENT ORAL at 19:45

## 2024-08-19 RX ADMIN — FENTANYL CITRATE 50 MCG: 50 INJECTION INTRAMUSCULAR; INTRAVENOUS at 12:02

## 2024-08-19 RX ADMIN — SERTRALINE 50 MG: 50 TABLET, FILM COATED ORAL at 21:12

## 2024-08-19 RX ADMIN — Medication 1 PACKET: at 19:38

## 2024-08-19 RX ADMIN — SODIUM CHLORIDE, PRESERVATIVE FREE 10 ML: 5 INJECTION INTRAVENOUS at 21:13

## 2024-08-19 RX ADMIN — VANCOMYCIN HYDROCHLORIDE 1250 MG: 10 INJECTION, POWDER, LYOPHILIZED, FOR SOLUTION INTRAVENOUS at 14:57

## 2024-08-19 RX ADMIN — METHYLPREDNISOLONE SODIUM SUCCINATE 40 MG: 40 INJECTION INTRAMUSCULAR; INTRAVENOUS at 21:15

## 2024-08-19 RX ADMIN — SODIUM CHLORIDE: 9 INJECTION, SOLUTION INTRAVENOUS at 15:33

## 2024-08-19 RX ADMIN — ACETAMINOPHEN 650 MG: 325 TABLET ORAL at 12:01

## 2024-08-19 RX ADMIN — IOPAMIDOL 75 ML: 755 INJECTION, SOLUTION INTRAVENOUS at 14:15

## 2024-08-19 RX ADMIN — SODIUM CHLORIDE 1000 ML: 9 INJECTION, SOLUTION INTRAVENOUS at 12:59

## 2024-08-19 ASSESSMENT — PAIN DESCRIPTION - DESCRIPTORS
DESCRIPTORS: BURNING

## 2024-08-19 ASSESSMENT — PAIN DESCRIPTION - ORIENTATION
ORIENTATION: LOWER;MID
ORIENTATION: MID
ORIENTATION: MID
ORIENTATION: MID;LOWER
ORIENTATION: LEFT;LOWER

## 2024-08-19 ASSESSMENT — PAIN DESCRIPTION - FREQUENCY: FREQUENCY: CONTINUOUS

## 2024-08-19 ASSESSMENT — PAIN SCALES - GENERAL
PAINLEVEL_OUTOF10: 7
PAINLEVEL_OUTOF10: 9
PAINLEVEL_OUTOF10: 10

## 2024-08-19 ASSESSMENT — PAIN - FUNCTIONAL ASSESSMENT
PAIN_FUNCTIONAL_ASSESSMENT: 0-10
PAIN_FUNCTIONAL_ASSESSMENT: PREVENTS OR INTERFERES SOME ACTIVE ACTIVITIES AND ADLS

## 2024-08-19 ASSESSMENT — PAIN DESCRIPTION - LOCATION
LOCATION: ABDOMEN

## 2024-08-19 ASSESSMENT — PAIN DESCRIPTION - PAIN TYPE: TYPE: ACUTE PAIN

## 2024-08-19 NOTE — ED NOTES
This nurse changed patient with oncoming nurse. Pt bed wet from urine and yellow drainage from fistula. Placed purewick. Pt very anxious. Calmed patient down by stating \"slow long deep breaths in through nose and out through mouth.\"

## 2024-08-19 NOTE — ED PROVIDER NOTES
[facility] INTERNAL MEDICINE 2  EMERGENCY DEPARTMENT ENCOUNTER        Pt Name: Maryam Santana  MRN: [id number]  Birthdate 1965  Date of evaluation: 2024  Provider: Portia Fraire DO  PCP: Leyla Urban DO  Note Started: 11:14 AM EDT 24    CHIEF COMPLAINT       Chief Complaint   Patient presents with    Abdominal Pain     Fistula draining large amount yellow drainage.     Fever       HISTORY OF PRESENT ILLNESS: 1 or more Elements   History From: Patient    Limitations to history : None    Maryam Santana is a 59 y.o. female who presents with concern for drainage out of an wound on her abdomen.  She notes that a year ago she had a hysterectomy, there was a mishap following the operation and her bowels next.  She then ended up with a colostomy bag.  She is a history of Crohn's and has had surgeries on her fistulas as well.  She does have a fistula that is draining on the surface of her abdomen and notes that it has been draining substantially more than normal over the past day or so.  She is not having fevers at home, no chest pain difficulty breathing, nothing is made the pain better or worse.  She does use barrier cream frequently at the site as her skin is very irritated.  There is not been much output into the colostomy bag today.  No other associated complaints.      EXTERNAL NOTE REVIEW:       on chart review she was last in the hospital for bacteremia on 3/7/2024.  Last general surgery for follow-up on Crohn's disease with fistula on 5/10/2024.    REVIEW OF SYSTEMS :      Positives and Pertinent negatives as per HPI.     SURGICAL HISTORY     Past Surgical History:   Procedure Laterality Date    APPENDECTOMY       SECTION      CHOLECYSTECTOMY      COLON SURGERY      HERNIA REPAIR      INSERT PICC LINE  2024    OVARY REMOVAL      PICC LINE INSERTION NURSE  3/11/2024    TUNNELED VENOUS PORT PLACEMENT      also removed       CURRENTMEDICATIONS       Current Discharge Medication List  IntraVENous Given 8/19/24 2115)         Is this patient to be included in the SEP-1 core measure? No Exclusion criteria - the patient is NOT to be included for SEP-1 Core Measure due to: 2+ SIRS criteria are not met        Medical Decision Making/Differential Diagnosis:    CC/HPI Summary, Social Determinants of health, Records Reviewed, DDx, testing done/not done, ED Course, Reassessment, disposition considerations/shared decision making with patient, consults, disposition:      ED Course as of 08/19/24 2326   Mon Aug 19, 2024   1214 WBC(!): 21.4 [MB]   1214 Hemoglobin Quant: 12.5 [MB]   1214 Platelet Count: 245 [MB]   1552 Discussed with patient's general surgeon, they will consult. [MB]   1800 Discussed with internal medicine they will except for admission [MB]      ED Course User Index  [MB] Portia Fraire, DO        Medical Decision Making  Amount and/or Complexity of Data Reviewed  Labs: ordered. Decision-making details documented in ED Course.  Radiology: ordered.    Risk  OTC drugs.  Prescription drug management.  Decision regarding hospitalization.        59-year-old female past medical history of Crohn's disease, hypertension, diabetes presents today with concern for generalized abdominal pain and fever.  Hemodynamically stable on arrival to emergency ferment, she is febrile to 101 but overall does not toxic appearing and in no acute distress.  Uncomfortable on abdominal exam, she does have a draining fistula with tender erythematous skin.  No crepitus.  Port right chest wall for TPN feedings.  Clear breath sounds bilaterally.    Differential diagnosis to include but not limited to sepsis, dehydration, abscess.    Given IV fluid bolus, p.o. Tylenol as she is febrile on arrival, IV fentanyl.  Lactic acid elevated to 5.3 downtrending to 3.7 after fluid boluses, leukocytosis to 21.4, hypokalemia at 2.9 repleted p.o. and IV while in the ER, evidence of acute kidney injury with a creatinine increased to 1.6 from

## 2024-08-19 NOTE — PROGRESS NOTES
Database initiated pharmacy and medications verified with the patient. She is A&O comes in from home with Chu. She has a cane/walker and uses them as needed. She has fallen recently. She has a colostomy which is painful, excoriated and leaking from a fistula?. She is RA at baseline.  She has a right chest port that she gets TPN through. She's allowed clear liquids food by mouth. Wound care was here to see her already. [facility] delivers her TPN.

## 2024-08-19 NOTE — PROGRESS NOTES
Seen in ED  Alert and oriented. Family present.  Lc/0 lots of pain, screaming due to denuded skin      Multiple attempts to pouch fistula. Cannot due to geographic changes  Large almost constant yellow effluent out of fistula.  Per patient and family, distal fistula has cloed  Colostomy pouch changed. No out put from colostomy for several days. Stoma pink, slightly flaccid    Midline very raw, denuded. Tx powder, no sting, dakins  As unable to contain at this time  Will use calmoseptine and aquaphor and ostomy powder to protect skin  Surgery to see  Francesca Rock, CNS, Wound Care                        Francesca Rock, CNS, Wound Care

## 2024-08-19 NOTE — ED NOTES
ED to Inpatient Handoff Report    Notified nursing staff on 4W that electronic handoff available and patient ready for transport to room 0423.    Safety Risks: Risk of falls    Patient in Restraints: no    Constant Observer or Patient : no    Telemetry Monitoring Ordered :Yes           Order to transfer to unit without monitor:NO    Last MEWS: 1 Time completed: 1832    Deterioration Index Score:   Predictive Model Details          21 (Normal)  Factor Value    Calculated 8/19/2024 18:33 50% Age 59 years old    Deterioration Index Model 19% WBC count abnormal (21.4 k/uL)     8% Sodium abnormal (130 mmol/L)     8% Potassium abnormal (2.9 mmol/L)     7% Systolic 104     5% BUN abnormal (39 mg/dL)     3% Pulse 60     0% Pulse oximetry 95 %     0% Respiratory rate 16     0% Temperature 98.2 °F (36.8 °C)     0% Hematocrit 36.8 %        Vitals:    08/19/24 1636 08/19/24 1711 08/19/24 1754 08/19/24 1832   BP: (!) 107/95 104/72 95/68 104/60   Pulse: 63 57 78 60   Resp: 18 18 17 16   Temp:    98.2 °F (36.8 °C)   TempSrc:    Oral   SpO2: 95% 95% 95% 95%   Weight:       Height:             Opportunity for questions and clarification was provided.

## 2024-08-20 LAB
ALBUMIN SERPL-MCNC: 3.4 G/DL (ref 3.5–5.2)
ALP SERPL-CCNC: 98 U/L (ref 35–104)
ALT SERPL-CCNC: 17 U/L (ref 0–32)
ANION GAP SERPL CALCULATED.3IONS-SCNC: 11 MMOL/L (ref 7–16)
ASO AB SERPL-ACNC: 167 IU/ML (ref 0–200)
AST SERPL-CCNC: 31 U/L (ref 0–31)
BASOPHILS # BLD: 0.01 K/UL (ref 0–0.2)
BASOPHILS NFR BLD: 0 % (ref 0–2)
BILIRUB DIRECT SERPL-MCNC: <0.2 MG/DL (ref 0–0.3)
BILIRUB INDIRECT SERPL-MCNC: ABNORMAL MG/DL (ref 0–1)
BILIRUB SERPL-MCNC: 0.5 MG/DL (ref 0–1.2)
BUN SERPL-MCNC: 28 MG/DL (ref 6–20)
CALCIUM SERPL-MCNC: 9.1 MG/DL (ref 8.6–10.2)
CHLORIDE SERPL-SCNC: 100 MMOL/L (ref 98–107)
CO2 SERPL-SCNC: 28 MMOL/L (ref 22–29)
CORTIS SERPL-MCNC: 7.7 UG/DL (ref 2.7–18.4)
CREAT SERPL-MCNC: 1.3 MG/DL (ref 0.5–1)
CRP SERPL HS-MCNC: 83 MG/L (ref 0–5)
EOSINOPHIL # BLD: 0 K/UL (ref 0.05–0.5)
EOSINOPHILS RELATIVE PERCENT: 0 % (ref 0–6)
ERYTHROCYTE [DISTWIDTH] IN BLOOD BY AUTOMATED COUNT: 13.7 % (ref 11.5–15)
ERYTHROCYTE [SEDIMENTATION RATE] IN BLOOD BY WESTERGREN METHOD: 57 MM/HR (ref 0–20)
GFR, ESTIMATED: 49 ML/MIN/1.73M2
GLUCOSE BLD-MCNC: 118 MG/DL (ref 74–99)
GLUCOSE BLD-MCNC: 140 MG/DL (ref 74–99)
GLUCOSE SERPL-MCNC: 141 MG/DL (ref 74–99)
HCT VFR BLD AUTO: 35.1 % (ref 34–48)
HGB BLD-MCNC: 11.5 G/DL (ref 11.5–15.5)
IMM GRANULOCYTES # BLD AUTO: 0.03 K/UL (ref 0–0.58)
IMM GRANULOCYTES NFR BLD: 0 % (ref 0–5)
LACTATE BLDV-SCNC: 1.8 MMOL/L (ref 0.5–2.2)
LYMPHOCYTES NFR BLD: 1.09 K/UL (ref 1.5–4)
LYMPHOCYTES RELATIVE PERCENT: 12 % (ref 20–42)
MCH RBC QN AUTO: 29.4 PG (ref 26–35)
MCHC RBC AUTO-ENTMCNC: 32.8 G/DL (ref 32–34.5)
MCV RBC AUTO: 89.8 FL (ref 80–99.9)
MONOCYTES NFR BLD: 0.22 K/UL (ref 0.1–0.95)
MONOCYTES NFR BLD: 2 % (ref 2–12)
NEUTROPHILS NFR BLD: 86 % (ref 43–80)
NEUTS SEG NFR BLD: 8.12 K/UL (ref 1.8–7.3)
PLATELET # BLD AUTO: 217 K/UL (ref 130–450)
PMV BLD AUTO: 9 FL (ref 7–12)
POTASSIUM SERPL-SCNC: 3.2 MMOL/L (ref 3.5–5)
PROCALCITONIN SERPL-MCNC: 6.08 NG/ML (ref 0–0.08)
PROT SERPL-MCNC: 6.9 G/DL (ref 6.4–8.3)
RBC # BLD AUTO: 3.91 M/UL (ref 3.5–5.5)
SODIUM SERPL-SCNC: 139 MMOL/L (ref 132–146)
WBC OTHER # BLD: 9.5 K/UL (ref 4.5–11.5)

## 2024-08-20 PROCEDURE — 6360000002 HC RX W HCPCS

## 2024-08-20 PROCEDURE — 84145 PROCALCITONIN (PCT): CPT

## 2024-08-20 PROCEDURE — 83605 ASSAY OF LACTIC ACID: CPT

## 2024-08-20 PROCEDURE — 85025 COMPLETE CBC W/AUTO DIFF WBC: CPT

## 2024-08-20 PROCEDURE — 85652 RBC SED RATE AUTOMATED: CPT

## 2024-08-20 PROCEDURE — 80053 COMPREHEN METABOLIC PANEL: CPT

## 2024-08-20 PROCEDURE — 2580000003 HC RX 258

## 2024-08-20 PROCEDURE — 6370000000 HC RX 637 (ALT 250 FOR IP)

## 2024-08-20 PROCEDURE — 96366 THER/PROPH/DIAG IV INF ADDON: CPT

## 2024-08-20 PROCEDURE — 2580000003 HC RX 258: Performed by: STUDENT IN AN ORGANIZED HEALTH CARE EDUCATION/TRAINING PROGRAM

## 2024-08-20 PROCEDURE — 86140 C-REACTIVE PROTEIN: CPT

## 2024-08-20 PROCEDURE — 6360000002 HC RX W HCPCS: Performed by: STUDENT IN AN ORGANIZED HEALTH CARE EDUCATION/TRAINING PROGRAM

## 2024-08-20 PROCEDURE — 82533 TOTAL CORTISOL: CPT

## 2024-08-20 PROCEDURE — 99223 1ST HOSP IP/OBS HIGH 75: CPT | Performed by: STUDENT IN AN ORGANIZED HEALTH CARE EDUCATION/TRAINING PROGRAM

## 2024-08-20 PROCEDURE — 2500000003 HC RX 250 WO HCPCS

## 2024-08-20 PROCEDURE — 2580000003 HC RX 258: Performed by: INTERNAL MEDICINE

## 2024-08-20 PROCEDURE — 6360000002 HC RX W HCPCS: Performed by: INTERNAL MEDICINE

## 2024-08-20 PROCEDURE — 6360000002 HC RX W HCPCS: Performed by: SPECIALIST

## 2024-08-20 PROCEDURE — 6370000000 HC RX 637 (ALT 250 FOR IP): Performed by: INTERNAL MEDICINE

## 2024-08-20 PROCEDURE — 82962 GLUCOSE BLOOD TEST: CPT

## 2024-08-20 PROCEDURE — 2580000003 HC RX 258: Performed by: SPECIALIST

## 2024-08-20 PROCEDURE — 82248 BILIRUBIN DIRECT: CPT

## 2024-08-20 PROCEDURE — 99232 SBSQ HOSP IP/OBS MODERATE 35: CPT | Performed by: STUDENT IN AN ORGANIZED HEALTH CARE EDUCATION/TRAINING PROGRAM

## 2024-08-20 PROCEDURE — 96376 TX/PRO/DX INJ SAME DRUG ADON: CPT

## 2024-08-20 PROCEDURE — 96372 THER/PROPH/DIAG INJ SC/IM: CPT

## 2024-08-20 PROCEDURE — 6370000000 HC RX 637 (ALT 250 FOR IP): Performed by: EMERGENCY MEDICINE

## 2024-08-20 PROCEDURE — 86063 ANTISTREPTOLYSIN O SCREEN: CPT

## 2024-08-20 PROCEDURE — 2060000000 HC ICU INTERMEDIATE R&B

## 2024-08-20 RX ORDER — GLUCAGON 1 MG/ML
1 KIT INJECTION PRN
Status: DISCONTINUED | OUTPATIENT
Start: 2024-08-20 | End: 2024-08-28 | Stop reason: HOSPADM

## 2024-08-20 RX ORDER — IPRATROPIUM BROMIDE AND ALBUTEROL SULFATE 2.5; .5 MG/3ML; MG/3ML
1 SOLUTION RESPIRATORY (INHALATION) EVERY 4 HOURS PRN
Status: DISCONTINUED | OUTPATIENT
Start: 2024-08-20 | End: 2024-08-28 | Stop reason: HOSPADM

## 2024-08-20 RX ORDER — INSULIN LISPRO 100 [IU]/ML
0-4 INJECTION, SOLUTION INTRAVENOUS; SUBCUTANEOUS NIGHTLY
Status: DISCONTINUED | OUTPATIENT
Start: 2024-08-20 | End: 2024-08-23

## 2024-08-20 RX ORDER — FLUCONAZOLE 2 MG/ML
400 INJECTION, SOLUTION INTRAVENOUS EVERY 24 HOURS
Status: DISCONTINUED | OUTPATIENT
Start: 2024-08-20 | End: 2024-08-26

## 2024-08-20 RX ORDER — DEXTROSE MONOHYDRATE 100 MG/ML
INJECTION, SOLUTION INTRAVENOUS CONTINUOUS PRN
Status: DISCONTINUED | OUTPATIENT
Start: 2024-08-20 | End: 2024-08-28 | Stop reason: HOSPADM

## 2024-08-20 RX ORDER — INSULIN LISPRO 100 [IU]/ML
0-4 INJECTION, SOLUTION INTRAVENOUS; SUBCUTANEOUS
Status: DISCONTINUED | OUTPATIENT
Start: 2024-08-20 | End: 2024-08-23

## 2024-08-20 RX ADMIN — ANORECTAL OINTMENT: 15.7; .44; 24; 20.6 OINTMENT TOPICAL at 21:39

## 2024-08-20 RX ADMIN — MORPHINE SULFATE 4 MG: 4 INJECTION, SOLUTION INTRAMUSCULAR; INTRAVENOUS at 12:30

## 2024-08-20 RX ADMIN — POTASSIUM CHLORIDE: 2 INJECTION, SOLUTION, CONCENTRATE INTRAVENOUS at 19:23

## 2024-08-20 RX ADMIN — TRAZODONE HYDROCHLORIDE 100 MG: 50 TABLET ORAL at 21:50

## 2024-08-20 RX ADMIN — PANTOPRAZOLE SODIUM 40 MG: 40 INJECTION, POWDER, FOR SOLUTION INTRAVENOUS at 09:11

## 2024-08-20 RX ADMIN — SODIUM CHLORIDE, PRESERVATIVE FREE 10 ML: 5 INJECTION INTRAVENOUS at 21:39

## 2024-08-20 RX ADMIN — SERTRALINE 50 MG: 50 TABLET, FILM COATED ORAL at 09:11

## 2024-08-20 RX ADMIN — ANORECTAL OINTMENT: 15.7; .44; 24; 20.6 OINTMENT TOPICAL at 16:08

## 2024-08-20 RX ADMIN — Medication 1 PACKET: at 11:24

## 2024-08-20 RX ADMIN — MORPHINE SULFATE 2 MG: 2 INJECTION, SOLUTION INTRAMUSCULAR; INTRAVENOUS at 19:28

## 2024-08-20 RX ADMIN — LOPERAMIDE HYDROCHLORIDE 2 MG: 2 CAPSULE ORAL at 09:11

## 2024-08-20 RX ADMIN — ENOXAPARIN SODIUM 40 MG: 100 INJECTION SUBCUTANEOUS at 09:10

## 2024-08-20 RX ADMIN — LEVOTHYROXINE SODIUM 75 MCG: 75 TABLET ORAL at 05:32

## 2024-08-20 RX ADMIN — LOPERAMIDE HYDROCHLORIDE 2 MG: 2 CAPSULE ORAL at 12:15

## 2024-08-20 RX ADMIN — CEFEPIME 2000 MG: 2 INJECTION, POWDER, FOR SOLUTION INTRAVENOUS at 21:51

## 2024-08-20 RX ADMIN — Medication 1 PACKET: at 09:11

## 2024-08-20 RX ADMIN — PIPERACILLIN AND TAZOBACTAM 4500 MG: 4; .5 INJECTION, POWDER, LYOPHILIZED, FOR SOLUTION INTRAVENOUS at 03:05

## 2024-08-20 RX ADMIN — PANTOPRAZOLE SODIUM 40 MG: 40 INJECTION, POWDER, FOR SOLUTION INTRAVENOUS at 21:50

## 2024-08-20 RX ADMIN — SODIUM CHLORIDE: 9 INJECTION, SOLUTION INTRAVENOUS at 07:31

## 2024-08-20 RX ADMIN — ANORECTAL OINTMENT: 15.7; .44; 24; 20.6 OINTMENT TOPICAL at 12:25

## 2024-08-20 RX ADMIN — MORPHINE SULFATE 4 MG: 4 INJECTION, SOLUTION INTRAMUSCULAR; INTRAVENOUS at 15:12

## 2024-08-20 RX ADMIN — LOPERAMIDE HYDROCHLORIDE 2 MG: 2 CAPSULE ORAL at 16:07

## 2024-08-20 RX ADMIN — LOPERAMIDE HYDROCHLORIDE 2 MG: 2 CAPSULE ORAL at 21:50

## 2024-08-20 RX ADMIN — FLUCONAZOLE 400 MG: 400 INJECTION, SOLUTION INTRAVENOUS at 12:16

## 2024-08-20 RX ADMIN — CEFEPIME 2000 MG: 2 INJECTION, POWDER, FOR SOLUTION INTRAVENOUS at 12:16

## 2024-08-20 RX ADMIN — ANORECTAL OINTMENT: 15.7; .44; 24; 20.6 OINTMENT TOPICAL at 09:40

## 2024-08-20 RX ADMIN — SODIUM CHLORIDE, PRESERVATIVE FREE 10 ML: 5 INJECTION INTRAVENOUS at 09:11

## 2024-08-20 RX ADMIN — MIDODRINE HYDROCHLORIDE 10 MG: 5 TABLET ORAL at 00:01

## 2024-08-20 ASSESSMENT — PAIN DESCRIPTION - ORIENTATION
ORIENTATION: MID
ORIENTATION: MID
ORIENTATION: RIGHT;MID
ORIENTATION: MID

## 2024-08-20 ASSESSMENT — PAIN DESCRIPTION - LOCATION
LOCATION: ABDOMEN

## 2024-08-20 ASSESSMENT — PAIN SCALES - GENERAL
PAINLEVEL_OUTOF10: 6
PAINLEVEL_OUTOF10: 7
PAINLEVEL_OUTOF10: 8
PAINLEVEL_OUTOF10: 7
PAINLEVEL_OUTOF10: 0

## 2024-08-20 ASSESSMENT — PAIN DESCRIPTION - DESCRIPTORS
DESCRIPTORS: ACHING
DESCRIPTORS: SORE;STABBING;SHARP
DESCRIPTORS: CRAMPING
DESCRIPTORS: CRAMPING

## 2024-08-20 ASSESSMENT — PAIN DESCRIPTION - PAIN TYPE: TYPE: ACUTE PAIN

## 2024-08-20 NOTE — CONSULTS
[facility]  [facility]  Consultation Note     Admit Date: 8/19/2024 10:53 AM    Reason for Consult:   Concerns for sepsis.  + Crohn's and enterocutaneous fistula.  + In March history of PICC bacteremia in past    Attending Physician:  Duncan Love MD    HISTORY OF PRESENT ILLNESS:             The history is obtained from extensive review of available past medical records. The patient is a 59 y.o. female who is previously known to the ID service.  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  The patient presented to the ED at [facility] on 8/19/2024 with concerns of a draining from an abdominal wound.  She was not having any fevers.  She is using a barrier cream around the fistula but her skin has been fairly irritated.  On presentation he had a temperature of 101 °F and was tachypneic.  White count was 21.4 and has normalized.  CMP showed a creatinine of 1.6 with a BUN of 49.  These are improving.  Potassium was slightly low.  She was treated with Vancomycin and Zosyn. CT of the abdomen and pelvis showed 2 fistulous tracts.  The patient admits to having some chills the day before she came to the hospital.  She also said that she had noticed a small abscess around one of the fistulous tract that opened up and drained.  She is feeling better today.    Past Medical History:    March 2024.  Admitted to [facility] with Rhizobium radiobacter bacteremia while she was on TPN via a PICC.  She was treated with Cefepime.  Seen by ID for a CLABSI.  She was discharged and completed course of Cefepime.  She did not follow-up in the office.    December 2023-January 2024: Admitted to [facility] ICU with fevers and chills, up to 102.6 with hypotension. Diagnosed with septic shock and likely CLABSI. She was started on Zosyn and Vancomycin.  Blood cultures grew Klebsiella pneumoniae. PICC was pulled. Vancomycin was stopped. Tip  Vital Sign     Worried About Running Out of Food in the Last Year: Never true     Ran Out of Food in the Last Year: Never true   Transportation Needs: No Transportation Needs (8/19/2024)    PRAPARE - Transportation     Lack of Transportation (Medical): No     Lack of Transportation (Non-Medical): No   Housing Stability: Low Risk  (8/19/2024)    Housing Stability Vital Sign     Unable to Pay for Housing in the Last Year: No     Number of Times Moved in the Last Year: 0     Homeless in the Last Year: No      Pets: dog  Travel: none  She lives at home with her boyfriend     Family History:       Problem Relation Age of Onset    Crohn's Disease Sister    . Otherwise non-pertinent to the chief complaint.    REVIEW OF SYSTEMS:    Constitutional: Positive for fevers, chills, diaphoresis  Neurologic: Negative   Psychiatric: Negative  Rheumatologic: Negative   Endocrine: Negative  Hematologic: Negative  Immunologic: Negative  ENT: Negative  Respiratory: Negative   Cardiovascular: Negative  GI: As in the HPI  : Negative  Musculoskeletal: Negative  Skin: No rashes.     PHYSICAL EXAM:    Vitals:   BP (!) 95/50   Pulse 52   Temp 98 °F (36.7 °C) (Oral)   Resp 20   Ht 1.549 m (5' 1\")   Wt 65 kg (143 lb 4.8 oz)   SpO2 94%   BMI 27.08 kg/m²   Constitutional: The patient is awake, alert, and oriented.  Lying in bed.  She is in no distress.  Visitor in room.  Skin: Warm and dry. No rashes were noted.   HEENT: Eyes show round, and reactive pupils. No jaundice. Moist mucous membranes, no ulcerations, no thrush.   Neck: Supple to movements. No lymphadenopathy.   Chest: No use of accessory muscles to breathe. Symmetrical expansion. Auscultation reveals no wheezing, crackles, or rhonchi.   Cardiovascular: S1 and S2 are rhythmic and regular. No murmurs appreciated.   Abdomen: Positive bowel sounds to auscultation.  Incision healed.  The bottom does show fistulous with bilious drainage.  Left lower quadrant ostomy with minimal

## 2024-08-20 NOTE — CONSULTS
GENERAL SURGERY  CONSULT NOTE  2024    Physician Consulted: Dr. Hunt  Reason for Consult: fistula, sepsis  Referring Physician: Dr. Rosa M ROSENBAUM  Maryam Santana is a 59 y.o. female who presents to general surgery service for evaluation of abdominal wound which she states has been draining more than normal for the past day. Pt has a known enterocutaneous fistula. In May, she had an ovarian mass that was removed on 5/15. Two weeks post she came back into the hospital and has septic shock and free air and fluid. She was taken for an ex lap which showed significant amount of succus throughout her abdomen and was found to have small bowel enterotomy and sigmoid colotomy. These were repaired. 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 She has a history of Crohns disease which she has been on Humira for and follows with GI.      Pt presented due to abdominal pain and increased drainage from her fistula.     Last office visit no drainage from her ECF, now she is having some bilious output. No abdominal pain, no n/v present.     She had a history of colon cancer which she had a sigmoidectomy for in the past. Her last colonoscopy was 2023 which was negative    Upon presentation, pt had T max of 101 and has been afebrile since. Her blood pressures have been soft. Labs show WBC 21.4, Hb 12.5, Cr 1.6, Lactic acid 3.7 from 5.3.      Past Medical History:   Diagnosis Date    Cancer (HCC)     colon    Depression     Fissure, anal     Hernia     Hypertension        Past Surgical History:   Procedure Laterality Date    APPENDECTOMY       SECTION      CHOLECYSTECTOMY      COLON SURGERY      HERNIA REPAIR      INSERT PICC LINE  2024    OVARY REMOVAL      PICC LINE INSERTION NURSE  3/11/2024    TUNNELED VENOUS PORT PLACEMENT      also removed       Medications Prior to Admission:    Prior to Admission medications    Medication Sig Start Date End Date Taking?  valve formation.  Please correlate with surgical history if the patient had full right-sided colectomy or not. There is no acute inflammatory changes in the omental/mesentery fat planes not relate with the site the tethering of bowel segments towards the fistula. There are normal size and portal venous phase contrast enhancement for the liver.  There are few erika enhancing lesions in the left lobe of the liver as observed previously they are stable back the study March 2020 for more likely they represent erika cysts. Patient had previous cholecystectomy.  The portal venous system is patent the.  The biliary tract is not dilated. The pancreas appear unremarkable.  There is no dilatation peripancreatic duct. Aorta and IVC appear unremarkable. Kidneys are preserved size cortical thickness and cortical enhancement. There is no renal calculus obstruction. There is no midline retroperitoneal adenopathy. The uterus has unremarkable appearance.  The ovaries more difficult be seen separate from adjacent bowel segments but they do not appear to be enlarged. Patient had previous sigmoidectomy with colon rectal anastomosis which is patent and of unremarkable appearance. Lower lung bases demonstrate no significant findings.  Areas of sub segmental atelectasis are seen in the left lower lobe. Degenerative changes are seen in the right and left hip joints with prominent subchondral cystic areas and narrowing of the joint space more severe on the right-side.  These indicates a pattern of chronic synovitis.     1.  Presence of 2 fistula tract relate with group of small bowel segments which had anastomotic areas, located proximal to the ileostomy loop.  The fistula opening inferior to the site of the ileostomy 1 in the midline in the umbilical region and the other in the left paraumbilical area. 2.  In further evaluation of the fistula tract architecture and relationship with bowel segments can be achieved with fistulogram followed by

## 2024-08-20 NOTE — CONSULTS
Comprehensive Nutrition Assessment    Type and Reason for Visit:  Initial, Positive Nutrition Screen, Consult (Parenteral Nutrition Recommendations - Provider to manage)    Nutrition Recommendations/Plan:   Recommend continue PN and NPO d/t altered GI structure and function. Due to long-term PN, recommend intermittent lipids vs. Daily lipids in 3-in-1 Central PN:    TPN RECOMMENDATION:  Standard 2-in-1 Central PN at 65 ml/hr (1560 ml/d) with Lipids 250 ml 20% x 3/week  This will provide: 1322 kana, 78 g AA  This regimen will meet ~100% energy and protein needs    Continue inpatient monitoring      Malnutrition Assessment:  Malnutrition Status:  At risk for malnutrition (Comment) (08/20/24 1135)    Context:  Chronic Illness     Findings of the 6 clinical characteristics of malnutrition:  Energy Intake:  Mild decrease in energy intake (Comment) (NPO until PN ordered)  Weight Loss:  No significant weight loss     Body Fat Loss:  No significant body fat loss     Muscle Mass Loss:  Mild muscle mass loss Clavicles (pectoralis & deltoids)  Fluid Accumulation:  No significant fluid accumulation     Strength:  Not Performed    Nutrition Assessment:    Pt admit with sepsis, EC fistula and extensive surgical history. Has been on TPN for over a year at home 2/2 altered GI structure/function. Pt has draining at ECF site and colostomy, with significant pain. Note plan for pt to be referred to [facility] re: small bowel transplant. Recommend continue TPN and NPO. Will recommend continuous PN while inpt which may differ from home regimen.    Nutrition Related Findings:    A&Ox4, tender round abd w/pouched EC fistula, elevated lactic acid, K+ 3.2, +I/O 1.35L, no edema, colostomy w/little output Wound Type: Multiple (denuded skin around leaking ECF, colostomy - per wound care)       Current Nutrition Intake & Therapies:    Average Meal Intake: NPO  Average Supplements Intake: NPO  Diet NPO Exceptions are: Sips of Water with

## 2024-08-20 NOTE — ACP (ADVANCE CARE PLANNING)
Advance Care Planning   Healthcare Decision Maker:    Primary Decision Maker: Chu Wells  Fernanda - [phone]    Click here to complete Healthcare Decision Makers including selection of the Healthcare Decision Maker Relationship (ie \"Primary\").  Patient indicates she has DPOA-HC documents at home and will provide copy for medical record.

## 2024-08-20 NOTE — FLOWSHEET NOTE
Inpatient Wound Care (follow up) 423    Admit Date: 8/19/2024 10:53 AM    Reason for consult:  fistula, colostomy    Patient laying in bed, awake, alert and oriented.     Significant history:  per H&P    Chief Complaint:  severe abd pain  History of Present Illness   The patient is a 59 y.o. female    Pcp- Matthew Russell MD   past medical history HTN, asthma,colon cancer?   Crohn's disease- previously on humira  One year ago sp hysterectomy - bowel perforation- with colostomy bag since-- she used to also be on TPN  Hx of  enterocutaneous fistula,-- with new exac of large amount of  abd/umbilical area/fistula drainage of bilous-yellow large amount/liquis ? stool material-- in past had used barrier cream, but reports abd area around umbilicus is red/tender and irritated.  At now colostomy bag site, no issues-- in fact much less stool there now    Past Medical History:   Diagnosis Date    Cancer (HCC)     colon    Depression     Fissure, anal     Hernia     Hypertension      Findings:     08/20/24 1544   Colostomy LLQ   Placement Date/Time: (c) 12/28/23 (c) 2300   Present on Admission/Arrival: Yes  Description (optional): colostomy  Location: LLQ   Stomal Appliance 1 piece;Flat   Stoma  Assessment Pink;Moist;Protrudes   Peristomal Assessment Pink;Other (Comment)  (erosion)   Mucocutaneous Junction Intact   Treatment Barrier ring;Pouch change;Site care;Stoma powder;Liquid skin barrier;Other (Comment)  (1pc pouch, elastic barrier strips)   Stool Appearance Watery   Stool Color Green   Stool Amount Small   Output (mL) 25 ml   Wound 12/28/23 Abdomen Lower;Medial fistula   Date First Assessed/Time First Assessed: 12/28/23 0912   Present on Original Admission: Yes  Primary Wound Type: Surgical Type  Location: Abdomen  Wound Location Orientation: Lower;Medial  Wound Description (Comments): fistula   Wound Image    Wound Etiology Other  (EC fistula)   Dressing Status New dressing applied   Wound Cleansed Cleansed with  saline   Dressing/Treatment Fistula pouch   Wound Length (cm) 1 cm   Wound Width (cm) 1 cm   Wound Depth (cm)   (unable to determine)   Wound Surface Area (cm^2) 1 cm^2   Wound Assessment Pink/red   Drainage Amount Moderate (25-50%)   Drainage Description Green;Yellow;Other (Comment)  (GI content)   Odor None   Nadia-wound Assessment Erosion       **Informed Consent**    The patient has given verbal consent to have photos taken of wound and inserted into their chart as part of their permanent medical record for purposes of documentation, treatment management and/or medical review.   All Images taken on 8/20/24 of patient name: Maryam Santana were transmitted and stored on secured Epic  Site located within Media Folder Tab by a registered Epic-[employer] Mobile Application Device.     Plan: Fistula pouch to fistula  Comfort glide  Wedges  Heel protectors  Low air loss bed  Chair waffle cushion  Dietary consult  Patient will need continued preventative care  Change bag, will follow.     Herlinda Garcia RN 8/20/2024 3:50 PM

## 2024-08-20 NOTE — PROGRESS NOTES
4 Eyes Skin Assessment     NAME:  Maryma Santana  YOB: 1965  MEDICAL RECORD NUMBER:  [id number]    The patient is being assessed for  Admission    I agree that at least one RN has performed a thorough Head to Toe Skin Assessment on the patient. ALL assessment sites listed below have been assessed.      Areas assessed by both nurses:    Head, Face, Ears, Shoulders, Back, Chest, Arms, Elbows, Hands, Sacrum. Buttock, Coccyx, Ischium, Legs. Feet and Heels, and Under Medical Devices         Does the Patient have a Wound? No noted wound(s)       Tato Prevention initiated by RN: Yes  Wound Care Orders initiated by RN: No- placed by ER nurse    Pressure Injury (Stage 3,4, Unstageable, DTI, NWPT, and Complex wounds) if present, place Wound referral order by RN under : No    New Ostomies, if present place, Ostomy referral order under : No - placed by ER nurse    Nurse 1 eSignature: Electronically signed by Dayami Mares RN on 8/20/24 at 1:10 AM EDT    **SHARE this note so that the co-signing nurse can place an eSignature**    Nurse 2 eSignature: Electronically signed by Danielle Abbasi RN on 8/20/24 at 1:10 AM EDT

## 2024-08-20 NOTE — H&P
Lyrica [pregabalin], Phenergan [promethazine hcl], and Phenytoin sodium extended    Social History:   TOBACCO:   reports that she has been smoking cigarettes. She does not have any smokeless tobacco history on file.  ETOH:   reports no history of alcohol use.    Family History:       Problem Relation Age of Onset    Crohn's Disease Sister       Or deferred/otherwise considered non contributory to current admission  PHYSICAL EXAM:    VS: /60   Pulse 60   Temp 98.2 °F (36.8 °C) (Oral)   Resp 16   Ht 1.549 m (5' 1\")   Wt 68 kg (150 lb)   SpO2 95%   BMI 28.34 kg/m²     General Appearance:     ++10/10 pain  severe acute distress.   Psych:  HEENT:    A.O. As per HPI details  NC/AT, PERRL, no pallor no icterus, lips/ext mucous membrane grossly dry    Neck:   Supple, trachea midline, no obvious JVD   Resp:     Dec BS bases No wheezes, No rhonchi   Chest wall:    No tenderness or deformity   Heart:    Regular rate and rhythm, S1 and S2 normal, no rub or gallop.   Abdomen:     Soft, non-tender, bowel sounds active    +Peg- L sided colostomy bag  Periumbilical wound +erythema +tender diffuse    Draining yellow/green fluid     Genitalia & Rectal:    Deferred.   Extremities x4:   Extremities normal, atraumatic, no cyanosis, no clubbing   Musculoskeletal:      NO active synovitis or swollen b/l wrists, 2-5 MCPs examined   Skin:   Skin color, texture, turgor fairly dry, no ACUTE rashes in lower legs and arms examined       Neurologic:  .Grossly symmetric  strength in UEs and LEs with symmetric grossly intact to light touch sensation     LABS:  CBC:   Lab Results   Component Value Date/Time    WBC 21.4 08/19/2024 11:35 AM    RBC 4.23 08/19/2024 11:35 AM    HGB 12.5 08/19/2024 11:35 AM    HCT 36.8 08/19/2024 11:35 AM     08/19/2024 11:35 AM    MCV 87.0 08/19/2024 11:35 AM     BMP:    Lab Results   Component Value Date/Time     08/19/2024 11:35 AM    K 2.9 08/19/2024 11:35 AM    CL 84 08/19/2024 11:35 AM     +crohns and enterocutaneous fistula   Abx started  Gen surg consulted- regarding fistula  - bc of severe distress  Volume contraction- low BP -- she is very upset been in ER all day and they wouldn't give IV opiods-- frustrated and lashing out  Will give low dose opiods, IV fluid bolus +midrodine one dose  then progress- higher risk/high dose opiods  Antiemetics    Per CT:  \"Presence of 2 fistula tract relate with group of small bowel segments which had anastomotic areas, located proximal to the ileostomy loop.  The fistula opening inferior to the site of the ileostomy 1 in the midline in the umbilical region and the other in the left paraumbilical area.\"    Stool for fecal calprotection/stool WBC  Solumedrol 40mg IV x1  Procal,crp,esr- GI vs gen surg opinion if crohns active    ID consult  +in march Hx of PICC bacteremia in past   Last admission in March by our group: sent in +blood cx  Sp  on TPN treatment since May 2023, following abdominal surgery and development of enterocutaneous fistulas.   + pain, redness, and swelling around her PICC insertion site in her right upper arm.  w positive for Rhizobium Radiobacter.  back in March  Noted  +history of CLABSI in December, tip culture was positive for Klebsiella and MSSA.       Addendum:  Bc of recurrent low BP, concerns of high output - GI stool loss vs sepsis/septic shock- bolusing more fluids, midodrine prn- pressors if need-- already given vanco/zosyn will redose zosyn/vanco overnight, then Abx per ID  Await gen surg recommendations    Alex Mota MD   Night Officer, overnight admitting doctor at [facility]--please call day time doctor   for questions after 7:30am    Covering for [facility] Hospitalist Service  If Qs please call [phone]  Electronically signed by Alex Mota MD on 8/19/2024 at 8:03 PM

## 2024-08-20 NOTE — CARE COORDINATION
Introduced my self and provided explanation of CM role to patient.  Patient is awake, alert, and aware of current diagnosis and treatment plan including general surgery and ID consults.  She voices she resides at home with her significant other and completes most of her adl's with independence. She does state she gets assistance donning pants.  Patient has walker, w/c, cane - was not using. She self performs TPN administration, formula provided by [employer].  She visits [facility] site in [place] for weekly line check. Luis with [facility] aware of patient's admission.  A resumption of TPN order will be needed at time of discharge.  Patient verbalizes no skilled nursing agencies service her home geographic area.  Patient is established with a pcp and denies any issue with retail pharmaceutical coverage.  Patient will need followed and assisted with discharge planning as necessary.   Await ID rounding, plan of care.  Tal Flores, MSN RN  [facility] Case Management  [phone]

## 2024-08-20 NOTE — PROGRESS NOTES
[facility] [place] Hospitalist Progress Note    Admitting Date and Time: 8/19/2024 10:53 AM  Admit Dx: Hypokalemia [E87.6]  Lactic acidosis [E87.20]  NEGRO (acute kidney injury) (HCC) [N17.9]  Sepsis (HCC) [A41.9]  Sepsis, due to unspecified organism, unspecified whether acute organ dysfunction present (HCC) [A41.9]    Subjective:  Patient is being followed for Hypokalemia [E87.6]  Lactic acidosis [E87.20]  NEGRO (acute kidney injury) (HCC) [N17.9]  Sepsis (HCC) [A41.9]  Sepsis, due to unspecified organism, unspecified whether acute organ dysfunction present (HCC) [A41.9]   Pt feels okay  Per RN: no additional concerns    ROS: denies fever, chills, cp, sob, n/v, HA unless otherwise noted above     vancomycin  20 mg/kg IntraVENous Once    aluminum sulfate-calcium acetate  1 packet Topical 4x Daily    menthol-zinc oxide   Topical 4x Daily    sodium chloride flush  5-40 mL IntraVENous 2 times per day    enoxaparin  40 mg SubCUTAneous Daily    levothyroxine  75 mcg Oral Daily    loperamide  2 mg Oral 4x Daily    sertraline  50 mg Oral Daily    traZODone  100 mg Oral Nightly    pantoprazole  40 mg IntraVENous BID    ipratropium 0.5 mg-albuterol 2.5 mg  1 Dose Inhalation Q4H WA RT     white petrolatum, , BID PRN  sodium chloride flush, 5-40 mL, PRN  sodium chloride, , PRN  potassium chloride, 40 mEq, PRN   Or  potassium alternative oral replacement, 40 mEq, PRN   Or  potassium chloride, 10 mEq, PRN  magnesium sulfate, 2,000 mg, PRN  ondansetron, 4 mg, Q8H PRN   Or  ondansetron, 4 mg, Q6H PRN  polyethylene glycol, 17 g, Daily PRN  acetaminophen, 650 mg, Q6H PRN   Or  acetaminophen, 650 mg, Q6H PRN  morphine, 2 mg, Q2H PRN   Or  morphine, 4 mg, Q2H PRN  diphenhydrAMINE, 25 mg, Q8H PRN         Objective:  BP (!) 95/50   Pulse 52   Temp 98 °F (36.7 °C) (Oral)   Resp 20   Ht 1.549 m (5' 1\")   Wt 65 kg (143 lb 4.8 oz)   SpO2 94%   BMI 27.08 kg/m²     General Appearance: alert and oriented to person, place and time and in  Problem:    Sepsis (HCC)  Active Problems:    Crohn's disease (HCC)    Enterocutaneous fistula    Type 2 diabetes mellitus, without long-term current use of insulin (HCC)    Lactic acidosis  Resolved Problems:    * No resolved hospital problems. *      Plan:  Severe sepsis, lactic acidosis- fever, tachycardia, possible infection present on arrival, with HoTN. S/p vancpo+zosyn, ID c/s and adjust as indicated, RVP negative, follow BCx. Does report having an abscess to superior fistula site which ruptured 1w prior to admit  Crohn's w/ enterocutaneous fistulas- copious yellow drainage/stool, excoriations to surrounding skin of umbilicus, wound care following, cont barrier cream. Gen Surg c/s, appreciate input. Possible Crohn's flare?, cont steroids for now  Colostomy- since bowel perforation during hysterectomy. Currently with decreased stool output to ostomy, c/f leaking at fistula sites  HTN- persistent HoTN since admission, hold home meds, cont IVF w/ prn boluses, suspect d/t volume contraction/ high output/ GI losses  Asthma- not in acute exac, cont nebs/inhalers  Anxiety/depression- cont meds  Hypothyroid- cont meds  DM2- not on home orals, SSI for now    -patient still with limited ostomy output, does have persistent drainage from superior fistula site. Notes she had abscess to the surrounding tissue which ruptured approx 1 week ago with bloody/purulent drainage and since that time has had persistent drainage from fistula site, appreciate Gen Surg and ID input    Dispo: pending    NOTE: Portions of this report may have been transcribed using voice recognition software. Every effort was made to ensure accuracy; however, inadvertent computerized transcription errors may be present.  Electronically signed by Duncan Love MD on 8/20/2024 at 8:09 AM

## 2024-08-20 NOTE — RT PROTOCOL NOTE
RT Protocol     History Pulmonary Disease None or smoker <15 pack years   Respiratory pattern Regular pattern and RR 12-20 bpm   Breath sounds Clear breath sounds   Cough Strong, spontaneous, non-productive   Indications for Bronchodilator Therapy None   Bronchodilator Assessment Score 0

## 2024-08-21 LAB
ALBUMIN SERPL-MCNC: 3.4 G/DL (ref 3.5–5.2)
ALP SERPL-CCNC: 83 U/L (ref 35–104)
ALT SERPL-CCNC: 15 U/L (ref 0–32)
ANION GAP SERPL CALCULATED.3IONS-SCNC: 6 MMOL/L (ref 7–16)
AST SERPL-CCNC: 26 U/L (ref 0–31)
BASOPHILS # BLD: 0.02 K/UL (ref 0–0.2)
BASOPHILS NFR BLD: 0 % (ref 0–2)
BILIRUB DIRECT SERPL-MCNC: <0.2 MG/DL (ref 0–0.3)
BILIRUB INDIRECT SERPL-MCNC: ABNORMAL MG/DL (ref 0–1)
BILIRUB SERPL-MCNC: 0.2 MG/DL (ref 0–1.2)
BUN SERPL-MCNC: 29 MG/DL (ref 6–20)
CALCIUM SERPL-MCNC: 9.4 MG/DL (ref 8.6–10.2)
CHLORIDE SERPL-SCNC: 104 MMOL/L (ref 98–107)
CO2 SERPL-SCNC: 30 MMOL/L (ref 22–29)
CREAT SERPL-MCNC: 1.2 MG/DL (ref 0.5–1)
EOSINOPHIL # BLD: 0.11 K/UL (ref 0.05–0.5)
EOSINOPHILS RELATIVE PERCENT: 1 % (ref 0–6)
ERYTHROCYTE [DISTWIDTH] IN BLOOD BY AUTOMATED COUNT: 13.8 % (ref 11.5–15)
GFR, ESTIMATED: 54 ML/MIN/1.73M2
GLUCOSE BLD-MCNC: 107 MG/DL (ref 74–99)
GLUCOSE BLD-MCNC: 110 MG/DL (ref 74–99)
GLUCOSE BLD-MCNC: 123 MG/DL (ref 74–99)
GLUCOSE BLD-MCNC: 130 MG/DL (ref 74–99)
GLUCOSE SERPL-MCNC: 126 MG/DL (ref 74–99)
HCT VFR BLD AUTO: 34.7 % (ref 34–48)
HGB BLD-MCNC: 10.9 G/DL (ref 11.5–15.5)
IMM GRANULOCYTES # BLD AUTO: 0.03 K/UL (ref 0–0.58)
IMM GRANULOCYTES NFR BLD: 0 % (ref 0–5)
LYMPHOCYTES NFR BLD: 2.91 K/UL (ref 1.5–4)
LYMPHOCYTES RELATIVE PERCENT: 37 % (ref 20–42)
MAGNESIUM SERPL-MCNC: 2.2 MG/DL (ref 1.6–2.6)
MCH RBC QN AUTO: 29.6 PG (ref 26–35)
MCHC RBC AUTO-ENTMCNC: 31.4 G/DL (ref 32–34.5)
MCV RBC AUTO: 94.3 FL (ref 80–99.9)
MICROORGANISM/AGENT SPEC: NORMAL
MONOCYTES NFR BLD: 0.45 K/UL (ref 0.1–0.95)
MONOCYTES NFR BLD: 6 % (ref 2–12)
NEUTROPHILS NFR BLD: 55 % (ref 43–80)
NEUTS SEG NFR BLD: 4.37 K/UL (ref 1.8–7.3)
PHOSPHATE SERPL-MCNC: 2.2 MG/DL (ref 2.5–4.5)
PLATELET # BLD AUTO: 225 K/UL (ref 130–450)
PMV BLD AUTO: 9.5 FL (ref 7–12)
POTASSIUM SERPL-SCNC: 3.3 MMOL/L (ref 3.5–5)
PROT SERPL-MCNC: 6.8 G/DL (ref 6.4–8.3)
RBC # BLD AUTO: 3.68 M/UL (ref 3.5–5.5)
SODIUM SERPL-SCNC: 140 MMOL/L (ref 132–146)
SPECIMEN DESCRIPTION: NORMAL
TRIGL SERPL-MCNC: 170 MG/DL
WBC OTHER # BLD: 7.9 K/UL (ref 4.5–11.5)

## 2024-08-21 PROCEDURE — 6360000002 HC RX W HCPCS: Performed by: INTERNAL MEDICINE

## 2024-08-21 PROCEDURE — 2500000003 HC RX 250 WO HCPCS: Performed by: STUDENT IN AN ORGANIZED HEALTH CARE EDUCATION/TRAINING PROGRAM

## 2024-08-21 PROCEDURE — 36415 COLL VENOUS BLD VENIPUNCTURE: CPT

## 2024-08-21 PROCEDURE — 82248 BILIRUBIN DIRECT: CPT

## 2024-08-21 PROCEDURE — 2580000003 HC RX 258: Performed by: STUDENT IN AN ORGANIZED HEALTH CARE EDUCATION/TRAINING PROGRAM

## 2024-08-21 PROCEDURE — 99232 SBSQ HOSP IP/OBS MODERATE 35: CPT | Performed by: STUDENT IN AN ORGANIZED HEALTH CARE EDUCATION/TRAINING PROGRAM

## 2024-08-21 PROCEDURE — 6360000002 HC RX W HCPCS

## 2024-08-21 PROCEDURE — 83735 ASSAY OF MAGNESIUM: CPT

## 2024-08-21 PROCEDURE — 84100 ASSAY OF PHOSPHORUS: CPT

## 2024-08-21 PROCEDURE — 6370000000 HC RX 637 (ALT 250 FOR IP): Performed by: INTERNAL MEDICINE

## 2024-08-21 PROCEDURE — 6360000002 HC RX W HCPCS: Performed by: STUDENT IN AN ORGANIZED HEALTH CARE EDUCATION/TRAINING PROGRAM

## 2024-08-21 PROCEDURE — 2580000003 HC RX 258

## 2024-08-21 PROCEDURE — 84478 ASSAY OF TRIGLYCERIDES: CPT

## 2024-08-21 PROCEDURE — 85025 COMPLETE CBC W/AUTO DIFF WBC: CPT

## 2024-08-21 PROCEDURE — 6370000000 HC RX 637 (ALT 250 FOR IP): Performed by: STUDENT IN AN ORGANIZED HEALTH CARE EDUCATION/TRAINING PROGRAM

## 2024-08-21 PROCEDURE — 2500000003 HC RX 250 WO HCPCS

## 2024-08-21 PROCEDURE — 80053 COMPREHEN METABOLIC PANEL: CPT

## 2024-08-21 PROCEDURE — 82962 GLUCOSE BLOOD TEST: CPT

## 2024-08-21 PROCEDURE — 6360000002 HC RX W HCPCS: Performed by: SPECIALIST

## 2024-08-21 PROCEDURE — 2060000000 HC ICU INTERMEDIATE R&B

## 2024-08-21 PROCEDURE — 2580000003 HC RX 258: Performed by: SPECIALIST

## 2024-08-21 RX ADMIN — LOPERAMIDE HYDROCHLORIDE 2 MG: 2 CAPSULE ORAL at 13:29

## 2024-08-21 RX ADMIN — LOPERAMIDE HYDROCHLORIDE 2 MG: 2 CAPSULE ORAL at 07:56

## 2024-08-21 RX ADMIN — SOYBEAN OIL 250 ML: 20 INJECTION, SOLUTION INTRAVENOUS at 18:04

## 2024-08-21 RX ADMIN — LOPERAMIDE HYDROCHLORIDE 2 MG: 2 CAPSULE ORAL at 15:54

## 2024-08-21 RX ADMIN — ANORECTAL OINTMENT: 15.7; .44; 24; 20.6 OINTMENT TOPICAL at 07:57

## 2024-08-21 RX ADMIN — MICONAZOLE NITRATE: 2 POWDER TOPICAL at 00:29

## 2024-08-21 RX ADMIN — MICONAZOLE NITRATE: 2 POWDER TOPICAL at 20:59

## 2024-08-21 RX ADMIN — ANORECTAL OINTMENT: 15.7; .44; 24; 20.6 OINTMENT TOPICAL at 15:55

## 2024-08-21 RX ADMIN — LEVOTHYROXINE SODIUM 75 MCG: 75 TABLET ORAL at 05:08

## 2024-08-21 RX ADMIN — CEFEPIME 2000 MG: 2 INJECTION, POWDER, FOR SOLUTION INTRAVENOUS at 11:51

## 2024-08-21 RX ADMIN — FLUCONAZOLE 400 MG: 400 INJECTION, SOLUTION INTRAVENOUS at 09:57

## 2024-08-21 RX ADMIN — SODIUM CHLORIDE, PRESERVATIVE FREE 10 ML: 5 INJECTION INTRAVENOUS at 21:00

## 2024-08-21 RX ADMIN — MORPHINE SULFATE 2 MG: 2 INJECTION, SOLUTION INTRAMUSCULAR; INTRAVENOUS at 14:55

## 2024-08-21 RX ADMIN — TRAZODONE HYDROCHLORIDE 100 MG: 50 TABLET ORAL at 20:53

## 2024-08-21 RX ADMIN — POTASSIUM CHLORIDE 40 MEQ: 1500 TABLET, EXTENDED RELEASE ORAL at 10:16

## 2024-08-21 RX ADMIN — VANCOMYCIN HYDROCHLORIDE 1250 MG: 10 INJECTION, POWDER, LYOPHILIZED, FOR SOLUTION INTRAVENOUS at 14:59

## 2024-08-21 RX ADMIN — LOPERAMIDE HYDROCHLORIDE 2 MG: 2 CAPSULE ORAL at 20:53

## 2024-08-21 RX ADMIN — SODIUM CHLORIDE, PRESERVATIVE FREE 10 ML: 5 INJECTION INTRAVENOUS at 07:59

## 2024-08-21 RX ADMIN — MORPHINE SULFATE 2 MG: 2 INJECTION, SOLUTION INTRAMUSCULAR; INTRAVENOUS at 10:11

## 2024-08-21 RX ADMIN — MICONAZOLE NITRATE: 2 POWDER TOPICAL at 07:57

## 2024-08-21 RX ADMIN — MORPHINE SULFATE 4 MG: 4 INJECTION, SOLUTION INTRAMUSCULAR; INTRAVENOUS at 20:54

## 2024-08-21 RX ADMIN — PANTOPRAZOLE SODIUM 40 MG: 40 INJECTION, POWDER, FOR SOLUTION INTRAVENOUS at 07:53

## 2024-08-21 RX ADMIN — PANTOPRAZOLE SODIUM 40 MG: 40 INJECTION, POWDER, FOR SOLUTION INTRAVENOUS at 20:53

## 2024-08-21 RX ADMIN — MORPHINE SULFATE 4 MG: 4 INJECTION, SOLUTION INTRAMUSCULAR; INTRAVENOUS at 05:18

## 2024-08-21 RX ADMIN — POTASSIUM CHLORIDE: 2 INJECTION, SOLUTION, CONCENTRATE INTRAVENOUS at 18:03

## 2024-08-21 RX ADMIN — ENOXAPARIN SODIUM 40 MG: 100 INJECTION SUBCUTANEOUS at 07:55

## 2024-08-21 RX ADMIN — ANORECTAL OINTMENT: 15.7; .44; 24; 20.6 OINTMENT TOPICAL at 13:13

## 2024-08-21 RX ADMIN — SERTRALINE 50 MG: 50 TABLET, FILM COATED ORAL at 07:56

## 2024-08-21 RX ADMIN — MORPHINE SULFATE 4 MG: 4 INJECTION, SOLUTION INTRAMUSCULAR; INTRAVENOUS at 00:28

## 2024-08-21 RX ADMIN — ANORECTAL OINTMENT: 15.7; .44; 24; 20.6 OINTMENT TOPICAL at 20:53

## 2024-08-21 ASSESSMENT — PAIN - FUNCTIONAL ASSESSMENT
PAIN_FUNCTIONAL_ASSESSMENT: ACTIVITIES ARE NOT PREVENTED
PAIN_FUNCTIONAL_ASSESSMENT: ACTIVITIES ARE NOT PREVENTED
PAIN_FUNCTIONAL_ASSESSMENT: PREVENTS OR INTERFERES SOME ACTIVE ACTIVITIES AND ADLS
PAIN_FUNCTIONAL_ASSESSMENT: PREVENTS OR INTERFERES SOME ACTIVE ACTIVITIES AND ADLS

## 2024-08-21 ASSESSMENT — PAIN SCALES - GENERAL
PAINLEVEL_OUTOF10: 7
PAINLEVEL_OUTOF10: 0
PAINLEVEL_OUTOF10: 4
PAINLEVEL_OUTOF10: 6
PAINLEVEL_OUTOF10: 7
PAINLEVEL_OUTOF10: 7
PAINLEVEL_OUTOF10: 3
PAINLEVEL_OUTOF10: 1
PAINLEVEL_OUTOF10: 8
PAINLEVEL_OUTOF10: 0

## 2024-08-21 ASSESSMENT — PAIN DESCRIPTION - LOCATION
LOCATION: ABDOMEN

## 2024-08-21 ASSESSMENT — PAIN DESCRIPTION - DESCRIPTORS
DESCRIPTORS: ACHING;CRAMPING;DISCOMFORT
DESCRIPTORS: DISCOMFORT
DESCRIPTORS: DISCOMFORT
DESCRIPTORS: ACHING;DISCOMFORT
DESCRIPTORS: ACHING;DISCOMFORT

## 2024-08-21 ASSESSMENT — PAIN DESCRIPTION - ORIENTATION
ORIENTATION: MID
ORIENTATION: MID
ORIENTATION: MID;LOWER
ORIENTATION: MID;LOWER

## 2024-08-21 ASSESSMENT — PAIN SCALES - WONG BAKER: WONGBAKER_NUMERICALRESPONSE: NO HURT

## 2024-08-21 NOTE — FLOWSHEET NOTE
Inpatient Wound Care (follow up) 423     Admit Date: 8/19/2024 10:53 AM    Reason for consult:  fistula, colostomy    Patient laying down in bed, awake, alert and oriented    Past Medical History:   Diagnosis Date    Cancer (HCC)     colon    Depression     Fissure, anal     Hernia     Hypertension      Findings:     08/21/24 0915   Colostomy LLQ   Placement Date/Time: (c) 12/28/23 (c) 2300   Present on Admission/Arrival: Yes  Description (optional): colostomy  Location: LLQ   Stomal Appliance 1 piece;Flat;Clean, dry & intact   Stoma  Assessment GUILHERME   Peristomal Assessment GUILHERME   Mucocutaneous Junction   (GUILHERME)   Stool Appearance Watery   Stool Color Green   Stool Amount Small   Output (mL)   (didn't empty pouch)   Wound 12/28/23 Abdomen Lower;Medial fistula   Date First Assessed/Time First Assessed: 12/28/23 0912   Present on Original Admission: Yes  Primary Wound Type: Surgical Type  Location: Abdomen  Wound Location Orientation: Lower;Medial  Wound Description (Comments): fistula   Dressing Status Clean;Dry;Intact   Dressing/Treatment Fistula pouch   Drainage Amount Moderate (25-50%)   Drainage Description Green;Yellow  (GI content)     Fistula pouch and colostomy pouch still intact.     Plan:   Fistula pouch to fistula  Comfort glide  Wedges  Heel protectors  Low air loss bed  Chair waffle cushion  Dietary consult  Patient will need continued preventative care  Change fistula pouch colostomy pouch on Friday, assess to teach application of fistula pouch, will follow      Herlinda Garcia RN 8/21/2024 3:12 PM

## 2024-08-21 NOTE — PROGRESS NOTES
[facility] Hospitalist Progress Note    Admitting Date and Time: 8/19/2024 10:53 AM  Admit Dx: Hypokalemia [E87.6]  Lactic acidosis [E87.20]  NEGRO (acute kidney injury) (HCC) [N17.9]  Sepsis (HCC) [A41.9]  Sepsis, due to unspecified organism, unspecified whether acute organ dysfunction present (HCC) [A41.9]    Subjective:  Patient is being followed for Hypokalemia [E87.6]  Lactic acidosis [E87.20]  NEGRO (acute kidney injury) (HCC) [N17.9]  Sepsis (HCC) [A41.9]  Sepsis, due to unspecified organism, unspecified whether acute organ dysfunction present (HCC) [A41.9]   Pt feels okay  Per RN: no additional concerns    ROS: denies fever, chills, cp, sob, n/v, HA unless otherwise noted above     insulin lispro  0-4 Units SubCUTAneous TID WC    insulin lispro  0-4 Units SubCUTAneous Nightly    cefepime  2,000 mg IntraVENous Q12H    fluconazole  400 mg IntraVENous Q24H    fat emulsion  250 mL IntraVENous Q MWF    miconazole   Topical BID    aluminum sulfate-calcium acetate  1 packet Topical 4x Daily    menthol-zinc oxide   Topical 4x Daily    sodium chloride flush  5-40 mL IntraVENous 2 times per day    enoxaparin  40 mg SubCUTAneous Daily    levothyroxine  75 mcg Oral Daily    loperamide  2 mg Oral 4x Daily    sertraline  50 mg Oral Daily    traZODone  100 mg Oral Nightly    pantoprazole  40 mg IntraVENous BID     glucose, 4 tablet, PRN  dextrose bolus, 125 mL, PRN   Or  dextrose bolus, 250 mL, PRN  glucagon (rDNA), 1 mg, PRN  dextrose, , Continuous PRN  ipratropium 0.5 mg-albuterol 2.5 mg, 1 Dose, Q4H PRN  white petrolatum, , BID PRN  sodium chloride flush, 5-40 mL, PRN  sodium chloride, , PRN  potassium chloride, 40 mEq, PRN   Or  potassium alternative oral replacement, 40 mEq, PRN   Or  potassium chloride, 10 mEq, PRN  magnesium sulfate, 2,000 mg, PRN  ondansetron, 4 mg, Q8H PRN   Or  ondansetron, 4 mg, Q6H PRN  polyethylene glycol, 17 g, Daily PRN  acetaminophen, 650 mg, Q6H PRN   Or  acetaminophen, 650 mg, Q6H

## 2024-08-21 NOTE — PLAN OF CARE
Problem: ABCDS Injury Assessment  Goal: Absence of physical injury  8/20/2024 2243 by Dayami Venegas RN  Outcome: Progressing  8/20/2024 1557 by Nevaeh Guadarrama RN  Outcome: Progressing     Problem: Skin/Tissue Integrity  Goal: Absence of new skin breakdown  Description: 1.  Monitor for areas of redness and/or skin breakdown  2.  Assess vascular access sites hourly  3.  Every 4-6 hours minimum:  Change oxygen saturation probe site  4.  Every 4-6 hours:  If on nasal continuous positive airway pressure, respiratory therapy assess nares and determine need for appliance change or resting period.  8/20/2024 2243 by Dayami Venegas RN  Outcome: Progressing  8/20/2024 1557 by Nevaeh Guadarrama RN  Outcome: Progressing     Problem: Discharge Planning  Goal: Discharge to home or other facility with appropriate resources  8/20/2024 2243 by Dayami Venegas RN  Outcome: Progressing  8/20/2024 1557 by Nevaeh Guadarrama RN  Outcome: Progressing     Problem: Safety - Adult  Goal: Free from fall injury  Outcome: Progressing     Problem: Chronic Conditions and Co-morbidities  Goal: Patient's chronic conditions and co-morbidity symptoms are monitored and maintained or improved  Outcome: Progressing     Problem: Pain  Goal: Verbalizes/displays adequate comfort level or baseline comfort level  Outcome: Progressing     Problem: Nutrition Deficit:  Goal: Optimize nutritional status  8/20/2024 2243 by Dayami Venegas RN  Outcome: Progressing  8/20/2024 1136 by Abby Dorsey, FLAKITO, CNSC, LD  Flowsheets (Taken 8/20/2024 1136)  Nutrient intake appropriate for improving, restoring, or maintaining nutritional needs:   Assess nutritional status and recommend course of action   Recommend, monitor, and adjust tube feedings and TPN/PPN based on assessed needs

## 2024-08-21 NOTE — PROGRESS NOTES
[facility]  Progress Note    SUBJECTIVE:  Chief Complaint   Patient presents with    Abdominal Pain     Fistula draining large amount yellow drainage.     Fever     Patient is tolerating medications. No reported adverse drug reactions.  No nausea, vomiting, diarrhea.    Review of systems:  As stated above in the chief complaint, otherwise negative.    Medications:  Scheduled Meds:   insulin lispro  0-4 Units SubCUTAneous TID WC    insulin lispro  0-4 Units SubCUTAneous Nightly    cefepime  2,000 mg IntraVENous Q12H    fluconazole  400 mg IntraVENous Q24H    fat emulsion  250 mL IntraVENous Q MWF    miconazole   Topical BID    aluminum sulfate-calcium acetate  1 packet Topical 4x Daily    menthol-zinc oxide   Topical 4x Daily    sodium chloride flush  5-40 mL IntraVENous 2 times per day    enoxaparin  40 mg SubCUTAneous Daily    levothyroxine  75 mcg Oral Daily    loperamide  2 mg Oral 4x Daily    sertraline  50 mg Oral Daily    traZODone  100 mg Oral Nightly    pantoprazole  40 mg IntraVENous BID     Continuous Infusions:   PN-Adult 2-in-1 Central Line (Standard)      dextrose      PN-Adult 2-in-1 Central Line (Standard) 65 mL/hr at 24 1923    sodium chloride       PRN Meds:glucose, dextrose bolus **OR** dextrose bolus, glucagon (rDNA), dextrose, ipratropium 0.5 mg-albuterol 2.5 mg, white petrolatum, sodium chloride flush, sodium chloride, potassium chloride **OR** potassium alternative oral replacement **OR** potassium chloride, magnesium sulfate, ondansetron **OR** ondansetron, polyethylene glycol, acetaminophen **OR** acetaminophen, morphine **OR** morphine, diphenhydrAMINE    OBJECTIVE:  BP 96/68   Pulse 50   Temp 97.3 °F (36.3 °C) (Temporal)   Resp 18   Ht 1.549 m (5' 1\")   Wt 66.8 kg (147 lb 4.8 oz)   SpO2 100%   BMI 27.83 kg/m²   Temp  Av.5 °F (36.4 °C)  Min: 97.3 °F (36.3 °C)  Max: 97.9 °F (36.6 °C)  Constitutional: The patient is awake, alert,

## 2024-08-21 NOTE — CONSULTS
HPI:   Ms. Maryam Santana is a 59 y.o. female w/ PMH of  has a past medical history of Cancer (HCC), Depression, Fissure, anal, Hernia, and Hypertension. who presents to the ED      Vitals:    08/21/24 0415 08/21/24 0518 08/21/24 0548 08/21/24 0745   BP: 97/68   96/68   Pulse: 51   50   Resp: 20 20 20 20   Temp: 97.5 °F (36.4 °C)   97.3 °F (36.3 °C)   TempSrc: Infrared   Temporal   SpO2: 94%   100%   Weight: 66.8 kg (147 lb 4.8 oz)      Height:            Lab Results   Component Value Date    WBC 7.9 08/21/2024    HGB 10.9 (L) 08/21/2024    HCT 34.7 08/21/2024    MCV 94.3 08/21/2024     08/21/2024      Lab Results   Component Value Date     08/21/2024    K 3.3 (L) 08/21/2024     08/21/2024    CO2 30 (H) 08/21/2024    BUN 29 (H) 08/21/2024    CREATININE 1.2 (H) 08/21/2024    GLUCOSE 126 (H) 08/21/2024    CALCIUM 9.4 08/21/2024    BILITOT 0.2 08/21/2024    ALKPHOS 83 08/21/2024    AST 26 08/21/2024    ALT 15 08/21/2024    LABGLOM 54 (L) 08/21/2024    GFRAA >60 10/06/2015     Lab Results   Component Value Date    INR 1.1 03/08/2024    INR 1.2 12/29/2023    PROTIME 12.6 (H) 03/08/2024    PROTIME 13.7 (H) 12/29/2023      MELD 3.0: 9 at 3/10/2024  6:15 AM  MELD-Na: 7 at 3/10/2024  6:15 AM  Calculated from:  Serum Creatinine: 1.0 mg/dL at 3/10/2024  6:15 AM  Serum Sodium: 136 mmol/L at 3/10/2024  6:15 AM  Total Bilirubin: 0.4 mg/dL (Using min of 1 mg/dL) at 3/10/2024  6:15 AM  Serum Albumin: 3.6 g/dL (Using max of 3.5 g/dL) at 3/10/2024  6:15 AM  INR(ratio): 1.1 at 3/8/2024  9:45 AM  Age at listing (hypothetical): 59 years  Sex: Female at 3/10/2024  6:15 AM     Lab Results   Component Value Date    CEA 1.6 12/29/2023      Lab Results   Component Value Date    SEDRATE 57 (H) 08/20/2024      Lab Results   Component Value Date    CRP 83.0 (H) 08/20/2024      Lab Results   Component Value Date    LIPASE 28 08/19/2024      No results found for: \"\"   No results found for: \"CHROMGRNA\"    US Result (most  History     Tobacco Use    Smoking status: Every Day     Current packs/day: 1.00     Types: Cigarettes   Substance Use Topics    Alcohol use: No    Drug use: No        REVIEW OF SYSTEMS: The patients’ health assessment form was reviewed.  General: Patient denies n/v/f/c or weight loss.  HEENT: Patient denies persistent postnasal drip, scleral icterus, drooling, persistent bleeding from nose/mouth.  Resp: Patient denies SOB, wheezing, productive cough.  Cardio: Patient denies CP, palpitations, significant edema  GI: As above.  Derm: Patient denies jaundice/rashes.   Misc: Patient denies diffuse/irregular joint swelling or myalgias.    PHYSICAL EXAMINATION:   Vitals:    08/21/24 0415 08/21/24 0518 08/21/24 0548 08/21/24 0745   BP: 97/68   96/68   Pulse: 51   50   Resp: 20 20 20 20   Temp: 97.5 °F (36.4 °C)   97.3 °F (36.3 °C)   TempSrc: Infrared   Temporal   SpO2: 94%   100%   Weight: 66.8 kg (147 lb 4.8 oz)      Height:         General: Overall well-appearing, NAD  HEENT: PERRLA, EOMI, Anicteric sclera, MMM, no rhinorrhea  Cards: RRR, no LE edema  Resp: Breathing comfortably on room air, good air movement, no use of accessory muscles, no audible wheezing  Abdomen: ***  Extremities: Moves all extremities, no effusions or bruising.  Skin: No rashes or jaundice  Neuro: A&O x 3, CN grossly intact, non-focal exam    PLAN:   {plan; gi bleed:76635}  {liver trans plan:6050586545}  {Plan; Thromb.:7660843047}  {Ascites plan:7663098401}  {elev. liver enz plan:36758}  {HE plan:0433074189}  {CHP ICU GI/NUTRITION PLANS:50930}  {IBD Rx:10152}  {Plan; abdominal pain:21570}

## 2024-08-21 NOTE — PROGRESS NOTES
Plan for mediport removal 8/22 with Dr. Hunt     - pt is already NPO, okay to continue TPN prior to OR   - type and screen ordered   - continue Abx    Electronically signed by Elodia Espinal DO on 8/21/2024 at 3:27 PM

## 2024-08-21 NOTE — PROGRESS NOTES
GENERAL SURGERY  DAILY PROGRESS NOTE    Patient's Name/Date of Birth: Maryam Santana / 1965    Date: 2024     Chief Complaint   Patient presents with    Abdominal Pain     Fistula draining large amount yellow drainage.     Fever        Subjective:  Pt states she is feeling okay this morning, just received pain medications. She feels much better with the ostomy bag on her fistula. States it has been emptied 3 times overnight.      Objective:  Last 24Hrs  Temp  Av.8 °F (36.6 °C)  Min: 97.3 °F (36.3 °C)  Max: 98.7 °F (37.1 °C)  Resp  Av.3  Min: 18  Max: 20  Pulse  Av.5  Min: 51  Max: 70  Systolic (24hrs), Av , Min:91 , Max:110     Diastolic (24hrs), Av, Min:53, Max:75    SpO2  Av %  Min: 92 %  Max: 97 %    I/O last 3 completed shifts:  In: 2705.2 [P.O.:360; I.V.:2000; IV Piggyback:345.2]  Out: 1475 [Urine:250; Other:100; Stool:1125]      General: resting in bed  Cardiovascular: Warm throughout, no edema  Respiratory: no respiratory distress, equal chest rise  Abdomen: Previous midline wound with surrounding skin irritation and bilious output from fistula with ostomy bag in place. Ostomy present on LUQ with output noted.   Skin: erythema and skin irritation surrounding fistula  Extremities: moving all extremities      CBC  Recent Labs     24  1135 24  0740   WBC 21.4* 9.5   RBC 4.23 3.91   HGB 12.5 11.5   HCT 36.8 35.1   MCV 87.0 89.8   MCH 29.6 29.4   MCHC 34.0 32.8   RDW 13.6 13.7    217   MPV 9.1 9.0       CMP  Recent Labs     24  1135 24  0740   * 139   K 2.9* 3.2*   CL 84* 100   CO2 30* 28   BUN 39* 28*   CREATININE 1.6* 1.3*   GLUCOSE 127* 141*   CALCIUM 9.5 9.1   BILITOT 0.6 0.5   ALKPHOS 117* 98   AST 30 31   ALT 17 17         Assessment/Plan:    Patient Active Problem List   Diagnosis    Ventral hernia with bowel obstruction    Crohn's disease (HCC)    History of colon cancer    Depression    Crohn's disease of small intestine with

## 2024-08-21 NOTE — PROGRESS NOTES
Pharmacy Consultation Note  (Antibiotic Dosing and Monitoring)    Initial consult date: 8/21  Consulting physician/provider: Dmitri  Drug: Vancomycin  Indication: Bloodstream infection    Age/  Gender Height Weight IBW  Allergy Information   59 y.o./female 154.9 cm (5' 1\") 68 kg (150 lb)     Ideal body weight: 47.8 kg (105 lb 6.1 oz)  Adjusted ideal body weight: 55.4 kg (122 lb 2.4 oz)   Dilaudid [hydromorphone hcl], Cocoa, Lactose, Lyrica [pregabalin], Phenergan [promethazine hcl], and Phenytoin sodium extended      Renal Function:  Recent Labs     08/19/24  1135 08/20/24  0740 08/21/24  0747   BUN 39* 28* 29*   CREATININE 1.6* 1.3* 1.2*       Intake/Output Summary (Last 24 hours) at 8/21/2024 1328  Last data filed at 8/21/2024 1154  Gross per 24 hour   Intake 180 ml   Output 500 ml   Net -320 ml       Vancomycin Monitoring:  Trough:  No results for input(s): \"VANCOTROUGH\" in the last 72 hours.  Random:  No results for input(s): \"VANCORANDOM\" in the last 72 hours.      Assessment:  Patient is a 59 y.o. female who has been initiated on vancomycin  Estimated Creatinine Clearance: 45 mL/min (A) (based on SCr of 1.2 mg/dL (H)).  No history of vancomycin levels in EPIC  Patient received vancomycin 1250 mg IV x 1 on 8/19 (1457)  Patient received no vancomycin on 8/20.    Plan:  Will order vancomycin 1250 mg IV x 1 now and place standing order for vancomycin 1250 mg IV q24h to begin on 8/22 at 0800 for projected AUC/JOSSE 400-600.  Will check vancomycin levels when appropriate  Will continue to monitor renal function   Pharmacy to follow      Jonah Mart RPH 8/21/2024 1:28 PM    ALTAGRACIA: [phone]  SEY: [phone]  SJW: [phone]

## 2024-08-22 ENCOUNTER — ANESTHESIA (OUTPATIENT)
Dept: OPERATING ROOM | Age: 59
End: 2024-08-22
Payer: MEDICARE

## 2024-08-22 ENCOUNTER — ANESTHESIA EVENT (OUTPATIENT)
Dept: OPERATING ROOM | Age: 59
End: 2024-08-22
Payer: MEDICARE

## 2024-08-22 LAB
ABO + RH BLD: NORMAL
ACB COMPLEX DNA BLD POS QL NAA+NON-PROBE: NOT DETECTED
ALBUMIN SERPL-MCNC: 3.2 G/DL (ref 3.5–5.2)
ALP SERPL-CCNC: 83 U/L (ref 35–104)
ALT SERPL-CCNC: 14 U/L (ref 0–32)
ANION GAP SERPL CALCULATED.3IONS-SCNC: 11 MMOL/L (ref 7–16)
ARM BAND NUMBER: NORMAL
AST SERPL-CCNC: 22 U/L (ref 0–31)
B FRAGILIS DNA BLD POS QL NAA+NON-PROBE: NOT DETECTED
BASOPHILS # BLD: 0.03 K/UL (ref 0–0.2)
BASOPHILS NFR BLD: 1 % (ref 0–2)
BILIRUB DIRECT SERPL-MCNC: <0.2 MG/DL (ref 0–0.3)
BILIRUB INDIRECT SERPL-MCNC: ABNORMAL MG/DL (ref 0–1)
BILIRUB SERPL-MCNC: 0.2 MG/DL (ref 0–1.2)
BIOFIRE TEST COMMENT: ABNORMAL
BLOOD BANK SAMPLE EXPIRATION: NORMAL
BLOOD GROUP ANTIBODIES SERPL: NEGATIVE
BUN SERPL-MCNC: 23 MG/DL (ref 6–20)
C ALBICANS DNA BLD POS QL NAA+NON-PROBE: NOT DETECTED
C AURIS DNA BLD POS QL NAA+NON-PROBE: NOT DETECTED
C GATTII+NEOFOR DNA BLD POS QL NAA+N-PRB: NOT DETECTED
C GLABRATA DNA BLD POS QL NAA+NON-PROBE: NOT DETECTED
C KRUSEI DNA BLD POS QL NAA+NON-PROBE: NOT DETECTED
C PARAP DNA BLD POS QL NAA+NON-PROBE: NOT DETECTED
C TROPICLS DNA BLD POS QL NAA+NON-PROBE: NOT DETECTED
CALCIUM SERPL-MCNC: 9 MG/DL (ref 8.6–10.2)
CHLORIDE SERPL-SCNC: 107 MMOL/L (ref 98–107)
CO2 SERPL-SCNC: 23 MMOL/L (ref 22–29)
CREAT SERPL-MCNC: 1 MG/DL (ref 0.5–1)
E CLOAC COMP DNA BLD POS NAA+NON-PROBE: NOT DETECTED
E COLI DNA BLD POS QL NAA+NON-PROBE: NOT DETECTED
E FAECALIS DNA BLD POS QL NAA+NON-PROBE: NOT DETECTED
E FAECIUM DNA BLD POS QL NAA+NON-PROBE: NOT DETECTED
ENTEROBACTERALES DNA BLD POS NAA+N-PRB: NOT DETECTED
EOSINOPHIL # BLD: 0.21 K/UL (ref 0.05–0.5)
EOSINOPHILS RELATIVE PERCENT: 3 % (ref 0–6)
ERYTHROCYTE [DISTWIDTH] IN BLOOD BY AUTOMATED COUNT: 13.8 % (ref 11.5–15)
GFR, ESTIMATED: 65 ML/MIN/1.73M2
GLUCOSE BLD-MCNC: 103 MG/DL (ref 74–99)
GLUCOSE BLD-MCNC: 94 MG/DL (ref 74–99)
GLUCOSE SERPL-MCNC: 114 MG/DL (ref 74–99)
GP B STREP DNA BLD POS QL NAA+NON-PROBE: NOT DETECTED
HAEM INFLU DNA BLD POS QL NAA+NON-PROBE: NOT DETECTED
HCT VFR BLD AUTO: 34.5 % (ref 34–48)
HGB BLD-MCNC: 11.4 G/DL (ref 11.5–15.5)
IMM GRANULOCYTES # BLD AUTO: <0.03 K/UL (ref 0–0.58)
IMM GRANULOCYTES NFR BLD: 0 % (ref 0–5)
K OXYTOCA DNA BLD POS QL NAA+NON-PROBE: NOT DETECTED
KLEBSIELLA SP DNA BLD POS QL NAA+NON-PRB: NOT DETECTED
KLEBSIELLA SP DNA BLD POS QL NAA+NON-PRB: NOT DETECTED
L MONOCYTOG DNA BLD POS QL NAA+NON-PROBE: NOT DETECTED
LYMPHOCYTES NFR BLD: 2.7 K/UL (ref 1.5–4)
LYMPHOCYTES RELATIVE PERCENT: 43 % (ref 20–42)
MAGNESIUM SERPL-MCNC: 1.8 MG/DL (ref 1.6–2.6)
MCH RBC QN AUTO: 29.7 PG (ref 26–35)
MCHC RBC AUTO-ENTMCNC: 33 G/DL (ref 32–34.5)
MCV RBC AUTO: 89.8 FL (ref 80–99.9)
MECA+MECC ISLT/SPM QL: DETECTED
MICROORGANISM SPEC CULT: ABNORMAL
MICROORGANISM/AGENT SPEC: ABNORMAL
MONOCYTES NFR BLD: 0.35 K/UL (ref 0.1–0.95)
MONOCYTES NFR BLD: 6 % (ref 2–12)
N MEN DNA BLD POS QL NAA+NON-PROBE: NOT DETECTED
NEUTROPHILS NFR BLD: 48 % (ref 43–80)
NEUTS SEG NFR BLD: 3.03 K/UL (ref 1.8–7.3)
P AERUGINOSA DNA BLD POS NAA+NON-PROBE: NOT DETECTED
PHOSPHATE SERPL-MCNC: 2.7 MG/DL (ref 2.5–4.5)
PLATELET # BLD AUTO: 226 K/UL (ref 130–450)
PMV BLD AUTO: 8.9 FL (ref 7–12)
POTASSIUM SERPL-SCNC: 3.7 MMOL/L (ref 3.5–5)
PROT SERPL-MCNC: 6.6 G/DL (ref 6.4–8.3)
PROTEUS SP DNA BLD POS QL NAA+NON-PROBE: NOT DETECTED
RBC # BLD AUTO: 3.84 M/UL (ref 3.5–5.5)
S AUREUS DNA BLD POS QL NAA+NON-PROBE: NOT DETECTED
S AUREUS+CONS DNA BLD POS NAA+NON-PROBE: DETECTED
S EPIDERMIDIS DNA BLD POS QL NAA+NON-PRB: DETECTED
S LUGDUNENSIS DNA BLD POS QL NAA+NON-PRB: NOT DETECTED
S MALTOPHILIA DNA BLD POS QL NAA+NON-PRB: NOT DETECTED
S MARCESCENS DNA BLD POS NAA+NON-PROBE: NOT DETECTED
S PNEUM DNA BLD POS QL NAA+NON-PROBE: NOT DETECTED
S PYO DNA BLD POS QL NAA+NON-PROBE: NOT DETECTED
SALMONELLA DNA BLD POS QL NAA+NON-PROBE: NOT DETECTED
SERVICE CMNT-IMP: ABNORMAL
SODIUM SERPL-SCNC: 141 MMOL/L (ref 132–146)
SPECIMEN DESCRIPTION: ABNORMAL
STREPTOCOCCUS DNA BLD POS NAA+NON-PROBE: NOT DETECTED
WBC OTHER # BLD: 6.3 K/UL (ref 4.5–11.5)

## 2024-08-22 PROCEDURE — 36415 COLL VENOUS BLD VENIPUNCTURE: CPT

## 2024-08-22 PROCEDURE — 6370000000 HC RX 637 (ALT 250 FOR IP): Performed by: INTERNAL MEDICINE

## 2024-08-22 PROCEDURE — 3700000001 HC ADD 15 MINUTES (ANESTHESIA): Performed by: STUDENT IN AN ORGANIZED HEALTH CARE EDUCATION/TRAINING PROGRAM

## 2024-08-22 PROCEDURE — 82248 BILIRUBIN DIRECT: CPT

## 2024-08-22 PROCEDURE — 86901 BLOOD TYPING SEROLOGIC RH(D): CPT

## 2024-08-22 PROCEDURE — 2580000003 HC RX 258: Performed by: SPECIALIST

## 2024-08-22 PROCEDURE — 99232 SBSQ HOSP IP/OBS MODERATE 35: CPT | Performed by: INTERNAL MEDICINE

## 2024-08-22 PROCEDURE — 82962 GLUCOSE BLOOD TEST: CPT

## 2024-08-22 PROCEDURE — 6360000002 HC RX W HCPCS: Performed by: STUDENT IN AN ORGANIZED HEALTH CARE EDUCATION/TRAINING PROGRAM

## 2024-08-22 PROCEDURE — 6360000002 HC RX W HCPCS: Performed by: NURSE ANESTHETIST, CERTIFIED REGISTERED

## 2024-08-22 PROCEDURE — 36590 REMOVAL TUNNELED CV CATH: CPT

## 2024-08-22 PROCEDURE — 2580000003 HC RX 258: Performed by: NURSE ANESTHETIST, CERTIFIED REGISTERED

## 2024-08-22 PROCEDURE — 87077 CULTURE AEROBIC IDENTIFY: CPT

## 2024-08-22 PROCEDURE — 6360000002 HC RX W HCPCS: Performed by: INTERNAL MEDICINE

## 2024-08-22 PROCEDURE — 85025 COMPLETE CBC W/AUTO DIFF WBC: CPT

## 2024-08-22 PROCEDURE — 87071 CULTURE AEROBIC QUANT OTHER: CPT

## 2024-08-22 PROCEDURE — 83735 ASSAY OF MAGNESIUM: CPT

## 2024-08-22 PROCEDURE — 3700000000 HC ANESTHESIA ATTENDED CARE: Performed by: STUDENT IN AN ORGANIZED HEALTH CARE EDUCATION/TRAINING PROGRAM

## 2024-08-22 PROCEDURE — 6360000002 HC RX W HCPCS: Performed by: SPECIALIST

## 2024-08-22 PROCEDURE — 86850 RBC ANTIBODY SCREEN: CPT

## 2024-08-22 PROCEDURE — 84100 ASSAY OF PHOSPHORUS: CPT

## 2024-08-22 PROCEDURE — 3600000012 HC SURGERY LEVEL 2 ADDTL 15MIN: Performed by: STUDENT IN AN ORGANIZED HEALTH CARE EDUCATION/TRAINING PROGRAM

## 2024-08-22 PROCEDURE — 2060000000 HC ICU INTERMEDIATE R&B

## 2024-08-22 PROCEDURE — 2580000003 HC RX 258: Performed by: STUDENT IN AN ORGANIZED HEALTH CARE EDUCATION/TRAINING PROGRAM

## 2024-08-22 PROCEDURE — 2500000003 HC RX 250 WO HCPCS: Performed by: STUDENT IN AN ORGANIZED HEALTH CARE EDUCATION/TRAINING PROGRAM

## 2024-08-22 PROCEDURE — 80053 COMPREHEN METABOLIC PANEL: CPT

## 2024-08-22 PROCEDURE — 7100000011 HC PHASE II RECOVERY - ADDTL 15 MIN: Performed by: STUDENT IN AN ORGANIZED HEALTH CARE EDUCATION/TRAINING PROGRAM

## 2024-08-22 PROCEDURE — 7100000010 HC PHASE II RECOVERY - FIRST 15 MIN: Performed by: STUDENT IN AN ORGANIZED HEALTH CARE EDUCATION/TRAINING PROGRAM

## 2024-08-22 PROCEDURE — 1200000000 HC SEMI PRIVATE

## 2024-08-22 PROCEDURE — 2500000003 HC RX 250 WO HCPCS: Performed by: NURSE ANESTHETIST, CERTIFIED REGISTERED

## 2024-08-22 PROCEDURE — 2709999900 HC NON-CHARGEABLE SUPPLY: Performed by: STUDENT IN AN ORGANIZED HEALTH CARE EDUCATION/TRAINING PROGRAM

## 2024-08-22 PROCEDURE — 86900 BLOOD TYPING SEROLOGIC ABO: CPT

## 2024-08-22 PROCEDURE — 3600000002 HC SURGERY LEVEL 2 BASE: Performed by: STUDENT IN AN ORGANIZED HEALTH CARE EDUCATION/TRAINING PROGRAM

## 2024-08-22 RX ORDER — MEPERIDINE HYDROCHLORIDE 25 MG/ML
12.5 INJECTION INTRAMUSCULAR; INTRAVENOUS; SUBCUTANEOUS ONCE
Status: DISCONTINUED | OUTPATIENT
Start: 2024-08-22 | End: 2024-08-22 | Stop reason: HOSPADM

## 2024-08-22 RX ORDER — PROPOFOL 10 MG/ML
INJECTION, EMULSION INTRAVENOUS CONTINUOUS PRN
Status: DISCONTINUED | OUTPATIENT
Start: 2024-08-22 | End: 2024-08-22 | Stop reason: SDUPTHER

## 2024-08-22 RX ORDER — NALOXONE HYDROCHLORIDE 0.4 MG/ML
INJECTION, SOLUTION INTRAMUSCULAR; INTRAVENOUS; SUBCUTANEOUS PRN
Status: DISCONTINUED | OUTPATIENT
Start: 2024-08-22 | End: 2024-08-22 | Stop reason: HOSPADM

## 2024-08-22 RX ORDER — LABETALOL HYDROCHLORIDE 5 MG/ML
5 INJECTION, SOLUTION INTRAVENOUS
Status: DISCONTINUED | OUTPATIENT
Start: 2024-08-22 | End: 2024-08-22 | Stop reason: HOSPADM

## 2024-08-22 RX ORDER — LIDOCAINE HYDROCHLORIDE 10 MG/ML
50 INJECTION, SOLUTION EPIDURAL; INFILTRATION; INTRACAUDAL; PERINEURAL ONCE
Status: DISCONTINUED | OUTPATIENT
Start: 2024-08-22 | End: 2024-08-28 | Stop reason: HOSPADM

## 2024-08-22 RX ORDER — HYDRALAZINE HYDROCHLORIDE 20 MG/ML
5 INJECTION INTRAMUSCULAR; INTRAVENOUS
Status: DISCONTINUED | OUTPATIENT
Start: 2024-08-22 | End: 2024-08-22 | Stop reason: HOSPADM

## 2024-08-22 RX ORDER — SODIUM CHLORIDE 9 MG/ML
INJECTION, SOLUTION INTRAVENOUS PRN
Status: DISCONTINUED | OUTPATIENT
Start: 2024-08-22 | End: 2024-08-22 | Stop reason: HOSPADM

## 2024-08-22 RX ORDER — DIPHENHYDRAMINE HYDROCHLORIDE 50 MG/ML
12.5 INJECTION INTRAMUSCULAR; INTRAVENOUS
Status: DISCONTINUED | OUTPATIENT
Start: 2024-08-22 | End: 2024-08-22 | Stop reason: HOSPADM

## 2024-08-22 RX ORDER — SODIUM CHLORIDE 0.9 % (FLUSH) 0.9 %
5-40 SYRINGE (ML) INJECTION EVERY 12 HOURS SCHEDULED
Status: DISCONTINUED | OUTPATIENT
Start: 2024-08-22 | End: 2024-08-28 | Stop reason: HOSPADM

## 2024-08-22 RX ORDER — LIDOCAINE HYDROCHLORIDE 20 MG/ML
INJECTION, SOLUTION EPIDURAL; INFILTRATION; INTRACAUDAL; PERINEURAL PRN
Status: DISCONTINUED | OUTPATIENT
Start: 2024-08-22 | End: 2024-08-22 | Stop reason: SDUPTHER

## 2024-08-22 RX ORDER — SODIUM CHLORIDE 0.9 % (FLUSH) 0.9 %
5-40 SYRINGE (ML) INJECTION PRN
Status: DISCONTINUED | OUTPATIENT
Start: 2024-08-22 | End: 2024-08-22 | Stop reason: HOSPADM

## 2024-08-22 RX ORDER — BUPIVACAINE HYDROCHLORIDE AND EPINEPHRINE 2.5; 5 MG/ML; UG/ML
INJECTION, SOLUTION EPIDURAL; INFILTRATION; INTRACAUDAL; PERINEURAL PRN
Status: DISCONTINUED | OUTPATIENT
Start: 2024-08-22 | End: 2024-08-22 | Stop reason: ALTCHOICE

## 2024-08-22 RX ORDER — DEXAMETHASONE SODIUM PHOSPHATE 4 MG/ML
INJECTION, SOLUTION INTRA-ARTICULAR; INTRALESIONAL; INTRAMUSCULAR; INTRAVENOUS; SOFT TISSUE PRN
Status: DISCONTINUED | OUTPATIENT
Start: 2024-08-22 | End: 2024-08-22 | Stop reason: SDUPTHER

## 2024-08-22 RX ORDER — SODIUM CHLORIDE 0.9 % (FLUSH) 0.9 %
5-40 SYRINGE (ML) INJECTION EVERY 12 HOURS SCHEDULED
Status: DISCONTINUED | OUTPATIENT
Start: 2024-08-22 | End: 2024-08-22 | Stop reason: HOSPADM

## 2024-08-22 RX ORDER — FENTANYL CITRATE 50 UG/ML
INJECTION, SOLUTION INTRAMUSCULAR; INTRAVENOUS PRN
Status: DISCONTINUED | OUTPATIENT
Start: 2024-08-22 | End: 2024-08-22 | Stop reason: SDUPTHER

## 2024-08-22 RX ORDER — ONDANSETRON 2 MG/ML
INJECTION INTRAMUSCULAR; INTRAVENOUS PRN
Status: DISCONTINUED | OUTPATIENT
Start: 2024-08-22 | End: 2024-08-22 | Stop reason: SDUPTHER

## 2024-08-22 RX ORDER — SODIUM CHLORIDE 9 MG/ML
INJECTION, SOLUTION INTRAVENOUS PRN
Status: DISCONTINUED | OUTPATIENT
Start: 2024-08-22 | End: 2024-08-28 | Stop reason: HOSPADM

## 2024-08-22 RX ORDER — FENTANYL CITRATE 50 UG/ML
25 INJECTION, SOLUTION INTRAMUSCULAR; INTRAVENOUS EVERY 5 MIN PRN
Status: DISCONTINUED | OUTPATIENT
Start: 2024-08-22 | End: 2024-08-22 | Stop reason: HOSPADM

## 2024-08-22 RX ORDER — SODIUM CHLORIDE 9 MG/ML
INJECTION, SOLUTION INTRAVENOUS CONTINUOUS PRN
Status: DISCONTINUED | OUTPATIENT
Start: 2024-08-22 | End: 2024-08-22 | Stop reason: SDUPTHER

## 2024-08-22 RX ORDER — OXYCODONE AND ACETAMINOPHEN 5; 325 MG/1; MG/1
1 TABLET ORAL EVERY 4 HOURS PRN
Status: DISCONTINUED | OUTPATIENT
Start: 2024-08-22 | End: 2024-08-28 | Stop reason: HOSPADM

## 2024-08-22 RX ORDER — MIDAZOLAM HYDROCHLORIDE 1 MG/ML
INJECTION INTRAMUSCULAR; INTRAVENOUS PRN
Status: DISCONTINUED | OUTPATIENT
Start: 2024-08-22 | End: 2024-08-22 | Stop reason: SDUPTHER

## 2024-08-22 RX ORDER — SODIUM CHLORIDE 0.9 % (FLUSH) 0.9 %
5-40 SYRINGE (ML) INJECTION PRN
Status: DISCONTINUED | OUTPATIENT
Start: 2024-08-22 | End: 2024-08-28 | Stop reason: HOSPADM

## 2024-08-22 RX ADMIN — LOPERAMIDE HYDROCHLORIDE 2 MG: 2 CAPSULE ORAL at 08:24

## 2024-08-22 RX ADMIN — VANCOMYCIN HYDROCHLORIDE 1250 MG: 10 INJECTION, POWDER, LYOPHILIZED, FOR SOLUTION INTRAVENOUS at 08:23

## 2024-08-22 RX ADMIN — MORPHINE SULFATE 4 MG: 4 INJECTION, SOLUTION INTRAMUSCULAR; INTRAVENOUS at 17:15

## 2024-08-22 RX ADMIN — DEXAMETHASONE SODIUM PHOSPHATE 8 MG: 4 INJECTION, SOLUTION INTRAMUSCULAR; INTRAVENOUS at 15:26

## 2024-08-22 RX ADMIN — MORPHINE SULFATE 4 MG: 4 INJECTION, SOLUTION INTRAMUSCULAR; INTRAVENOUS at 04:56

## 2024-08-22 RX ADMIN — CEFEPIME 2000 MG: 2 INJECTION, POWDER, FOR SOLUTION INTRAVENOUS at 14:02

## 2024-08-22 RX ADMIN — SODIUM CHLORIDE, PRESERVATIVE FREE 10 ML: 5 INJECTION INTRAVENOUS at 08:24

## 2024-08-22 RX ADMIN — PANTOPRAZOLE SODIUM 40 MG: 40 INJECTION, POWDER, FOR SOLUTION INTRAVENOUS at 21:50

## 2024-08-22 RX ADMIN — MIDAZOLAM 2 MG: 1 INJECTION INTRAMUSCULAR; INTRAVENOUS at 15:12

## 2024-08-22 RX ADMIN — CEFEPIME 2000 MG: 2 INJECTION, POWDER, FOR SOLUTION INTRAVENOUS at 00:30

## 2024-08-22 RX ADMIN — MORPHINE SULFATE 4 MG: 4 INJECTION, SOLUTION INTRAMUSCULAR; INTRAVENOUS at 20:10

## 2024-08-22 RX ADMIN — LOPERAMIDE HYDROCHLORIDE 2 MG: 2 CAPSULE ORAL at 21:50

## 2024-08-22 RX ADMIN — FENTANYL CITRATE 50 MCG: 50 INJECTION, SOLUTION INTRAMUSCULAR; INTRAVENOUS at 15:26

## 2024-08-22 RX ADMIN — FLUCONAZOLE 400 MG: 400 INJECTION, SOLUTION INTRAVENOUS at 17:26

## 2024-08-22 RX ADMIN — TRAZODONE HYDROCHLORIDE 100 MG: 50 TABLET ORAL at 21:50

## 2024-08-22 RX ADMIN — SODIUM CHLORIDE, PRESERVATIVE FREE 10 ML: 5 INJECTION INTRAVENOUS at 21:51

## 2024-08-22 RX ADMIN — PROPOFOL 100 MCG/KG/MIN: 10 INJECTION, EMULSION INTRAVENOUS at 15:19

## 2024-08-22 RX ADMIN — LIDOCAINE HYDROCHLORIDE 40 MG: 20 INJECTION, SOLUTION EPIDURAL; INFILTRATION; INTRACAUDAL; PERINEURAL at 15:19

## 2024-08-22 RX ADMIN — SODIUM CHLORIDE: 9 INJECTION, SOLUTION INTRAVENOUS at 15:12

## 2024-08-22 RX ADMIN — LOPERAMIDE HYDROCHLORIDE 2 MG: 2 CAPSULE ORAL at 17:23

## 2024-08-22 RX ADMIN — ONDANSETRON 4 MG: 2 INJECTION INTRAMUSCULAR; INTRAVENOUS at 15:26

## 2024-08-22 RX ADMIN — FENTANYL CITRATE 50 MCG: 50 INJECTION, SOLUTION INTRAMUSCULAR; INTRAVENOUS at 15:19

## 2024-08-22 RX ADMIN — SERTRALINE 50 MG: 50 TABLET, FILM COATED ORAL at 08:24

## 2024-08-22 RX ADMIN — MORPHINE SULFATE 4 MG: 4 INJECTION, SOLUTION INTRAMUSCULAR; INTRAVENOUS at 00:37

## 2024-08-22 RX ADMIN — LEVOTHYROXINE SODIUM 75 MCG: 75 TABLET ORAL at 05:00

## 2024-08-22 RX ADMIN — DIPHENHYDRAMINE HYDROCHLORIDE 25 MG: 50 INJECTION INTRAMUSCULAR; INTRAVENOUS at 21:50

## 2024-08-22 RX ADMIN — MORPHINE SULFATE 4 MG: 4 INJECTION, SOLUTION INTRAMUSCULAR; INTRAVENOUS at 08:16

## 2024-08-22 ASSESSMENT — PAIN DESCRIPTION - PAIN TYPE
TYPE: ACUTE PAIN

## 2024-08-22 ASSESSMENT — PAIN SCALES - GENERAL
PAINLEVEL_OUTOF10: 8
PAINLEVEL_OUTOF10: 8
PAINLEVEL_OUTOF10: 7
PAINLEVEL_OUTOF10: 7
PAINLEVEL_OUTOF10: 9
PAINLEVEL_OUTOF10: 7
PAINLEVEL_OUTOF10: 1

## 2024-08-22 ASSESSMENT — PAIN DESCRIPTION - LOCATION
LOCATION: ABDOMEN

## 2024-08-22 ASSESSMENT — PAIN DESCRIPTION - DESCRIPTORS
DESCRIPTORS: ACHING;DISCOMFORT;CRAMPING
DESCRIPTORS: SPASM;SHARP
DESCRIPTORS: SHARP;SPASM
DESCRIPTORS: ACHING;DISCOMFORT;CRAMPING

## 2024-08-22 ASSESSMENT — PAIN DESCRIPTION - ORIENTATION
ORIENTATION: MID
ORIENTATION: LOWER

## 2024-08-22 ASSESSMENT — LIFESTYLE VARIABLES: SMOKING_STATUS: 1

## 2024-08-22 NOTE — PROGRESS NOTES
I have assessed this patient's veins with US, left arm has no vein suitable for PIV or PICC or ML. Right arm has one vein suitable for a ML but with the port being removed and future need for TPN still on the table, this vein needs to be preserved for a PICC line for use until more permanent access is determined. I found  a patent usable vein for PIV and will check on status of it tomorrow also. This was discussed with floor RN Karina.

## 2024-08-22 NOTE — ANESTHESIA POSTPROCEDURE EVALUATION
Department of Anesthesiology  Postprocedure Note    Patient: Maryam Santana  MRN: [id number]  YOB: 1965  Date of evaluation: 8/22/2024    Procedure Summary       Date: 08/22/24 Room / Location: [address]    Anesthesia Start: 1512 Anesthesia Stop: 1550    Procedure: MEDI PORT REMOVAL (Right: Chest) Diagnosis:       Bacteremia      (Bacteremia [R78.81])    Surgeons: Erick Hunt DO Responsible Provider: Terence Haddad DO    Anesthesia Type: MAC ASA Status: 3            Anesthesia Type: MAC    Maggie Phase I:      Maggie Phase II:      Anesthesia Post Evaluation    Patient location during evaluation: PACU  Patient participation: complete - patient participated  Level of consciousness: sleepy but conscious  Pain score: 0  Airway patency: patent  Nausea & Vomiting: no vomiting and no nausea  Cardiovascular status: hemodynamically stable  Respiratory status: spontaneous ventilation  Hydration status: stable  Pain management: adequate        No notable events documented.

## 2024-08-22 NOTE — CARE COORDINATION
Patient off floor for mediport removal  Per nursing staff, midline insertion was attempted but was unsuccessful.  ID has verbalized patient would require a 24 hr period w/o central line.  Patient will need followed and assisted with discharge planning as necessary.   Home going infusion needs will need coordinated with Luis at [facility].  Tal Flores, MSN RN  [facility] Case Management  [phone]

## 2024-08-22 NOTE — PROGRESS NOTES
Plan for port to be removed in OR today with Dr. Hunt.     - NPO   - will not order TPN as pt will have port removed and this is how she gets nutrition   - discussed with Dr. Hunt    Electronically signed by Elodia Espinal DO on 8/22/2024 at 8:34 AM

## 2024-08-22 NOTE — PROGRESS NOTES
Pharmacy Consultation Note  (Antibiotic Dosing and Monitoring)    Initial consult date: 8/21  Consulting physician/provider: Dmitri  Drug: Vancomycin  Indication: Bloodstream infection    Age/  Gender Height Weight IBW  Allergy Information   59 y.o./female 154.9 cm (5' 1\") 68 kg (150 lb)     Ideal body weight: 47.8 kg (105 lb 6.1 oz)  Adjusted ideal body weight: 55 kg (121 lb 4.3 oz)   Dilaudid [hydromorphone hcl], Cocoa, Lactose, Lyrica [pregabalin], Phenergan [promethazine hcl], and Phenytoin sodium extended      Renal Function:  Recent Labs     08/20/24  0740 08/21/24  0747 08/22/24  0800   BUN 28* 29* 23*   CREATININE 1.3* 1.2* 1.0       Intake/Output Summary (Last 24 hours) at 8/22/2024 0922  Last data filed at 8/22/2024 0500  Gross per 24 hour   Intake --   Output 1075 ml   Net -1075 ml       Vancomycin Monitoring:  Trough:  No results for input(s): \"VANCOTROUGH\" in the last 72 hours.  Random:  No results for input(s): \"VANCORANDOM\" in the last 72 hours.      Assessment:  Patient is a 59 y.o. female who has been initiated on vancomycin  Estimated Creatinine Clearance: 54 mL/min (based on SCr of 1 mg/dL).  No history of vancomycin levels in EPIC  Patient received vancomycin 1250 mg IV x 1 on 8/19 (1457)  Patient received no vancomycin on 8/20.  Creatinine clearance stable.    Plan:  Continue vancomycin 1250 mg IV q24h   Will check vancomycin levels when appropriate  Will continue to monitor renal function   Pharmacy to follow      Jonah Mart RPH 8/22/2024 9:22 AM    ALTAGRACIA: [phone]  SEY: [phone]  SJW: [phone]

## 2024-08-22 NOTE — ANESTHESIA PRE PROCEDURE
arthritis: rheumatoid., electrolyte abnormalities (Hypokalemia - resolved).          Pt had no PAT visit       Abdominal:         (-) obese       Vascular: negative vascular ROS.         Other Findings: ARACELIS/proptosis            Anesthesia Plan      MAC     ASA 3       Induction: intravenous.    MIPS: Postoperative opioids intended and Prophylactic antiemetics administered.  Anesthetic plan and risks discussed with patient.      Plan discussed with CRNA.              Mc Judge DO   8/22/2024

## 2024-08-22 NOTE — PLAN OF CARE
Problem: ABCDS Injury Assessment  Goal: Absence of physical injury  Outcome: Progressing     Problem: Skin/Tissue Integrity  Goal: Absence of new skin breakdown  Description: 1.  Monitor for areas of redness and/or skin breakdown  2.  Assess vascular access sites hourly  3.  Every 4-6 hours minimum:  Change oxygen saturation probe site  4.  Every 4-6 hours:  If on nasal continuous positive airway pressure, respiratory therapy assess nares and determine need for appliance change or resting period.  Outcome: Progressing     Problem: Discharge Planning  Goal: Discharge to home or other facility with appropriate resources  Outcome: Progressing     Problem: Safety - Adult  Goal: Free from fall injury  Outcome: Progressing     Problem: Chronic Conditions and Co-morbidities  Goal: Patient's chronic conditions and co-morbidity symptoms are monitored and maintained or improved  Outcome: Progressing     Problem: Pain  Goal: Verbalizes/displays adequate comfort level or baseline comfort level  Outcome: Progressing     Problem: Nutrition Deficit:  Goal: Optimize nutritional status  Outcome: Progressing

## 2024-08-22 NOTE — PROGRESS NOTES
[facility]  [facility]  Progress Note    SUBJECTIVE:  Chief Complaint   Patient presents with    Abdominal Pain     Fistula draining large amount yellow drainage.     Fever     No new complaints.  She has poor veins and 2 of them have blown already.    Review of systems:  As stated above in the chief complaint, otherwise negative.    Medications:  Scheduled Meds:   vancomycin  1,250 mg IntraVENous Q24H    insulin lispro  0-4 Units SubCUTAneous TID WC    insulin lispro  0-4 Units SubCUTAneous Nightly    cefepime  2,000 mg IntraVENous Q12H    fluconazole  400 mg IntraVENous Q24H    fat emulsion  250 mL IntraVENous Q MWF    miconazole   Topical BID    aluminum sulfate-calcium acetate  1 packet Topical 4x Daily    menthol-zinc oxide   Topical 4x Daily    sodium chloride flush  5-40 mL IntraVENous 2 times per day    enoxaparin  40 mg SubCUTAneous Daily    levothyroxine  75 mcg Oral Daily    loperamide  2 mg Oral 4x Daily    sertraline  50 mg Oral Daily    traZODone  100 mg Oral Nightly    pantoprazole  40 mg IntraVENous BID     Continuous Infusions:   PN-Adult 2-in-1 Central Line (Standard) 65 mL/hr at 24 1803    dextrose      sodium chloride       PRN Meds:glucose, dextrose bolus **OR** dextrose bolus, glucagon (rDNA), dextrose, ipratropium 0.5 mg-albuterol 2.5 mg, white petrolatum, sodium chloride flush, sodium chloride, potassium chloride **OR** potassium alternative oral replacement **OR** potassium chloride, magnesium sulfate, ondansetron **OR** ondansetron, polyethylene glycol, acetaminophen **OR** acetaminophen, morphine **OR** morphine, diphenhydrAMINE    OBJECTIVE:  /67   Pulse 50   Temp 98.3 °F (36.8 °C) (Oral)   Resp 18   Ht 1.549 m (5' 1\")   Wt 65.8 kg (145 lb 1.6 oz)   SpO2 95%   BMI 27.42 kg/m²   Temp  Av.1 °F (36.7 °C)  Min: 97.6 °F (36.4 °C)  Max: 98.7 °F (37.1 °C)  Constitutional: The patient is lying in bed.  She is awake and in no distress.  Skin:  Warm and dry. No rashes were noted.   HEENT: Round and reactive pupils.  Moist mucous membranes.  No ulcerations or thrush.  Neck: Supple to movements.   Chest: Good breath sounds.  No crackles.  Cardiovascular: Heart sounds rhythmic and regular.  Abdomen: Positive bowel sounds to auscultation.  Bilateral lower ostomies.  Erythema is improving.  Extremities: No edema.  Lines: Peripheral.  Mediport accessed.    Laboratory and Tests:  Lab Results   Component Value Date    CRP 83.0 (H) 08/20/2024    CRP 3.0 07/31/2024    CRP 4.0 03/08/2024     Lab Results   Component Value Date    SEDRATE 57 (H) 08/20/2024    SEDRATE 37 (H) 03/08/2024    SEDRATE 18 12/28/2023       Radiology:      Microbiology:   Blood cultures 8/19/2024: Staphylococcus epidermidis in 4 of 4 bottles  Respiratory panel: Negative  Rapid SARS-CoV-2: Negative  Rapid influenza: Negative    Recent Labs     08/20/24  0740   PROCAL 6.08*       ASSESSMENT:  CLABSI secondary to infected Mediport  Staphylococcus epidermidis bacteremia.  As of yesterday she only had 1 bottle that was positive.  She now has 4 bottles, therefore the Mediport is infected  Crohn disease with 2 enterocutaneous fistulas  Cellulitis of the abdominal wall with possible spontaneously drained abdominal wall abscess-improved  Fever associated to the above  Leukocytosis associated to the above-improved    PLAN:  Continue Cefepime and Fluconazole  Continue Vancomycin.  Dosing per pharmacy  Repeat blood cultures after Mediport has been removed  Mediport removal today  Midline for IV antibiotics    Spoke with nursing.    Cody Rizvi MD  10:47 AM  8/22/2024

## 2024-08-22 NOTE — OP NOTE
Operative Note      Patient: Maryam Santana  YOB: 1965  MRN: [id number]    Date of Procedure: 8/22/2024    Pre-Op Diagnosis Codes:      * Bacteremia [R78.81]    Post-Op Diagnosis: Right mediport removal       Procedure(s):  MEDI PORT REMOVAL    Surgeon(s):  Erick Hunt DO    Assistant:   Resident: Lindsey Gordon DO    Anesthesia: Monitor Anesthesia Care    Estimated Blood Loss (mL): Minimal    Complications: None    Specimens:   ID Type Source Tests Collected by Time Destination   1 : mediport tip culture Specimen Chest CULTURE, ANAEROBIC, GRAM STAIN, CULTURE, SURGICAL Erick Hunt DO 8/22/2024 1532        Implants:  * No implants in log *      Drains:   Colostomy LLQ (Active)   Stomal Appliance 1 piece;Changed 08/22/24 1202   Stoma  Assessment Belden 08/22/24 0810   Peristomal Assessment GUILHERME 08/21/24 0915   Mucocutaneous Junction Intact 08/20/24 1544   Treatment Barrier ring;Pouch change;Site care;Stoma powder;Liquid skin barrier;Other (Comment) 08/20/24 1544   Stool Appearance Watery 08/22/24 1202   Stool Color Green 08/22/24 1202   Stool Amount Small 08/21/24 2005   Output (mL) 400 ml 08/22/24 0810       Findings:  Infection Present At Time Of Surgery (PATOS) (choose all levels that have infection present):  No infection present  Other Findings: Right sided mediport removal    Detailed Description of Procedure:   2 grams of Ancef were given. The patient was placed in the supine position. The right anterior chest wall and neck were appropriately prepped and draped with sterile OR towels and sterile OR drapes.  A 15 blade was then used to make a transverse incision in the location of the prior port placement scar on the right  chest wall. Electrocautery was then used to dissect down through the subcutaneous tissue. Blunt dissection was used to dissect out the Mediport and catheter. Figure of 8 3-0 Vicryl stitch was placed at the catheter exit site. Stay sutures holding mediport to chest  wall were cut x2. The specimen was removed and sent to pathology and for culture. Hemostasis was achieved.     A 3-0 Vicryl was used to approximate the skin in a deep dermal layer and a 4-0 Vicryl was used in a running subcuticular fashion to close the skin. The skin was cleaned and the incision was covered with dermabond.     The patient tolerated the procedure well. There were no complications.   All sponge, needle, and instrument counts were correct.     Dr. Hunt was scrubbed and participated in the key aspects of the procedure.     Electronically signed by Lindsey Gordon DO on 8/22/2024 at 3:48 PM

## 2024-08-22 NOTE — PROGRESS NOTES
[facility] Hospitalist Progress Note    Admitting Date and Time: 8/19/2024 10:53 AM  Admit Dx: Hypokalemia [E87.6]  Lactic acidosis [E87.20]  NEGRO (acute kidney injury) (HCC) [N17.9]  Sepsis (HCC) [A41.9]  Sepsis, due to unspecified organism, unspecified whether acute organ dysfunction present (HCC) [A41.9]    Subjective:  Patient is being followed for Hypokalemia [E87.6]  Lactic acidosis [E87.20]  NEGRO (acute kidney injury) (HCC) [N17.9]  Sepsis (HCC) [A41.9]  Sepsis, due to unspecified organism, unspecified whether acute organ dysfunction present (HCC) [A41.9]   Feeling better without complaints family bedside all questions answered  No CP or SOB  No fever or chills   No uncontrolled pain  No vomiting or diarrhea   No events reported overnight  Chart reviewed      vancomycin  1,250 mg IntraVENous Q24H    insulin lispro  0-4 Units SubCUTAneous TID WC    insulin lispro  0-4 Units SubCUTAneous Nightly    cefepime  2,000 mg IntraVENous Q12H    fluconazole  400 mg IntraVENous Q24H    fat emulsion  250 mL IntraVENous Q MWF    miconazole   Topical BID    aluminum sulfate-calcium acetate  1 packet Topical 4x Daily    menthol-zinc oxide   Topical 4x Daily    sodium chloride flush  5-40 mL IntraVENous 2 times per day    enoxaparin  40 mg SubCUTAneous Daily    levothyroxine  75 mcg Oral Daily    loperamide  2 mg Oral 4x Daily    sertraline  50 mg Oral Daily    traZODone  100 mg Oral Nightly    pantoprazole  40 mg IntraVENous BID     glucose, 4 tablet, PRN  dextrose bolus, 125 mL, PRN   Or  dextrose bolus, 250 mL, PRN  glucagon (rDNA), 1 mg, PRN  dextrose, , Continuous PRN  ipratropium 0.5 mg-albuterol 2.5 mg, 1 Dose, Q4H PRN  white petrolatum, , BID PRN  sodium chloride flush, 5-40 mL, PRN  sodium chloride, , PRN  potassium chloride, 40 mEq, PRN   Or  potassium alternative oral replacement, 40 mEq, PRN   Or  potassium chloride, 10 mEq, PRN  magnesium sulfate, 2,000 mg, PRN  ondansetron, 4 mg, Q8H PRN

## 2024-08-23 LAB
ALBUMIN SERPL-MCNC: 3.7 G/DL (ref 3.5–5.2)
ALP SERPL-CCNC: 111 U/L (ref 35–104)
ALT SERPL-CCNC: 20 U/L (ref 0–32)
AST SERPL-CCNC: 25 U/L (ref 0–31)
BASOPHILS # BLD: 0 K/UL (ref 0–0.2)
BASOPHILS NFR BLD: 0 % (ref 0–2)
BILIRUB DIRECT SERPL-MCNC: <0.2 MG/DL (ref 0–0.3)
BILIRUB INDIRECT SERPL-MCNC: ABNORMAL MG/DL (ref 0–1)
BILIRUB SERPL-MCNC: 0.4 MG/DL (ref 0–1.2)
EOSINOPHIL # BLD: 0 K/UL (ref 0.05–0.5)
EOSINOPHILS RELATIVE PERCENT: 0 % (ref 0–6)
ERYTHROCYTE [DISTWIDTH] IN BLOOD BY AUTOMATED COUNT: 13.3 % (ref 11.5–15)
GLUCOSE BLD-MCNC: 113 MG/DL (ref 74–99)
GLUCOSE BLD-MCNC: 122 MG/DL (ref 74–99)
GLUCOSE BLD-MCNC: 129 MG/DL (ref 74–99)
GLUCOSE BLD-MCNC: 141 MG/DL (ref 74–99)
HCT VFR BLD AUTO: 33.5 % (ref 34–48)
HGB BLD-MCNC: 11 G/DL (ref 11.5–15.5)
IMM GRANULOCYTES # BLD AUTO: 0.03 K/UL (ref 0–0.58)
IMM GRANULOCYTES NFR BLD: 1 % (ref 0–5)
LYMPHOCYTES NFR BLD: 0.87 K/UL (ref 1.5–4)
LYMPHOCYTES RELATIVE PERCENT: 15 % (ref 20–42)
MAGNESIUM SERPL-MCNC: 1.9 MG/DL (ref 1.6–2.6)
MCH RBC QN AUTO: 29.4 PG (ref 26–35)
MCHC RBC AUTO-ENTMCNC: 32.8 G/DL (ref 32–34.5)
MCV RBC AUTO: 89.6 FL (ref 80–99.9)
MICROORGANISM SPEC CULT: ABNORMAL
MICROORGANISM/AGENT SPEC: ABNORMAL
MONOCYTES NFR BLD: 0.18 K/UL (ref 0.1–0.95)
MONOCYTES NFR BLD: 3 % (ref 2–12)
NEUTROPHILS NFR BLD: 81 % (ref 43–80)
NEUTS SEG NFR BLD: 4.56 K/UL (ref 1.8–7.3)
PHOSPHATE SERPL-MCNC: 4.7 MG/DL (ref 2.5–4.5)
PLATELET # BLD AUTO: 229 K/UL (ref 130–450)
PMV BLD AUTO: 9.3 FL (ref 7–12)
PROT SERPL-MCNC: 7.8 G/DL (ref 6.4–8.3)
RBC # BLD AUTO: 3.74 M/UL (ref 3.5–5.5)
SERVICE CMNT-IMP: ABNORMAL
SPECIMEN DESCRIPTION: ABNORMAL
WBC OTHER # BLD: 5.6 K/UL (ref 4.5–11.5)

## 2024-08-23 PROCEDURE — 1200000000 HC SEMI PRIVATE

## 2024-08-23 PROCEDURE — 2580000003 HC RX 258: Performed by: STUDENT IN AN ORGANIZED HEALTH CARE EDUCATION/TRAINING PROGRAM

## 2024-08-23 PROCEDURE — 99232 SBSQ HOSP IP/OBS MODERATE 35: CPT | Performed by: INTERNAL MEDICINE

## 2024-08-23 PROCEDURE — 36415 COLL VENOUS BLD VENIPUNCTURE: CPT

## 2024-08-23 PROCEDURE — 84100 ASSAY OF PHOSPHORUS: CPT

## 2024-08-23 PROCEDURE — 6370000000 HC RX 637 (ALT 250 FOR IP)

## 2024-08-23 PROCEDURE — 80076 HEPATIC FUNCTION PANEL: CPT

## 2024-08-23 PROCEDURE — 6360000002 HC RX W HCPCS: Performed by: SPECIALIST

## 2024-08-23 PROCEDURE — 2580000003 HC RX 258: Performed by: SPECIALIST

## 2024-08-23 PROCEDURE — 36556 INSERT NON-TUNNEL CV CATH: CPT | Performed by: STUDENT IN AN ORGANIZED HEALTH CARE EDUCATION/TRAINING PROGRAM

## 2024-08-23 PROCEDURE — 6370000000 HC RX 637 (ALT 250 FOR IP): Performed by: INTERNAL MEDICINE

## 2024-08-23 PROCEDURE — 85025 COMPLETE CBC W/AUTO DIFF WBC: CPT

## 2024-08-23 PROCEDURE — 82962 GLUCOSE BLOOD TEST: CPT

## 2024-08-23 PROCEDURE — 6360000002 HC RX W HCPCS

## 2024-08-23 PROCEDURE — 6360000002 HC RX W HCPCS: Performed by: INTERNAL MEDICINE

## 2024-08-23 PROCEDURE — 2500000003 HC RX 250 WO HCPCS

## 2024-08-23 PROCEDURE — 83735 ASSAY OF MAGNESIUM: CPT

## 2024-08-23 PROCEDURE — 6360000002 HC RX W HCPCS: Performed by: STUDENT IN AN ORGANIZED HEALTH CARE EDUCATION/TRAINING PROGRAM

## 2024-08-23 PROCEDURE — 2580000003 HC RX 258

## 2024-08-23 RX ORDER — LORAZEPAM 1 MG/1
1 TABLET ORAL ONCE
Status: COMPLETED | OUTPATIENT
Start: 2024-08-23 | End: 2024-08-23

## 2024-08-23 RX ORDER — INSULIN LISPRO 100 [IU]/ML
0-4 INJECTION, SOLUTION INTRAVENOUS; SUBCUTANEOUS EVERY 6 HOURS
Status: DISCONTINUED | OUTPATIENT
Start: 2024-08-24 | End: 2024-08-28 | Stop reason: HOSPADM

## 2024-08-23 RX ADMIN — MORPHINE SULFATE 4 MG: 4 INJECTION, SOLUTION INTRAMUSCULAR; INTRAVENOUS at 21:08

## 2024-08-23 RX ADMIN — SODIUM CHLORIDE, PRESERVATIVE FREE 10 ML: 5 INJECTION INTRAVENOUS at 20:46

## 2024-08-23 RX ADMIN — POTASSIUM CHLORIDE: 2 INJECTION, SOLUTION, CONCENTRATE INTRAVENOUS at 18:42

## 2024-08-23 RX ADMIN — SODIUM CHLORIDE, PRESERVATIVE FREE 10 ML: 5 INJECTION INTRAVENOUS at 13:35

## 2024-08-23 RX ADMIN — SODIUM CHLORIDE, PRESERVATIVE FREE 10 ML: 5 INJECTION INTRAVENOUS at 12:20

## 2024-08-23 RX ADMIN — MORPHINE SULFATE 4 MG: 4 INJECTION, SOLUTION INTRAMUSCULAR; INTRAVENOUS at 11:15

## 2024-08-23 RX ADMIN — CEFEPIME 2000 MG: 2 INJECTION, POWDER, FOR SOLUTION INTRAVENOUS at 14:40

## 2024-08-23 RX ADMIN — LOPERAMIDE HYDROCHLORIDE 2 MG: 2 CAPSULE ORAL at 13:34

## 2024-08-23 RX ADMIN — LOPERAMIDE HYDROCHLORIDE 2 MG: 2 CAPSULE ORAL at 17:48

## 2024-08-23 RX ADMIN — SOYBEAN OIL 250 ML: 20 INJECTION, SOLUTION INTRAVENOUS at 18:48

## 2024-08-23 RX ADMIN — ANORECTAL OINTMENT: 15.7; .44; 24; 20.6 OINTMENT TOPICAL at 20:47

## 2024-08-23 RX ADMIN — MORPHINE SULFATE 4 MG: 4 INJECTION, SOLUTION INTRAMUSCULAR; INTRAVENOUS at 13:34

## 2024-08-23 RX ADMIN — LORAZEPAM 1 MG: 1 TABLET ORAL at 10:09

## 2024-08-23 RX ADMIN — MORPHINE SULFATE 4 MG: 4 INJECTION, SOLUTION INTRAMUSCULAR; INTRAVENOUS at 02:28

## 2024-08-23 RX ADMIN — LOPERAMIDE HYDROCHLORIDE 2 MG: 2 CAPSULE ORAL at 20:46

## 2024-08-23 RX ADMIN — LEVOTHYROXINE SODIUM 75 MCG: 75 TABLET ORAL at 06:33

## 2024-08-23 RX ADMIN — PANTOPRAZOLE SODIUM 40 MG: 40 INJECTION, POWDER, FOR SOLUTION INTRAVENOUS at 11:16

## 2024-08-23 RX ADMIN — MORPHINE SULFATE 4 MG: 4 INJECTION, SOLUTION INTRAMUSCULAR; INTRAVENOUS at 17:49

## 2024-08-23 RX ADMIN — MICONAZOLE NITRATE: 2 POWDER TOPICAL at 20:47

## 2024-08-23 RX ADMIN — TRAZODONE HYDROCHLORIDE 100 MG: 50 TABLET ORAL at 20:46

## 2024-08-23 RX ADMIN — MORPHINE SULFATE 4 MG: 4 INJECTION, SOLUTION INTRAMUSCULAR; INTRAVENOUS at 06:34

## 2024-08-23 RX ADMIN — FLUCONAZOLE 400 MG: 400 INJECTION, SOLUTION INTRAVENOUS at 12:15

## 2024-08-23 RX ADMIN — LOPERAMIDE HYDROCHLORIDE 2 MG: 2 CAPSULE ORAL at 11:15

## 2024-08-23 RX ADMIN — PANTOPRAZOLE SODIUM 40 MG: 40 INJECTION, POWDER, FOR SOLUTION INTRAVENOUS at 20:46

## 2024-08-23 RX ADMIN — VANCOMYCIN HYDROCHLORIDE 1250 MG: 10 INJECTION, POWDER, LYOPHILIZED, FOR SOLUTION INTRAVENOUS at 11:20

## 2024-08-23 RX ADMIN — CEFEPIME 2000 MG: 2 INJECTION, POWDER, FOR SOLUTION INTRAVENOUS at 02:35

## 2024-08-23 RX ADMIN — SERTRALINE 50 MG: 50 TABLET, FILM COATED ORAL at 11:04

## 2024-08-23 ASSESSMENT — PAIN - FUNCTIONAL ASSESSMENT: PAIN_FUNCTIONAL_ASSESSMENT: ACTIVITIES ARE NOT PREVENTED

## 2024-08-23 ASSESSMENT — PAIN DESCRIPTION - DESCRIPTORS
DESCRIPTORS: SHARP;SPASM
DESCRIPTORS: SHARP;SORE
DESCRIPTORS: BURNING;SHARP
DESCRIPTORS: SHARP;SPASM

## 2024-08-23 ASSESSMENT — PAIN SCALES - GENERAL
PAINLEVEL_OUTOF10: 4
PAINLEVEL_OUTOF10: 8
PAINLEVEL_OUTOF10: 4
PAINLEVEL_OUTOF10: 7
PAINLEVEL_OUTOF10: 7
PAINLEVEL_OUTOF10: 8
PAINLEVEL_OUTOF10: 7

## 2024-08-23 ASSESSMENT — PAIN DESCRIPTION - LOCATION
LOCATION: ABDOMEN
LOCATION: ABDOMEN;GROIN
LOCATION: ABDOMEN

## 2024-08-23 ASSESSMENT — PAIN DESCRIPTION - ORIENTATION
ORIENTATION: MID
ORIENTATION: RIGHT;MID
ORIENTATION: MID;LOWER

## 2024-08-23 NOTE — PROGRESS NOTES
[facility]  [facility]  Progress Note    SUBJECTIVE:  Chief Complaint   Patient presents with    Abdominal Pain     Fistula draining large amount yellow drainage.     Fever     Mediport was removed yesterday.  She had a triple-lumen catheter inserted.  No nausea or vomiting.  Some pain in the chest.    Review of systems:  As stated above in the chief complaint, otherwise negative.    Medications:  Scheduled Meds:   sodium chloride flush  5-40 mL IntraVENous 2 times per day    lidocaine 1 % injection  50 mg IntraDERmal Once    vancomycin  1,250 mg IntraVENous Q24H    insulin lispro  0-4 Units SubCUTAneous TID WC    insulin lispro  0-4 Units SubCUTAneous Nightly    cefepime  2,000 mg IntraVENous Q12H    fluconazole  400 mg IntraVENous Q24H    fat emulsion  250 mL IntraVENous Q MWF    miconazole   Topical BID    aluminum sulfate-calcium acetate  1 packet Topical 4x Daily    menthol-zinc oxide   Topical 4x Daily    sodium chloride flush  5-40 mL IntraVENous 2 times per day    enoxaparin  40 mg SubCUTAneous Daily    levothyroxine  75 mcg Oral Daily    loperamide  2 mg Oral 4x Daily    sertraline  50 mg Oral Daily    traZODone  100 mg Oral Nightly    pantoprazole  40 mg IntraVENous BID     Continuous Infusions:   PN-Adult 2-in-1 Central Line (Standard)      sodium chloride      dextrose      sodium chloride       PRN Meds:sodium chloride flush, sodium chloride, oxyCODONE-acetaminophen, glucose, dextrose bolus **OR** dextrose bolus, glucagon (rDNA), dextrose, ipratropium 0.5 mg-albuterol 2.5 mg, white petrolatum, sodium chloride flush, sodium chloride, potassium chloride **OR** potassium alternative oral replacement **OR** potassium chloride, magnesium sulfate, ondansetron **OR** ondansetron, polyethylene glycol, acetaminophen **OR** acetaminophen, morphine **OR** morphine, diphenhydrAMINE    OBJECTIVE:  /73   Pulse 50   Temp 97.9 °F (36.6 °C) (Oral)   Resp 16   Ht 1.549 m (5' 1\")

## 2024-08-23 NOTE — PROGRESS NOTES
GENERAL SURGERY  DAILY PROGRESS NOTE    Patient's Name/Date of Birth: Maryam Santana / 1965    Date: 2024     Chief Complaint   Patient presents with    Abdominal Pain     Fistula draining large amount yellow drainage.     Fever        Subjective:    Mediport removal incision clean. No new changes.    Objective:  Last 24Hrs  Temp  Av.9 °F (36.6 °C)  Min: 97.6 °F (36.4 °C)  Max: 98.3 °F (36.8 °C)  Resp  Avg: 15.9  Min: 11  Max: 18  Pulse  Av.5  Min: 39  Max: 65  Systolic (24hrs), Av , Min:84 , Max:137     Diastolic (24hrs), Av, Min:49, Max:86    SpO2  Av.1 %  Min: 92 %  Max: 98 %    I/O last 3 completed shifts:  In: 350 [I.V.:350]  Out:  [Urine:650; Other:350; Stool:1025]      General: resting in bed  Cardiovascular: Warm throughout, no edema  Respiratory: no respiratory distress, equal chest rise  Chest: previous mediport site on right chest wall incision C/D/I covered with dermabond.  Abdomen: Previous midline wound with surrounding skin irritation and bilious output from fistula with ostomy bag in place. Ostomy present on LUQ with output noted.   Skin: erythema and skin irritation surrounding fistula  Extremities: moving all extremities      CBC  Recent Labs     24  0740 24  0747 24  0800   WBC 9.5 7.9 6.3   RBC 3.91 3.68 3.84   HGB 11.5 10.9* 11.4*   HCT 35.1 34.7 34.5   MCV 89.8 94.3 89.8   MCH 29.4 29.6 29.7   MCHC 32.8 31.4* 33.0   RDW 13.7 13.8 13.8    225 226   MPV 9.0 9.5 8.9       CMP  Recent Labs     24  0740 24  0747 24  0800    140 141   K 3.2* 3.3* 3.7    104 107   CO2 28 30* 23   BUN 28* 29* 23*   CREATININE 1.3* 1.2* 1.0   GLUCOSE 141* 126* 114*   CALCIUM 9.1 9.4 9.0   BILITOT 0.5 0.2 0.2   ALKPHOS 98 83 83   AST 31 26 22   ALT 17 15 14         Assessment/Plan:    Patient Active Problem List   Diagnosis    Ventral hernia with bowel obstruction    Crohn's disease (HCC)    History of colon cancer     Depression    Crohn's disease of small intestine with intestinal obstruction (HCC)    Septic shock (HCC)    Enterocutaneous fistula    Ascending cholangitis    Bacteremia    Hypertension    NEGRO (acute kidney injury) (HCC)    Type 2 diabetes mellitus, without long-term current use of insulin (HCC)    Moderate protein-calorie malnutrition (HCC)    Hypothyroidism    Controlled type 2 diabetes mellitus without complication (HCC)    Sepsis (HCC)    Lactic acidosis       59 y.o. female with enterocutaneous fistula and extensive surgical history admitted with sepsis      - ID consulted, and they recommended Cefepime/Fluconazole and restarting Zosyn   - NPO    - will plan for central line today so that pt can restart TPN pending ID recommendations   - appreciate wound care recommendations   - consult to Dr. Kumari, appreciate recommendations   - no signs of intraabdominal source for sepsis    - pt will need to go to [facility] for small bowel transplant for discussion about fistula takedown and reversal in the outpatient setting   - will discuss with Dr. Marilee Espinal,   General Surgery Resident, PGY-2    Electronically signed on 8/23/2024 at 5:16 AM

## 2024-08-23 NOTE — FLOWSHEET NOTE
Inpatient Wound Care (initial consult) 423    Admit Date: 8/19/2024 10:53 AM    Reason for consult:  fistula, colostomy    Patient laying down in bed, awake, alert and oriented. Patient independent to roll.     Past Medical History:   Diagnosis Date    Cancer (HCC)     colon    Depression     Fissure, anal     Hernia     Hypertension      Findings:     08/23/24 1005   Colostomy LLQ   Placement Date/Time: (c) 12/28/23 (c) 2300   Present on Admission/Arrival: Yes  Description (optional): colostomy  Location: LLQ   Stomal Appliance 1 piece;Flat;Changed  (pediatric pouch)   Stoma  Assessment Pink;Moist;Protrudes   Peristomal Assessment Clean, dry & intact   Mucocutaneous Junction Intact   Stool Appearance   (thin dark green)   Stool Color Green;Other (Comment)  (dark)   Stool Amount Small   Wound 12/28/23 Abdomen Lower;Medial fistula   Date First Assessed/Time First Assessed: 12/28/23 0912   Present on Original Admission: Yes  Primary Wound Type: Surgical Type  Location: Abdomen  Wound Location Orientation: Lower;Medial  Wound Description (Comments): fistula   Wound Image    Wound Etiology Other  (EC fistula)   Dressing Status New dressing applied   Wound Cleansed Cleansed with saline   Dressing/Treatment Fistula pouch   Wound Length (cm) 1 cm   Wound Width (cm) 1 cm   Wound Depth (cm)   (unable to determine)   Wound Surface Area (cm^2) 1 cm^2   Change in Wound Size % (l*w) 0   Wound Assessment Pink/red   Drainage Amount Moderate (25-50%)   Drainage Description Green;Thin  (dark GI content)   Odor None   Nadia-wound Assessment Erosion     Patient acceptance for fistula care lesson and pouch change. Demonstrated removal and application of pouch. Answered all questions. Lesson was verbal with step by step pictures provided to help remember. Supplies left at bedside. Awaiting  to visit to review steps of fistula pouch placement with him.     **Informed Consent**    The patient has given verbal consent to have photos  taken of wounds and inserted into their chart as part of their permanent medical record for purposes of documentation, treatment management and/or medical review.   All Images taken on 8/23/24 of patient name: Maryam Santana were transmitted and stored on secured Epic  Site located within Media Folder Tab by a registered [employer] Mobile Application Device.     Plan:   Fistula pouch to fistula  Comfort glide  Wedges  Heel protectors  Low air loss bed  Chair waffle cushion  Dietary consult  Patient will need continued preventative care  Change fistula pouch colostomy pouch, assess to teach application of fistula pouch, will follow.    Herlinda Garcia RN 8/23/2024 1:46 PM

## 2024-08-23 NOTE — PROGRESS NOTES
[facility] Hospitalist Progress Note    Admitting Date and Time: 8/19/2024 10:53 AM  Admit Dx: Hypokalemia [E87.6]  Lactic acidosis [E87.20]  NEGRO (acute kidney injury) (HCC) [N17.9]  Sepsis (HCC) [A41.9]  Sepsis, due to unspecified organism, unspecified whether acute organ dysfunction present (HCC) [A41.9]    Subjective:  Patient is being followed for Hypokalemia [E87.6]  Lactic acidosis [E87.20]  NEGRO (acute kidney injury) (HCC) [N17.9]  Sepsis (HCC) [A41.9]  Sepsis, due to unspecified organism, unspecified whether acute organ dysfunction present (HCC) [A41.9]   Feeling better without complaints family bedside all questions answered  No CP or SOB  No fever or chills   No uncontrolled pain  No vomiting or diarrhea   No events reported overnight  Chart reviewed      LORazepam  1 mg Oral Once    sodium chloride flush  5-40 mL IntraVENous 2 times per day    lidocaine 1 % injection  50 mg IntraDERmal Once    vancomycin  1,250 mg IntraVENous Q24H    insulin lispro  0-4 Units SubCUTAneous TID WC    insulin lispro  0-4 Units SubCUTAneous Nightly    cefepime  2,000 mg IntraVENous Q12H    fluconazole  400 mg IntraVENous Q24H    fat emulsion  250 mL IntraVENous Q MWF    miconazole   Topical BID    aluminum sulfate-calcium acetate  1 packet Topical 4x Daily    menthol-zinc oxide   Topical 4x Daily    sodium chloride flush  5-40 mL IntraVENous 2 times per day    enoxaparin  40 mg SubCUTAneous Daily    levothyroxine  75 mcg Oral Daily    loperamide  2 mg Oral 4x Daily    sertraline  50 mg Oral Daily    traZODone  100 mg Oral Nightly    pantoprazole  40 mg IntraVENous BID     sodium chloride flush, 5-40 mL, PRN  sodium chloride, , PRN  oxyCODONE-acetaminophen, 1 tablet, Q4H PRN  glucose, 4 tablet, PRN  dextrose bolus, 125 mL, PRN   Or  dextrose bolus, 250 mL, PRN  glucagon (rDNA), 1 mg, PRN  dextrose, , Continuous PRN  ipratropium 0.5 mg-albuterol 2.5 mg, 1 Dose, Q4H PRN  white petrolatum, , BID PRN  sodium chloride  appreciated plan for central line today for continued TPN  Colostomy- since bowel perforation during hysterectomy. Currently with decreased stool output to ostomy, c/f leaking at fistula sites. Will need eventual outpatient [facility] evaluation for small bowel transplant and discussion of fistula takedown  HTN-initially HoTN on admission, hold home meds, cont IVF w/ prn boluses, suspect d/t volume contraction/ high output/ GI losses/sepsis BP improving  Asthma- not in acute exac, cont nebs/inhalers  Anxiety/depression- cont meds  Hypothyroid- cont meds  DM2- not on home orals, SSI for now    Dispo: pending    NOTE: Portions of this report may have been transcribed using voice recognition software. Every effort was made to ensure accuracy; however, inadvertent computerized transcription errors may be present.  Electronically signed by Asaf Soto MD on 8/23/2024 at 10:00 AM

## 2024-08-23 NOTE — PROGRESS NOTES
Pharmacy Consultation Note  (Antibiotic Dosing and Monitoring)    Initial consult date: 8/21  Consulting physician/provider: Dmitri  Drug: Vancomycin  Indication: Bloodstream infection    Age/  Gender Height Weight IBW  Allergy Information   59 y.o./female 154.9 cm (5' 1\") 68 kg (150 lb)     Ideal body weight: 47.8 kg (105 lb 6.1 oz)  Adjusted ideal body weight: 55 kg (121 lb 4.3 oz)   Dilaudid [hydromorphone hcl], Cocoa, Lactose, Lyrica [pregabalin], Phenergan [promethazine hcl], and Phenytoin sodium extended      Renal Function:  Recent Labs     08/21/24  0747 08/22/24  0800   BUN 29* 23*   CREATININE 1.2* 1.0       Intake/Output Summary (Last 24 hours) at 8/23/2024 0915  Last data filed at 8/22/2024 1539  Gross per 24 hour   Intake 350 ml   Output --   Net 350 ml       Vancomycin Monitoring:  Trough:  No results for input(s): \"VANCOTROUGH\" in the last 72 hours.  Random:  No results for input(s): \"VANCORANDOM\" in the last 72 hours.      Assessment:  Patient is a 59 y.o. female who has been initiated on vancomycin  Estimated Creatinine Clearance: 54 mL/min (based on SCr of 1 mg/dL).  No history of vancomycin levels in EPIC  Patient received vancomycin 1250 mg IV x 1 on 8/19 (1457)  Patient received no vancomycin on 8/20.    Plan:  Continue vancomycin 1250 mg IV q24h   Will order vancomycin trough level on 8/24 at 0700.  PLEASE HOLD VANCOMYCIN IF VANCOMYCIN LEVEL IS GREATER THAN 20 MCG/ML.  Will assess vancomycin level on 8/24 to determine future vancomycin dosing plans.   Will continue to monitor renal function   Pharmacy to follow      Jonah Mart RPH 8/23/2024 9:15 AM    ALTAGRACIA: [phone]  SEY: [phone]  SJW: [phone]

## 2024-08-23 NOTE — PROGRESS NOTES
Comprehensive Nutrition Assessment    Type and Reason for Visit:  Reassess    Nutrition Recommendations/Plan:   When central access obtained, recommend resume TPN:    TPN RECOMMENDATION:  Standard 2-in-1 Central PN at 65 ml/hr (1560 ml/d) with Lipids 250 ml 20% x 3/week  This will provide: 1322 kana, 78 g AA  This regimen will meet ~100% energy and protein needs    Continue inpatient monitoring     Malnutrition Assessment:  Malnutrition Status:  At risk for malnutrition (Comment) (08/20/24 1135)    Context:  Chronic Illness     Findings of the 6 clinical characteristics of malnutrition:  Energy Intake:  Mild decrease in energy intake (Comment) (NPO until PN ordered)  Weight Loss:  No significant weight loss     Body Fat Loss:  No significant body fat loss     Muscle Mass Loss:  Mild muscle mass loss Clavicles (pectoralis & deltoids)  Fluid Accumulation:  No significant fluid accumulation     Strength:  Not Performed    Nutrition Assessment:    Pt s/p Mediport removal 8/22 d/t CLABSI. Pt continues to need TPN support, so PICC planned, pending ID OK. Will provide recommendation for PN to meet pt needs, when access is obtained.    Nutrition Related Findings:    A&Ox4, (8/21), rounded tender abd +BS and ECF, colostomy +stool, no edema, I/O WNL Wound Type: Multiple (denuded skin around leaking ECF, colostomy - per wound care)       Current Nutrition Intake & Therapies:    Average Meal Intake: NPO  Average Supplements Intake: NPO  PN-Adult 2-in-1 Central Line (Standard)  Diet NPO Exceptions are: Sips of Water with Meds, Ice Chips    Anthropometric Measures:  Height: 154.9 cm (5' 1\")  Ideal Body Weight (IBW): 105 lbs (48 kg)    Admission Body Weight: 65 kg (143 lb 4.8 oz) (8/20)  Current Body Weight: 65.8 kg (145 lb 1 oz), 136.5 % IBW. Weight Source: Bed Scale (8/22)  Current BMI (kg/m2): 27.4  Usual Body Weight: 63.3 kg (139 lb 9 oz) (3/12/24 bedscale)  % Weight Change (Calculated): 2.7                    BMI  Categories: Overweight (BMI 25.0-29.9)    Estimated Daily Nutrient Needs:  Energy Requirements Based On: Formula (Artemus St. Jeor)  Weight Used for Energy Requirements: Current  Energy (kcal/day):   Weight Used for Protein Requirements: Ideal  Protein (g/day): 65-75 (1.3-1.5 g/kg)  Method Used for Fluid Requirements: 1 ml/kcal  Fluid (ml/day):     Nutrition Diagnosis:   Inadequate oral intake related to altered GI structure as evidenced by NPO or clear liquid status due to medical condition, nutrition support - parenteral nutrition    Nutrition Interventions:   Food and/or Nutrient Delivery: Continue NPO (Resume Central PN when access available)  Nutrition Education/Counseling: Education not indicated  Coordination of Nutrition Care: Continue to monitor while inpatient       Goals:  Previous Goal Met: Progress towards Goal(s) Declining  Goals: Tolerate nutrition support at goal rate, by next RD assessment       Nutrition Monitoring and Evaluation:   Behavioral-Environmental Outcomes: None Identified  Food/Nutrient Intake Outcomes: Parenteral Nutrition Intake/Tolerance  Physical Signs/Symptoms Outcomes: Biochemical Data, GI Status, Fluid Status or Edema, Nutrition Focused Physical Findings, Skin, Weight    Discharge Planning:    Parenteral Nutrition     Abby Dorsey, RD, CNSC, LD  Contact: [phone]

## 2024-08-23 NOTE — PROCEDURES
PROCEDURE NOTE  Date: 8/23/2024   Name: Maryam Santana  YOB: 1965    CENTRAL LINE    Date/Time: 8/23/2024 1:19 PM    Performed by: Lindsey Gordon DO  Authorized by: Lindsey Gordon DO  Consent: Verbal consent obtained. Written consent obtained.  Risks and benefits: risks, benefits and alternatives were discussed  Consent given by: patient  Required items: required blood products, implants, devices, and special equipment available  Patient identity confirmed: verbally with patient and arm band  Time out: Immediately prior to procedure a \"time out\" was called to verify the correct patient, procedure, equipment, support staff and site/side marked as required.  Indications: vascular access  Anesthesia: local infiltration    Anesthesia:  Local Anesthetic: lidocaine 1% with epinephrine  Anesthetic total: 8 mL    Sedation:  Patient sedated: no    Preparation: skin prepped with ChloraPrep  Skin prep agent dried: skin prep agent completely dried prior to procedure  Sterile barriers: all five maximum sterile barriers used - cap, mask, sterile gown, sterile gloves, and large sterile sheet  Hand hygiene: hand hygiene performed prior to central venous catheter insertion  Location details: right femoral  Patient position: flat  Catheter type: triple lumen  Catheter size: 7 Fr  Pre-procedure: landmarks identified  Ultrasound guidance: yes  Sterile ultrasound techniques: sterile gel and sterile probe covers were used  Number of attempts: 1  Successful placement: yes  Post-procedure: line sutured and dressing applied  Assessment: blood return through all ports and free fluid flow  Patient tolerance: patient tolerated the procedure well with no immediate complications  Comments: Dr. Hunt was available throughout the procedure.

## 2024-08-24 LAB
ALBUMIN SERPL-MCNC: 3.2 G/DL (ref 3.5–5.2)
ALBUMIN SERPL-MCNC: 3.4 G/DL (ref 3.5–5.2)
ALP SERPL-CCNC: 88 U/L (ref 35–104)
ALP SERPL-CCNC: 89 U/L (ref 35–104)
ALT SERPL-CCNC: 20 U/L (ref 0–32)
ALT SERPL-CCNC: 24 U/L (ref 0–32)
ANION GAP SERPL CALCULATED.3IONS-SCNC: 12 MMOL/L (ref 7–16)
AST SERPL-CCNC: 23 U/L (ref 0–31)
AST SERPL-CCNC: 29 U/L (ref 0–31)
BILIRUB DIRECT SERPL-MCNC: <0.2 MG/DL (ref 0–0.3)
BILIRUB INDIRECT SERPL-MCNC: ABNORMAL MG/DL (ref 0–1)
BILIRUB SERPL-MCNC: 0.3 MG/DL (ref 0–1.2)
BILIRUB SERPL-MCNC: 0.3 MG/DL (ref 0–1.2)
BUN SERPL-MCNC: 29 MG/DL (ref 6–20)
CALCIUM SERPL-MCNC: 9.4 MG/DL (ref 8.6–10.2)
CALPROTECTIN, FECAL: 27 UG/G
CHLORIDE SERPL-SCNC: 105 MMOL/L (ref 98–107)
CO2 SERPL-SCNC: 21 MMOL/L (ref 22–29)
CREAT SERPL-MCNC: 1.1 MG/DL (ref 0.5–1)
DATE LAST DOSE: NORMAL
GFR, ESTIMATED: 61 ML/MIN/1.73M2
GLUCOSE BLD-MCNC: 117 MG/DL (ref 74–99)
GLUCOSE BLD-MCNC: 149 MG/DL (ref 74–99)
GLUCOSE BLD-MCNC: 149 MG/DL (ref 74–99)
GLUCOSE SERPL-MCNC: 141 MG/DL (ref 74–99)
MAGNESIUM SERPL-MCNC: 1.9 MG/DL (ref 1.6–2.6)
PHOSPHATE SERPL-MCNC: 2.5 MG/DL (ref 2.5–4.5)
POTASSIUM SERPL-SCNC: 4.5 MMOL/L (ref 3.5–5)
PROT SERPL-MCNC: 6.4 G/DL (ref 6.4–8.3)
PROT SERPL-MCNC: 6.7 G/DL (ref 6.4–8.3)
SODIUM SERPL-SCNC: 138 MMOL/L (ref 132–146)
TME LAST DOSE: NORMAL H
VANCOMYCIN DOSE: NORMAL MG
VANCOMYCIN TROUGH SERPL-MCNC: 10.3 UG/ML (ref 5–16)
VANCOMYCIN TROUGH SERPL-MCNC: 9.3 UG/ML (ref 5–16)

## 2024-08-24 PROCEDURE — 80202 ASSAY OF VANCOMYCIN: CPT

## 2024-08-24 PROCEDURE — 6360000002 HC RX W HCPCS: Performed by: STUDENT IN AN ORGANIZED HEALTH CARE EDUCATION/TRAINING PROGRAM

## 2024-08-24 PROCEDURE — 80053 COMPREHEN METABOLIC PANEL: CPT

## 2024-08-24 PROCEDURE — 87040 BLOOD CULTURE FOR BACTERIA: CPT

## 2024-08-24 PROCEDURE — 6360000002 HC RX W HCPCS: Performed by: SPECIALIST

## 2024-08-24 PROCEDURE — 6360000002 HC RX W HCPCS: Performed by: SURGERY

## 2024-08-24 PROCEDURE — 6360000002 HC RX W HCPCS: Performed by: INTERNAL MEDICINE

## 2024-08-24 PROCEDURE — 82962 GLUCOSE BLOOD TEST: CPT

## 2024-08-24 PROCEDURE — 2580000003 HC RX 258: Performed by: SPECIALIST

## 2024-08-24 PROCEDURE — 83735 ASSAY OF MAGNESIUM: CPT

## 2024-08-24 PROCEDURE — 6370000000 HC RX 637 (ALT 250 FOR IP): Performed by: INTERNAL MEDICINE

## 2024-08-24 PROCEDURE — 99232 SBSQ HOSP IP/OBS MODERATE 35: CPT | Performed by: INTERNAL MEDICINE

## 2024-08-24 PROCEDURE — 1200000000 HC SEMI PRIVATE

## 2024-08-24 PROCEDURE — 84100 ASSAY OF PHOSPHORUS: CPT

## 2024-08-24 PROCEDURE — 2580000003 HC RX 258: Performed by: SURGERY

## 2024-08-24 PROCEDURE — 6370000000 HC RX 637 (ALT 250 FOR IP): Performed by: STUDENT IN AN ORGANIZED HEALTH CARE EDUCATION/TRAINING PROGRAM

## 2024-08-24 PROCEDURE — 99232 SBSQ HOSP IP/OBS MODERATE 35: CPT | Performed by: SURGERY

## 2024-08-24 PROCEDURE — 2500000003 HC RX 250 WO HCPCS: Performed by: SURGERY

## 2024-08-24 PROCEDURE — 80076 HEPATIC FUNCTION PANEL: CPT

## 2024-08-24 RX ADMIN — FLUCONAZOLE 400 MG: 400 INJECTION, SOLUTION INTRAVENOUS at 11:11

## 2024-08-24 RX ADMIN — SODIUM CHLORIDE, PRESERVATIVE FREE 10 ML: 5 INJECTION INTRAVENOUS at 20:42

## 2024-08-24 RX ADMIN — MORPHINE SULFATE 4 MG: 4 INJECTION, SOLUTION INTRAMUSCULAR; INTRAVENOUS at 20:43

## 2024-08-24 RX ADMIN — OXYCODONE HYDROCHLORIDE AND ACETAMINOPHEN 1 TABLET: 5; 325 TABLET ORAL at 13:31

## 2024-08-24 RX ADMIN — LEVOTHYROXINE SODIUM 75 MCG: 75 TABLET ORAL at 05:51

## 2024-08-24 RX ADMIN — ANORECTAL OINTMENT: 15.7; .44; 24; 20.6 OINTMENT TOPICAL at 08:40

## 2024-08-24 RX ADMIN — MORPHINE SULFATE 4 MG: 4 INJECTION, SOLUTION INTRAMUSCULAR; INTRAVENOUS at 05:51

## 2024-08-24 RX ADMIN — LOPERAMIDE HYDROCHLORIDE 2 MG: 2 CAPSULE ORAL at 08:24

## 2024-08-24 RX ADMIN — ENOXAPARIN SODIUM 40 MG: 100 INJECTION SUBCUTANEOUS at 08:26

## 2024-08-24 RX ADMIN — MICONAZOLE NITRATE: 2 POWDER TOPICAL at 08:41

## 2024-08-24 RX ADMIN — OXYCODONE HYDROCHLORIDE AND ACETAMINOPHEN 1 TABLET: 5; 325 TABLET ORAL at 21:21

## 2024-08-24 RX ADMIN — OXYCODONE HYDROCHLORIDE AND ACETAMINOPHEN 1 TABLET: 5; 325 TABLET ORAL at 17:35

## 2024-08-24 RX ADMIN — TRAZODONE HYDROCHLORIDE 100 MG: 50 TABLET ORAL at 20:43

## 2024-08-24 RX ADMIN — MORPHINE SULFATE 4 MG: 4 INJECTION, SOLUTION INTRAMUSCULAR; INTRAVENOUS at 16:38

## 2024-08-24 RX ADMIN — MORPHINE SULFATE 4 MG: 4 INJECTION, SOLUTION INTRAMUSCULAR; INTRAVENOUS at 18:43

## 2024-08-24 RX ADMIN — ANORECTAL OINTMENT: 15.7; .44; 24; 20.6 OINTMENT TOPICAL at 16:31

## 2024-08-24 RX ADMIN — VANCOMYCIN HYDROCHLORIDE 1250 MG: 10 INJECTION, POWDER, LYOPHILIZED, FOR SOLUTION INTRAVENOUS at 08:37

## 2024-08-24 RX ADMIN — PANTOPRAZOLE SODIUM 40 MG: 40 INJECTION, POWDER, FOR SOLUTION INTRAVENOUS at 20:43

## 2024-08-24 RX ADMIN — CEFEPIME 2000 MG: 2 INJECTION, POWDER, FOR SOLUTION INTRAVENOUS at 17:38

## 2024-08-24 RX ADMIN — PANTOPRAZOLE SODIUM 40 MG: 40 INJECTION, POWDER, FOR SOLUTION INTRAVENOUS at 08:24

## 2024-08-24 RX ADMIN — MORPHINE SULFATE 4 MG: 4 INJECTION, SOLUTION INTRAMUSCULAR; INTRAVENOUS at 02:56

## 2024-08-24 RX ADMIN — SODIUM CHLORIDE, PRESERVATIVE FREE 10 ML: 5 INJECTION INTRAVENOUS at 08:26

## 2024-08-24 RX ADMIN — LOPERAMIDE HYDROCHLORIDE 2 MG: 2 CAPSULE ORAL at 16:26

## 2024-08-24 RX ADMIN — MORPHINE SULFATE 4 MG: 4 INJECTION, SOLUTION INTRAMUSCULAR; INTRAVENOUS at 14:22

## 2024-08-24 RX ADMIN — MORPHINE SULFATE 4 MG: 4 INJECTION, SOLUTION INTRAMUSCULAR; INTRAVENOUS at 08:39

## 2024-08-24 RX ADMIN — ANORECTAL OINTMENT: 15.7; .44; 24; 20.6 OINTMENT TOPICAL at 13:38

## 2024-08-24 RX ADMIN — CALCIUM GLUCONATE: 98 INJECTION, SOLUTION INTRAVENOUS at 17:44

## 2024-08-24 RX ADMIN — LOPERAMIDE HYDROCHLORIDE 2 MG: 2 CAPSULE ORAL at 20:43

## 2024-08-24 RX ADMIN — SERTRALINE 50 MG: 50 TABLET, FILM COATED ORAL at 08:24

## 2024-08-24 RX ADMIN — CEFEPIME 2000 MG: 2 INJECTION, POWDER, FOR SOLUTION INTRAVENOUS at 02:08

## 2024-08-24 RX ADMIN — MORPHINE SULFATE 4 MG: 4 INJECTION, SOLUTION INTRAMUSCULAR; INTRAVENOUS at 12:19

## 2024-08-24 ASSESSMENT — PAIN DESCRIPTION - LOCATION
LOCATION: ABDOMEN

## 2024-08-24 ASSESSMENT — PAIN SCALES - GENERAL
PAINLEVEL_OUTOF10: 5
PAINLEVEL_OUTOF10: 6
PAINLEVEL_OUTOF10: 6
PAINLEVEL_OUTOF10: 7
PAINLEVEL_OUTOF10: 0
PAINLEVEL_OUTOF10: 7
PAINLEVEL_OUTOF10: 8
PAINLEVEL_OUTOF10: 8
PAINLEVEL_OUTOF10: 6
PAINLEVEL_OUTOF10: 7
PAINLEVEL_OUTOF10: 6
PAINLEVEL_OUTOF10: 6
PAINLEVEL_OUTOF10: 3
PAINLEVEL_OUTOF10: 7
PAINLEVEL_OUTOF10: 8
PAINLEVEL_OUTOF10: 5
PAINLEVEL_OUTOF10: 5
PAINLEVEL_OUTOF10: 0
PAINLEVEL_OUTOF10: 8

## 2024-08-24 ASSESSMENT — PAIN DESCRIPTION - ORIENTATION
ORIENTATION: RIGHT
ORIENTATION: RIGHT;LOWER

## 2024-08-24 ASSESSMENT — PAIN - FUNCTIONAL ASSESSMENT

## 2024-08-24 ASSESSMENT — PAIN DESCRIPTION - DESCRIPTORS
DESCRIPTORS: ACHING;DISCOMFORT
DESCRIPTORS: CRAMPING;BURNING;SHARP
DESCRIPTORS: SHARP;CRAMPING;BURNING
DESCRIPTORS: ACHING;CRAMPING;DISCOMFORT
DESCRIPTORS: BURNING;SHARP
DESCRIPTORS: BURNING
DESCRIPTORS: ACHING
DESCRIPTORS: ACHING;DISCOMFORT

## 2024-08-24 NOTE — PROGRESS NOTES
[facility]  [facility]  Progress Note    SUBJECTIVE:  Chief Complaint   Patient presents with    Abdominal Pain     Fistula draining large amount yellow drainage.     Fever     No nausea vomiting or diarrhea resting in bed..  No fever right now    Review of systems:  As stated above in the chief complaint, otherwise negative.    Medications:  Scheduled Meds:   cefepime  2,000 mg IntraVENous Q12H    insulin lispro  0-4 Units SubCUTAneous Q6H    sodium chloride flush  5-40 mL IntraVENous 2 times per day    lidocaine 1 % injection  50 mg IntraDERmal Once    vancomycin  1,250 mg IntraVENous Q24H    fluconazole  400 mg IntraVENous Q24H    fat emulsion  250 mL IntraVENous Q MWF    miconazole   Topical BID    aluminum sulfate-calcium acetate  1 packet Topical 4x Daily    menthol-zinc oxide   Topical 4x Daily    sodium chloride flush  5-40 mL IntraVENous 2 times per day    enoxaparin  40 mg SubCUTAneous Daily    levothyroxine  75 mcg Oral Daily    loperamide  2 mg Oral 4x Daily    sertraline  50 mg Oral Daily    traZODone  100 mg Oral Nightly    pantoprazole  40 mg IntraVENous BID     Continuous Infusions:   PN-Adult 2-in-1 Central Line (Standard) 65 mL/hr at 24 0610    sodium chloride      dextrose      sodium chloride       PRN Meds:sodium chloride flush, sodium chloride, oxyCODONE-acetaminophen, glucose, dextrose bolus **OR** dextrose bolus, glucagon (rDNA), dextrose, ipratropium 0.5 mg-albuterol 2.5 mg, white petrolatum, sodium chloride flush, sodium chloride, potassium chloride **OR** potassium alternative oral replacement **OR** potassium chloride, magnesium sulfate, ondansetron **OR** ondansetron, polyethylene glycol, acetaminophen **OR** acetaminophen, morphine **OR** morphine, diphenhydrAMINE    OBJECTIVE:  /74   Pulse 52   Temp 97.9 °F (36.6 °C) (Temporal)   Resp 18   Ht 1.549 m (5' 1\")   Wt 58.7 kg (129 lb 8 oz)   SpO2 97%   BMI 24.47 kg/m²   Temp  Av °F

## 2024-08-24 NOTE — PROGRESS NOTES
General Surgery Progress Note    Subjective:  No issues overnight. Some soreness at her new femoral line.    Objective:  Vital signs reviewed  Gen: awake, alert, in NAD  HEENT: NCAT  Resp: non-labored breathing  CV: regular rate, distal pulses intact  Abd: soft, nondistended, nontender, colostomy and fistula with sealed appliances  Skin: warm, well perfused    Assessment/Plan:  Sepsis  Port was removed and temporary centra line placed    Per ID, waiting until tomorrow to see clean blood cultures and if so, can replace new port  Briefly discussed with patient PICC vs Port and she does not want a PICC    Likely will schedule for Monday if blood cultures still negative tomorrow    Mendez Pisano, DO  9:44 AM

## 2024-08-24 NOTE — PROGRESS NOTES
[facility] Hospitalist Progress Note    Admitting Date and Time: 8/19/2024 10:53 AM  Admit Dx: Hypokalemia [E87.6]  Lactic acidosis [E87.20]  NEGRO (acute kidney injury) (HCC) [N17.9]  Sepsis (HCC) [A41.9]  Sepsis, due to unspecified organism, unspecified whether acute organ dysfunction present (HCC) [A41.9]    Subjective:  Patient is being followed for Hypokalemia [E87.6]  Lactic acidosis [E87.20]  NEGRO (acute kidney injury) (HCC) [N17.9]  Sepsis (HCC) [A41.9]  Sepsis, due to unspecified organism, unspecified whether acute organ dysfunction present (HCC) [A41.9]   Feeling better without complaints family bedside all questions answered  No CP or SOB  No fever or chills   No uncontrolled pain  No vomiting or diarrhea   No events reported overnight  Chart reviewed      cefepime  2,000 mg IntraVENous Q12H    insulin lispro  0-4 Units SubCUTAneous Q6H    sodium chloride flush  5-40 mL IntraVENous 2 times per day    lidocaine 1 % injection  50 mg IntraDERmal Once    vancomycin  1,250 mg IntraVENous Q24H    fluconazole  400 mg IntraVENous Q24H    fat emulsion  250 mL IntraVENous Q MWF    miconazole   Topical BID    aluminum sulfate-calcium acetate  1 packet Topical 4x Daily    menthol-zinc oxide   Topical 4x Daily    sodium chloride flush  5-40 mL IntraVENous 2 times per day    enoxaparin  40 mg SubCUTAneous Daily    levothyroxine  75 mcg Oral Daily    loperamide  2 mg Oral 4x Daily    sertraline  50 mg Oral Daily    traZODone  100 mg Oral Nightly    pantoprazole  40 mg IntraVENous BID     sodium chloride flush, 5-40 mL, PRN  sodium chloride, , PRN  oxyCODONE-acetaminophen, 1 tablet, Q4H PRN  glucose, 4 tablet, PRN  dextrose bolus, 125 mL, PRN   Or  dextrose bolus, 250 mL, PRN  glucagon (rDNA), 1 mg, PRN  dextrose, , Continuous PRN  ipratropium 0.5 mg-albuterol 2.5 mg, 1 Dose, Q4H PRN  white petrolatum, , BID PRN  sodium chloride flush, 5-40 mL, PRN  sodium chloride, , PRN  potassium chloride, 40 mEq, PRN      1.  Presence of 2 fistula tract relate with group of small bowel segments   which had anastomotic areas, located proximal to the ileostomy loop.  The   fistula opening inferior to the site of the ileostomy 1 in the midline in the   umbilical region and the other in the left paraumbilical area.      2.  In further evaluation of the fistula tract architecture and relationship   with bowel segments can be achieved with fistulogram followed by CT   evaluation, 1 examination for each fetus low opening.      3.  Alternative will be correlation with CT scan abdomen pelvis with oral   contrast new allowing enough time for the oral contrast passed throughout the   entire small bowel.              Assessment:  Principal Problem:    Sepsis (HCC)  Active Problems:    Bacteremia    Crohn's disease (HCC)    Enterocutaneous fistula    Type 2 diabetes mellitus, without long-term current use of insulin (HCC)    Lactic acidosis  Resolved Problems:    * No resolved hospital problems. *      Plan:  Severe sepsis, abd wall cellulitis- fever, tachycardia, infection present on arrival, with HoTN. S/p vancpo+zosyn, now cefepime+fluconazole, ID consult appreciated and adjust as indicated, RVP negative, Bcx+ GPC, may need mediport removal. Does also report having an abscess to superior fistula site which ruptured 1w prior to admit--cefepime, vancomycin, Diflucan    Gram-positive bacteremia for 4 blood cultures positive for GPC Staph epidermidis per PCR likely infected Mediport.  Status post Mediport removal 8/22 ID Consult appreciated dscd case continue vancomycin    Crohn's w/ enterocutaneous fistulas- copious yellow drainage/stool, excoriations to surrounding skin of umbilicus, wound care following, cont barrier cream. Gen Surg c/s, appreciate input. Possible Crohn's flare?, off steroids. NPO with TPN.  General surgery consult appreciated plan for central line today for continued TPN    Colostomy- since bowel perforation during

## 2024-08-24 NOTE — PLAN OF CARE
Problem: ABCDS Injury Assessment  Goal: Absence of physical injury  Outcome: Progressing     Problem: Skin/Tissue Integrity  Goal: Absence of new skin breakdown  Description: 1.  Monitor for areas of redness and/or skin breakdown  2.  Assess vascular access sites hourly  3.  Every 4-6 hours minimum:  Change oxygen saturation probe site  4.  Every 4-6 hours:  If on nasal continuous positive airway pressure, respiratory therapy assess nares and determine need for appliance change or resting period.  Outcome: Progressing     Problem: Discharge Planning  Goal: Discharge to home or other facility with appropriate resources  Outcome: Progressing     Problem: Safety - Adult  Goal: Free from fall injury  Outcome: Progressing     Problem: Chronic Conditions and Co-morbidities  Goal: Patient's chronic conditions and co-morbidity symptoms are monitored and maintained or improved  Outcome: Progressing     Problem: Pain  Goal: Verbalizes/displays adequate comfort level or baseline comfort level  Outcome: Progressing     Problem: Nutrition Deficit:  Goal: Optimize nutritional status  Outcome: Progressing  Flowsheets (Taken 8/23/2024 1242 by Abby Dorsey, RD, CNSC, LD)  Nutrient intake appropriate for improving, restoring, or maintaining nutritional needs:   Assess nutritional status and recommend course of action   Recommend, monitor, and adjust tube feedings and TPN/PPN based on assessed needs

## 2024-08-24 NOTE — PROGRESS NOTES
Pharmacy Consultation Note  (Antibiotic Dosing and Monitoring)    Initial consult date: 8/21  Consulting physician/provider: Dmitri  Drug: Vancomycin  Indication: Bloodstream infection    Age/  Gender Height Weight IBW  Allergy Information   59 y.o./female 154.9 cm (5' 1\") 68 kg (150 lb)     Ideal body weight: 47.8 kg (105 lb 6.1 oz)  Adjusted ideal body weight: 52.2 kg (115 lb 0.4 oz)   Dilaudid [hydromorphone hcl], Cocoa, Lactose, Lyrica [pregabalin], Phenergan [promethazine hcl], and Phenytoin sodium extended      Renal Function:  Recent Labs     08/22/24  0800   BUN 23*   CREATININE 1.0       Intake/Output Summary (Last 24 hours) at 8/24/2024 1055  Last data filed at 8/24/2024 0610  Gross per 24 hour   Intake 3895.41 ml   Output 825 ml   Net 3070.41 ml       Vancomycin Monitoring:  Trough:    Recent Labs     08/24/24  0412 08/24/24  0648   VANCOTROUGH 10.3 9.3     Random:  No results for input(s): \"VANCORANDOM\" in the last 72 hours.      Assessment:  Patient is a 59 y.o. female who has been initiated on vancomycin  Estimated Creatinine Clearance: 51 mL/min (based on SCr of 1 mg/dL).  No history of vancomycin levels in EPIC  Patient received vancomycin 1250 mg IV x 1 on 8/19 (1457)  Patient received no vancomycin on 8/20.    Plan:  Continue vancomycin 1250 mg IV q24h   (predicted AUC/JOSSE = 464, Tr =10.5)   Will continue to monitor renal function   Pharmacy to follow      Adilene Jiménez RPH 8/24/2024 10:55 AM    ALTAGRACIA: [phone]  SEY: [phone]  SJW: [phone]

## 2024-08-25 LAB
ALBUMIN SERPL-MCNC: 3.4 G/DL (ref 3.5–5.2)
ALP SERPL-CCNC: 86 U/L (ref 35–104)
ALT SERPL-CCNC: 27 U/L (ref 0–32)
AST SERPL-CCNC: 27 U/L (ref 0–31)
BILIRUB DIRECT SERPL-MCNC: <0.2 MG/DL (ref 0–0.3)
BILIRUB INDIRECT SERPL-MCNC: ABNORMAL MG/DL (ref 0–1)
BILIRUB SERPL-MCNC: 0.3 MG/DL (ref 0–1.2)
GLUCOSE BLD-MCNC: 102 MG/DL (ref 74–99)
GLUCOSE BLD-MCNC: 104 MG/DL (ref 74–99)
GLUCOSE BLD-MCNC: 120 MG/DL (ref 74–99)
GLUCOSE BLD-MCNC: 97 MG/DL (ref 74–99)
GLUCOSE BLD-MCNC: 98 MG/DL (ref 74–99)
MAGNESIUM SERPL-MCNC: 1.9 MG/DL (ref 1.6–2.6)
PHOSPHATE SERPL-MCNC: 2 MG/DL (ref 2.5–4.5)
PROT SERPL-MCNC: 6.5 G/DL (ref 6.4–8.3)

## 2024-08-25 PROCEDURE — 6360000002 HC RX W HCPCS: Performed by: STUDENT IN AN ORGANIZED HEALTH CARE EDUCATION/TRAINING PROGRAM

## 2024-08-25 PROCEDURE — 82962 GLUCOSE BLOOD TEST: CPT

## 2024-08-25 PROCEDURE — 80076 HEPATIC FUNCTION PANEL: CPT

## 2024-08-25 PROCEDURE — 6370000000 HC RX 637 (ALT 250 FOR IP): Performed by: STUDENT IN AN ORGANIZED HEALTH CARE EDUCATION/TRAINING PROGRAM

## 2024-08-25 PROCEDURE — 2500000003 HC RX 250 WO HCPCS: Performed by: SURGERY

## 2024-08-25 PROCEDURE — 6360000002 HC RX W HCPCS: Performed by: INTERNAL MEDICINE

## 2024-08-25 PROCEDURE — 6360000002 HC RX W HCPCS: Performed by: SPECIALIST

## 2024-08-25 PROCEDURE — 2580000003 HC RX 258: Performed by: SURGERY

## 2024-08-25 PROCEDURE — 99232 SBSQ HOSP IP/OBS MODERATE 35: CPT | Performed by: INTERNAL MEDICINE

## 2024-08-25 PROCEDURE — 6370000000 HC RX 637 (ALT 250 FOR IP): Performed by: INTERNAL MEDICINE

## 2024-08-25 PROCEDURE — 2580000003 HC RX 258: Performed by: SPECIALIST

## 2024-08-25 PROCEDURE — 6360000002 HC RX W HCPCS: Performed by: SURGERY

## 2024-08-25 PROCEDURE — 1200000000 HC SEMI PRIVATE

## 2024-08-25 PROCEDURE — 84100 ASSAY OF PHOSPHORUS: CPT

## 2024-08-25 PROCEDURE — 83735 ASSAY OF MAGNESIUM: CPT

## 2024-08-25 PROCEDURE — 2580000003 HC RX 258: Performed by: STUDENT IN AN ORGANIZED HEALTH CARE EDUCATION/TRAINING PROGRAM

## 2024-08-25 RX ADMIN — FLUCONAZOLE 400 MG: 400 INJECTION, SOLUTION INTRAVENOUS at 11:33

## 2024-08-25 RX ADMIN — OXYCODONE HYDROCHLORIDE AND ACETAMINOPHEN 1 TABLET: 5; 325 TABLET ORAL at 13:12

## 2024-08-25 RX ADMIN — MORPHINE SULFATE 4 MG: 4 INJECTION, SOLUTION INTRAMUSCULAR; INTRAVENOUS at 06:52

## 2024-08-25 RX ADMIN — SODIUM CHLORIDE, PRESERVATIVE FREE 10 ML: 5 INJECTION INTRAVENOUS at 20:59

## 2024-08-25 RX ADMIN — OXYCODONE HYDROCHLORIDE AND ACETAMINOPHEN 1 TABLET: 5; 325 TABLET ORAL at 22:09

## 2024-08-25 RX ADMIN — ANORECTAL OINTMENT: 15.7; .44; 24; 20.6 OINTMENT TOPICAL at 15:49

## 2024-08-25 RX ADMIN — OXYCODONE HYDROCHLORIDE AND ACETAMINOPHEN 1 TABLET: 5; 325 TABLET ORAL at 17:38

## 2024-08-25 RX ADMIN — MORPHINE SULFATE 4 MG: 4 INJECTION, SOLUTION INTRAMUSCULAR; INTRAVENOUS at 11:34

## 2024-08-25 RX ADMIN — MORPHINE SULFATE 4 MG: 4 INJECTION, SOLUTION INTRAMUSCULAR; INTRAVENOUS at 04:43

## 2024-08-25 RX ADMIN — LOPERAMIDE HYDROCHLORIDE 2 MG: 2 CAPSULE ORAL at 07:29

## 2024-08-25 RX ADMIN — VANCOMYCIN HYDROCHLORIDE 1250 MG: 10 INJECTION, POWDER, LYOPHILIZED, FOR SOLUTION INTRAVENOUS at 07:41

## 2024-08-25 RX ADMIN — PANTOPRAZOLE SODIUM 40 MG: 40 INJECTION, POWDER, FOR SOLUTION INTRAVENOUS at 07:29

## 2024-08-25 RX ADMIN — LOPERAMIDE HYDROCHLORIDE 2 MG: 2 CAPSULE ORAL at 20:58

## 2024-08-25 RX ADMIN — OXYCODONE HYDROCHLORIDE AND ACETAMINOPHEN 1 TABLET: 5; 325 TABLET ORAL at 07:30

## 2024-08-25 RX ADMIN — MORPHINE SULFATE 4 MG: 4 INJECTION, SOLUTION INTRAMUSCULAR; INTRAVENOUS at 09:25

## 2024-08-25 RX ADMIN — MORPHINE SULFATE 2 MG: 2 INJECTION, SOLUTION INTRAMUSCULAR; INTRAVENOUS at 15:45

## 2024-08-25 RX ADMIN — CEFEPIME 2000 MG: 2 INJECTION, POWDER, FOR SOLUTION INTRAVENOUS at 06:08

## 2024-08-25 RX ADMIN — MORPHINE SULFATE 4 MG: 4 INJECTION, SOLUTION INTRAMUSCULAR; INTRAVENOUS at 00:31

## 2024-08-25 RX ADMIN — TRAZODONE HYDROCHLORIDE 100 MG: 50 TABLET ORAL at 20:59

## 2024-08-25 RX ADMIN — PANTOPRAZOLE SODIUM 40 MG: 40 INJECTION, POWDER, FOR SOLUTION INTRAVENOUS at 20:59

## 2024-08-25 RX ADMIN — LEVOTHYROXINE SODIUM 75 MCG: 75 TABLET ORAL at 06:04

## 2024-08-25 RX ADMIN — LOPERAMIDE HYDROCHLORIDE 2 MG: 2 CAPSULE ORAL at 15:46

## 2024-08-25 RX ADMIN — OXYCODONE HYDROCHLORIDE AND ACETAMINOPHEN 1 TABLET: 5; 325 TABLET ORAL at 03:30

## 2024-08-25 RX ADMIN — SODIUM CHLORIDE, PRESERVATIVE FREE 10 ML: 5 INJECTION INTRAVENOUS at 07:34

## 2024-08-25 RX ADMIN — MORPHINE SULFATE 4 MG: 4 INJECTION, SOLUTION INTRAMUSCULAR; INTRAVENOUS at 20:59

## 2024-08-25 RX ADMIN — SODIUM CHLORIDE, PRESERVATIVE FREE 10 ML: 5 INJECTION INTRAVENOUS at 07:35

## 2024-08-25 RX ADMIN — SERTRALINE 50 MG: 50 TABLET, FILM COATED ORAL at 07:29

## 2024-08-25 RX ADMIN — ENOXAPARIN SODIUM 40 MG: 100 INJECTION SUBCUTANEOUS at 07:30

## 2024-08-25 RX ADMIN — CALCIUM GLUCONATE: 98 INJECTION, SOLUTION INTRAVENOUS at 17:30

## 2024-08-25 RX ADMIN — MORPHINE SULFATE 4 MG: 4 INJECTION, SOLUTION INTRAMUSCULAR; INTRAVENOUS at 23:52

## 2024-08-25 RX ADMIN — MORPHINE SULFATE 4 MG: 4 INJECTION, SOLUTION INTRAMUSCULAR; INTRAVENOUS at 18:41

## 2024-08-25 RX ADMIN — CEFEPIME 2000 MG: 2 INJECTION, POWDER, FOR SOLUTION INTRAVENOUS at 17:30

## 2024-08-25 ASSESSMENT — PAIN SCALES - GENERAL
PAINLEVEL_OUTOF10: 8
PAINLEVEL_OUTOF10: 7
PAINLEVEL_OUTOF10: 5
PAINLEVEL_OUTOF10: 6
PAINLEVEL_OUTOF10: 8
PAINLEVEL_OUTOF10: 6
PAINLEVEL_OUTOF10: 7
PAINLEVEL_OUTOF10: 6
PAINLEVEL_OUTOF10: 7
PAINLEVEL_OUTOF10: 6
PAINLEVEL_OUTOF10: 7
PAINLEVEL_OUTOF10: 8
PAINLEVEL_OUTOF10: 8

## 2024-08-25 ASSESSMENT — PAIN DESCRIPTION - DESCRIPTORS
DESCRIPTORS: ACHING;CRAMPING;DISCOMFORT
DESCRIPTORS: CRAMPING;ACHING;DISCOMFORT
DESCRIPTORS: ACHING;CRAMPING;DISCOMFORT
DESCRIPTORS: BURNING;CRAMPING;SHARP
DESCRIPTORS: ACHING;CRAMPING;DISCOMFORT
DESCRIPTORS: ACHING
DESCRIPTORS: ACHING
DESCRIPTORS: ACHING;CRAMPING;DISCOMFORT
DESCRIPTORS: BURNING;CRAMPING;SHARP
DESCRIPTORS: ACHING;CRAMPING;DISCOMFORT
DESCRIPTORS: BURNING;CRAMPING;SHARP
DESCRIPTORS: BURNING;CRAMPING;SHARP

## 2024-08-25 ASSESSMENT — PAIN DESCRIPTION - LOCATION
LOCATION: ABDOMEN

## 2024-08-25 ASSESSMENT — PAIN DESCRIPTION - ORIENTATION
ORIENTATION: RIGHT
ORIENTATION: MID
ORIENTATION: MID
ORIENTATION: RIGHT

## 2024-08-25 ASSESSMENT — PAIN - FUNCTIONAL ASSESSMENT
PAIN_FUNCTIONAL_ASSESSMENT: ACTIVITIES ARE NOT PREVENTED
PAIN_FUNCTIONAL_ASSESSMENT: PREVENTS OR INTERFERES SOME ACTIVE ACTIVITIES AND ADLS
PAIN_FUNCTIONAL_ASSESSMENT: PREVENTS OR INTERFERES WITH MANY ACTIVE NOT PASSIVE ACTIVITIES
PAIN_FUNCTIONAL_ASSESSMENT: ACTIVITIES ARE NOT PREVENTED
PAIN_FUNCTIONAL_ASSESSMENT: PREVENTS OR INTERFERES SOME ACTIVE ACTIVITIES AND ADLS
PAIN_FUNCTIONAL_ASSESSMENT: PREVENTS OR INTERFERES WITH MANY ACTIVE NOT PASSIVE ACTIVITIES
PAIN_FUNCTIONAL_ASSESSMENT: PREVENTS OR INTERFERES SOME ACTIVE ACTIVITIES AND ADLS
PAIN_FUNCTIONAL_ASSESSMENT: PREVENTS OR INTERFERES SOME ACTIVE ACTIVITIES AND ADLS
PAIN_FUNCTIONAL_ASSESSMENT: ACTIVITIES ARE NOT PREVENTED
PAIN_FUNCTIONAL_ASSESSMENT: ACTIVITIES ARE NOT PREVENTED
PAIN_FUNCTIONAL_ASSESSMENT: PREVENTS OR INTERFERES WITH MANY ACTIVE NOT PASSIVE ACTIVITIES

## 2024-08-25 NOTE — PLAN OF CARE
Problem: ABCDS Injury Assessment  Goal: Absence of physical injury  8/24/2024 2138 by Luzmaria Durham RN  Outcome: Progressing  8/24/2024 0955 by Maki Louis RN  Outcome: Progressing     Problem: Skin/Tissue Integrity  Goal: Absence of new skin breakdown  Description: 1.  Monitor for areas of redness and/or skin breakdown  2.  Assess vascular access sites hourly  3.  Every 4-6 hours minimum:  Change oxygen saturation probe site  4.  Every 4-6 hours:  If on nasal continuous positive airway pressure, respiratory therapy assess nares and determine need for appliance change or resting period.  8/24/2024 2138 by Luzmaria Durham RN  Outcome: Progressing  8/24/2024 0955 by Maki Louis RN  Outcome: Progressing     Problem: Discharge Planning  Goal: Discharge to home or other facility with appropriate resources  8/24/2024 2138 by Luzmaria Durham RN  Outcome: Progressing  8/24/2024 0955 by Maki Louis RN  Outcome: Progressing     Problem: Safety - Adult  Goal: Free from fall injury  8/24/2024 2138 by Luzmaria Durham RN  Outcome: Progressing  8/24/2024 0955 by Maki Louis RN  Outcome: Progressing     Problem: Chronic Conditions and Co-morbidities  Goal: Patient's chronic conditions and co-morbidity symptoms are monitored and maintained or improved  Outcome: Progressing     Problem: Pain  Goal: Verbalizes/displays adequate comfort level or baseline comfort level  Outcome: Progressing     Problem: Nutrition Deficit:  Goal: Optimize nutritional status  Outcome: Progressing

## 2024-08-25 NOTE — PROGRESS NOTES
[facility] Hospitalist Progress Note    Admitting Date and Time: 8/19/2024 10:53 AM  Admit Dx: Hypokalemia [E87.6]  Lactic acidosis [E87.20]  NEGRO (acute kidney injury) (HCC) [N17.9]  Sepsis (HCC) [A41.9]  Sepsis, due to unspecified organism, unspecified whether acute organ dysfunction present (HCC) [A41.9]    Subjective:  Patient is being followed for Hypokalemia [E87.6]  Lactic acidosis [E87.20]  NEGRO (acute kidney injury) (HCC) [N17.9]  Sepsis (HCC) [A41.9]  Sepsis, due to unspecified organism, unspecified whether acute organ dysfunction present (HCC) [A41.9]   Feeling better without complaints  No CP or SOB  No fever or chills   No uncontrolled pain  No vomiting or diarrhea   No events reported overnight  Chart reviewed      cefepime  2,000 mg IntraVENous Q12H    insulin lispro  0-4 Units SubCUTAneous Q6H    sodium chloride flush  5-40 mL IntraVENous 2 times per day    lidocaine 1 % injection  50 mg IntraDERmal Once    vancomycin  1,250 mg IntraVENous Q24H    fluconazole  400 mg IntraVENous Q24H    fat emulsion  250 mL IntraVENous Q MWF    miconazole   Topical BID    aluminum sulfate-calcium acetate  1 packet Topical 4x Daily    menthol-zinc oxide   Topical 4x Daily    sodium chloride flush  5-40 mL IntraVENous 2 times per day    enoxaparin  40 mg SubCUTAneous Daily    levothyroxine  75 mcg Oral Daily    loperamide  2 mg Oral 4x Daily    sertraline  50 mg Oral Daily    traZODone  100 mg Oral Nightly    pantoprazole  40 mg IntraVENous BID     sodium chloride flush, 5-40 mL, PRN  sodium chloride, , PRN  oxyCODONE-acetaminophen, 1 tablet, Q4H PRN  glucose, 4 tablet, PRN  dextrose bolus, 125 mL, PRN   Or  dextrose bolus, 250 mL, PRN  glucagon (rDNA), 1 mg, PRN  dextrose, , Continuous PRN  ipratropium 0.5 mg-albuterol 2.5 mg, 1 Dose, Q4H PRN  white petrolatum, , BID PRN  sodium chloride flush, 5-40 mL, PRN  sodium chloride, , PRN  potassium chloride, 40 mEq, PRN   Or  potassium alternative oral replacement, 40  anastomotic areas, located proximal to the ileostomy loop.  The   fistula opening inferior to the site of the ileostomy 1 in the midline in the   umbilical region and the other in the left paraumbilical area.      2.  In further evaluation of the fistula tract architecture and relationship   with bowel segments can be achieved with fistulogram followed by CT   evaluation, 1 examination for each fetus low opening.      3.  Alternative will be correlation with CT scan abdomen pelvis with oral   contrast new allowing enough time for the oral contrast passed throughout the   entire small bowel.              Assessment:  Principal Problem:    Sepsis (HCC)  Active Problems:    Bacteremia    Crohn's disease (HCC)    Enterocutaneous fistula    Type 2 diabetes mellitus, without long-term current use of insulin (HCC)    Lactic acidosis  Resolved Problems:    * No resolved hospital problems. *      Plan:  Severe sepsis, abd wall cellulitis- fever, tachycardia, infection present on arrival, with HoTN. S/p vancpo+zosyn, now cefepime+fluconazole, ID consult appreciated and adjust as indicated, RVP negative, Bcx+ GPC, may need mediport removal. Does also report having an abscess to superior fistula site which ruptured 1w prior to admit--cefepime, vancomycin, Diflucan    Gram-positive bacteremia for 4 blood cultures positive for GPC Staph epidermidis per PCR likely infected Mediport.  Status post Mediport removal 8/22 temporary TLC placed.  Plan for Mediport placement Monday if cultures remain negative  ID Consult appreciated dscd case continue vancomycin    Crohn's w/ enterocutaneous fistulas- copious yellow drainage/stool, excoriations to surrounding skin of umbilicus, wound care following, cont barrier cream. Gen Surg c/s, appreciate input. Possible Crohn's flare?, off steroids. NPO with TPN.  General surgery consult appreciated plan for central line today for continued TPN    Colostomy- since bowel perforation during

## 2024-08-25 NOTE — PROGRESS NOTES
Pharmacy Consultation Note  (Antibiotic Dosing and Monitoring)    Initial consult date: 8/21  Consulting physician/provider: Dmitri  Drug: Vancomycin  Indication: Bloodstream infection    Age/  Gender Height Weight IBW  Allergy Information   59 y.o./female 154.9 cm (5' 1\") 68 kg (150 lb)     Ideal body weight: 47.8 kg (105 lb 6.1 oz)  Adjusted ideal body weight: 52.8 kg (116 lb 4.9 oz)   Dilaudid [hydromorphone hcl], Cocoa, Lactose, Lyrica [pregabalin], Phenergan [promethazine hcl], and Phenytoin sodium extended      Renal Function:  Recent Labs     08/24/24  0648   BUN 29*   CREATININE 1.1*       Intake/Output Summary (Last 24 hours) at 8/25/2024 0819  Last data filed at 8/25/2024 0452  Gross per 24 hour   Intake 2289.31 ml   Output 1300 ml   Net 989.31 ml       Vancomycin Monitoring:  Trough:    Recent Labs     08/24/24  0412 08/24/24  0648   VANCOTROUGH 10.3 9.3     Random:  No results for input(s): \"VANCORANDOM\" in the last 72 hours.      Assessment:  Patient is a 59 y.o. female who has been initiated on vancomycin  Estimated Creatinine Clearance: 47 mL/min (A) (based on SCr of 1.1 mg/dL (H)).  No history of vancomycin levels in EPIC  Patient received vancomycin 1250 mg IV x 1 on 8/19 (1457)  Patient received no vancomycin on 8/20.    Plan:  Continue vancomycin 1250 mg IV q24h   (predicted AUC/JOSSE = 464, Tr =10.5)   Will continue to monitor renal function   Pharmacy to follow    Berenice Armas RPH, PharmD, BCACP, 8/25/2024 8:25 AM      ALTAGRACIA: [phone]  SEY: [phone]  SJW: [phone]

## 2024-08-25 NOTE — PROGRESS NOTES
[facility]  [facility]  Progress Note    SUBJECTIVE:  Chief Complaint   Patient presents with    Abdominal Pain     Fistula draining large amount yellow drainage.     Fever     Resting in bed no nausea or vomiting.  Little bit of right chest surgical area discomfort and tenderness with a ice pack on it but otherwise no changes.    Review of systems:  As stated above in the chief complaint, otherwise negative.    Medications:  Scheduled Meds:   cefepime  2,000 mg IntraVENous Q12H    insulin lispro  0-4 Units SubCUTAneous Q6H    sodium chloride flush  5-40 mL IntraVENous 2 times per day    lidocaine 1 % injection  50 mg IntraDERmal Once    vancomycin  1,250 mg IntraVENous Q24H    fluconazole  400 mg IntraVENous Q24H    fat emulsion  250 mL IntraVENous Q MWF    miconazole   Topical BID    aluminum sulfate-calcium acetate  1 packet Topical 4x Daily    menthol-zinc oxide   Topical 4x Daily    sodium chloride flush  5-40 mL IntraVENous 2 times per day    enoxaparin  40 mg SubCUTAneous Daily    levothyroxine  75 mcg Oral Daily    loperamide  2 mg Oral 4x Daily    sertraline  50 mg Oral Daily    traZODone  100 mg Oral Nightly    pantoprazole  40 mg IntraVENous BID     Continuous Infusions:   PN-Adult 2-in-1 Central Line (Standard) 65 mL/hr at 08/25/24 0452    sodium chloride      dextrose      sodium chloride       PRN Meds:sodium chloride flush, sodium chloride, oxyCODONE-acetaminophen, glucose, dextrose bolus **OR** dextrose bolus, glucagon (rDNA), dextrose, ipratropium 0.5 mg-albuterol 2.5 mg, white petrolatum, sodium chloride flush, sodium chloride, potassium chloride **OR** potassium alternative oral replacement **OR** potassium chloride, magnesium sulfate, ondansetron **OR** ondansetron, polyethylene glycol, acetaminophen **OR** acetaminophen, morphine **OR** morphine, diphenhydrAMINE    OBJECTIVE:  BP (!) 145/69   Pulse (!) 48   Temp 97.2 °F (36.2 °C) (Temporal)   Resp 18   Ht

## 2024-08-25 NOTE — PROGRESS NOTES
General Surgery:    Will plan for mediport placement tomorrow in OR as long as blood cultures remain negative per ID recommendations.  Continue antibiotics per ID  Continue TPN through CVC    Mendez Pisano DO

## 2024-08-26 ENCOUNTER — APPOINTMENT (OUTPATIENT)
Dept: GENERAL RADIOLOGY | Age: 59
DRG: 981 | End: 2024-08-26
Payer: MEDICARE

## 2024-08-26 ENCOUNTER — ANESTHESIA (OUTPATIENT)
Dept: OPERATING ROOM | Age: 59
DRG: 853 | End: 2024-08-26
Payer: MEDICARE

## 2024-08-26 ENCOUNTER — ANESTHESIA EVENT (OUTPATIENT)
Dept: OPERATING ROOM | Age: 59
DRG: 853 | End: 2024-08-26
Payer: MEDICARE

## 2024-08-26 LAB
ALBUMIN SERPL-MCNC: 3.7 G/DL (ref 3.5–5.2)
ALP SERPL-CCNC: 102 U/L (ref 35–104)
ALT SERPL-CCNC: 33 U/L (ref 0–32)
AST SERPL-CCNC: 27 U/L (ref 0–31)
BILIRUB DIRECT SERPL-MCNC: <0.2 MG/DL (ref 0–0.3)
BILIRUB INDIRECT SERPL-MCNC: ABNORMAL MG/DL (ref 0–1)
BILIRUB SERPL-MCNC: 0.3 MG/DL (ref 0–1.2)
GLUCOSE BLD-MCNC: 100 MG/DL (ref 74–99)
GLUCOSE BLD-MCNC: 111 MG/DL (ref 74–99)
GLUCOSE BLD-MCNC: 98 MG/DL (ref 74–99)
MAGNESIUM SERPL-MCNC: 1.9 MG/DL (ref 1.6–2.6)
PHOSPHATE SERPL-MCNC: 2.2 MG/DL (ref 2.5–4.5)
PROT SERPL-MCNC: 7.2 G/DL (ref 6.4–8.3)

## 2024-08-26 PROCEDURE — 97535 SELF CARE MNGMENT TRAINING: CPT

## 2024-08-26 PROCEDURE — 6370000000 HC RX 637 (ALT 250 FOR IP): Performed by: INTERNAL MEDICINE

## 2024-08-26 PROCEDURE — 7100000011 HC PHASE II RECOVERY - ADDTL 15 MIN: Performed by: STUDENT IN AN ORGANIZED HEALTH CARE EDUCATION/TRAINING PROGRAM

## 2024-08-26 PROCEDURE — 2580000003 HC RX 258: Performed by: STUDENT IN AN ORGANIZED HEALTH CARE EDUCATION/TRAINING PROGRAM

## 2024-08-26 PROCEDURE — 6360000002 HC RX W HCPCS: Performed by: STUDENT IN AN ORGANIZED HEALTH CARE EDUCATION/TRAINING PROGRAM

## 2024-08-26 PROCEDURE — 6360000002 HC RX W HCPCS: Performed by: NURSE ANESTHETIST, CERTIFIED REGISTERED

## 2024-08-26 PROCEDURE — 2500000003 HC RX 250 WO HCPCS: Performed by: NURSE ANESTHETIST, CERTIFIED REGISTERED

## 2024-08-26 PROCEDURE — 2500000003 HC RX 250 WO HCPCS: Performed by: STUDENT IN AN ORGANIZED HEALTH CARE EDUCATION/TRAINING PROGRAM

## 2024-08-26 PROCEDURE — 0JPT0WZ REMOVAL OF TOTALLY IMPLANTABLE VASCULAR ACCESS DEVICE FROM TRUNK SUBCUTANEOUS TISSUE AND FASCIA, OPEN APPROACH: ICD-10-PCS | Performed by: STUDENT IN AN ORGANIZED HEALTH CARE EDUCATION/TRAINING PROGRAM

## 2024-08-26 PROCEDURE — 1200000000 HC SEMI PRIVATE

## 2024-08-26 PROCEDURE — 6360000002 HC RX W HCPCS: Performed by: SPECIALIST

## 2024-08-26 PROCEDURE — 99232 SBSQ HOSP IP/OBS MODERATE 35: CPT | Performed by: INTERNAL MEDICINE

## 2024-08-26 PROCEDURE — 02HV33Z INSERTION OF INFUSION DEVICE INTO SUPERIOR VENA CAVA, PERCUTANEOUS APPROACH: ICD-10-PCS | Performed by: STUDENT IN AN ORGANIZED HEALTH CARE EDUCATION/TRAINING PROGRAM

## 2024-08-26 PROCEDURE — 7100000010 HC PHASE II RECOVERY - FIRST 15 MIN: Performed by: STUDENT IN AN ORGANIZED HEALTH CARE EDUCATION/TRAINING PROGRAM

## 2024-08-26 PROCEDURE — 6360000002 HC RX W HCPCS: Performed by: INTERNAL MEDICINE

## 2024-08-26 PROCEDURE — 6370000000 HC RX 637 (ALT 250 FOR IP): Performed by: STUDENT IN AN ORGANIZED HEALTH CARE EDUCATION/TRAINING PROGRAM

## 2024-08-26 PROCEDURE — 3700000001 HC ADD 15 MINUTES (ANESTHESIA): Performed by: STUDENT IN AN ORGANIZED HEALTH CARE EDUCATION/TRAINING PROGRAM

## 2024-08-26 PROCEDURE — 97165 OT EVAL LOW COMPLEX 30 MIN: CPT

## 2024-08-26 PROCEDURE — 36561 INSERT TUNNELED CV CATH: CPT | Performed by: STUDENT IN AN ORGANIZED HEALTH CARE EDUCATION/TRAINING PROGRAM

## 2024-08-26 PROCEDURE — 71045 X-RAY EXAM CHEST 1 VIEW: CPT

## 2024-08-26 PROCEDURE — 82962 GLUCOSE BLOOD TEST: CPT

## 2024-08-26 PROCEDURE — 3700000000 HC ANESTHESIA ATTENDED CARE: Performed by: STUDENT IN AN ORGANIZED HEALTH CARE EDUCATION/TRAINING PROGRAM

## 2024-08-26 PROCEDURE — 83735 ASSAY OF MAGNESIUM: CPT

## 2024-08-26 PROCEDURE — 2580000003 HC RX 258

## 2024-08-26 PROCEDURE — 2500000003 HC RX 250 WO HCPCS

## 2024-08-26 PROCEDURE — C1788 PORT, INDWELLING, IMP: HCPCS | Performed by: STUDENT IN AN ORGANIZED HEALTH CARE EDUCATION/TRAINING PROGRAM

## 2024-08-26 PROCEDURE — 77001 FLUOROGUIDE FOR VEIN DEVICE: CPT | Performed by: STUDENT IN AN ORGANIZED HEALTH CARE EDUCATION/TRAINING PROGRAM

## 2024-08-26 PROCEDURE — 3E0336Z INTRODUCTION OF NUTRITIONAL SUBSTANCE INTO PERIPHERAL VEIN, PERCUTANEOUS APPROACH: ICD-10-PCS | Performed by: STUDENT IN AN ORGANIZED HEALTH CARE EDUCATION/TRAINING PROGRAM

## 2024-08-26 PROCEDURE — 84100 ASSAY OF PHOSPHORUS: CPT

## 2024-08-26 PROCEDURE — B5181ZA FLUOROSCOPY OF SUPERIOR VENA CAVA USING LOW OSMOLAR CONTRAST, GUIDANCE: ICD-10-PCS | Performed by: STUDENT IN AN ORGANIZED HEALTH CARE EDUCATION/TRAINING PROGRAM

## 2024-08-26 PROCEDURE — 6360000002 HC RX W HCPCS

## 2024-08-26 PROCEDURE — 2709999900 HC NON-CHARGEABLE SUPPLY: Performed by: STUDENT IN AN ORGANIZED HEALTH CARE EDUCATION/TRAINING PROGRAM

## 2024-08-26 PROCEDURE — 2580000003 HC RX 258: Performed by: SPECIALIST

## 2024-08-26 PROCEDURE — 3600000003 HC SURGERY LEVEL 3 BASE: Performed by: STUDENT IN AN ORGANIZED HEALTH CARE EDUCATION/TRAINING PROGRAM

## 2024-08-26 PROCEDURE — 80076 HEPATIC FUNCTION PANEL: CPT

## 2024-08-26 PROCEDURE — 0JH60WZ INSERTION OF TOTALLY IMPLANTABLE VASCULAR ACCESS DEVICE INTO CHEST SUBCUTANEOUS TISSUE AND FASCIA, OPEN APPROACH: ICD-10-PCS | Performed by: STUDENT IN AN ORGANIZED HEALTH CARE EDUCATION/TRAINING PROGRAM

## 2024-08-26 PROCEDURE — 3600000013 HC SURGERY LEVEL 3 ADDTL 15MIN: Performed by: STUDENT IN AN ORGANIZED HEALTH CARE EDUCATION/TRAINING PROGRAM

## 2024-08-26 DEVICE — VACCESS CT LOW-PROFILE POWER-INJECTABLE IMPLANTABLE PORT (AIR GUARD) (8F) (WITH SUTURE PLUGS)
Type: IMPLANTABLE DEVICE | Site: CHEST | Status: FUNCTIONAL
Brand: VACCESS

## 2024-08-26 RX ORDER — HEPARIN 100 UNIT/ML
SYRINGE INTRAVENOUS PRN
Status: DISCONTINUED | OUTPATIENT
Start: 2024-08-26 | End: 2024-08-26 | Stop reason: ALTCHOICE

## 2024-08-26 RX ORDER — OXYCODONE HYDROCHLORIDE 5 MG/1
5 TABLET ORAL
Status: DISCONTINUED | OUTPATIENT
Start: 2024-08-26 | End: 2024-08-26 | Stop reason: HOSPADM

## 2024-08-26 RX ORDER — BUPIVACAINE HYDROCHLORIDE AND EPINEPHRINE 2.5; 5 MG/ML; UG/ML
INJECTION, SOLUTION EPIDURAL; INFILTRATION; INTRACAUDAL; PERINEURAL PRN
Status: DISCONTINUED | OUTPATIENT
Start: 2024-08-26 | End: 2024-08-26 | Stop reason: ALTCHOICE

## 2024-08-26 RX ORDER — GLYCOPYRROLATE 0.2 MG/ML
INJECTION INTRAMUSCULAR; INTRAVENOUS PRN
Status: DISCONTINUED | OUTPATIENT
Start: 2024-08-26 | End: 2024-08-26 | Stop reason: SDUPTHER

## 2024-08-26 RX ORDER — MIDAZOLAM HYDROCHLORIDE 1 MG/ML
INJECTION INTRAMUSCULAR; INTRAVENOUS PRN
Status: DISCONTINUED | OUTPATIENT
Start: 2024-08-26 | End: 2024-08-26 | Stop reason: SDUPTHER

## 2024-08-26 RX ORDER — SODIUM CHLORIDE 0.9 % (FLUSH) 0.9 %
5-40 SYRINGE (ML) INJECTION PRN
Status: DISCONTINUED | OUTPATIENT
Start: 2024-08-26 | End: 2024-08-26 | Stop reason: HOSPADM

## 2024-08-26 RX ORDER — ONDANSETRON 2 MG/ML
INJECTION INTRAMUSCULAR; INTRAVENOUS PRN
Status: DISCONTINUED | OUTPATIENT
Start: 2024-08-26 | End: 2024-08-26 | Stop reason: SDUPTHER

## 2024-08-26 RX ORDER — SODIUM CHLORIDE 0.9 % (FLUSH) 0.9 %
5-40 SYRINGE (ML) INJECTION EVERY 12 HOURS SCHEDULED
Status: DISCONTINUED | OUTPATIENT
Start: 2024-08-26 | End: 2024-08-26 | Stop reason: HOSPADM

## 2024-08-26 RX ORDER — FENTANYL CITRATE 50 UG/ML
50 INJECTION, SOLUTION INTRAMUSCULAR; INTRAVENOUS EVERY 5 MIN PRN
Status: DISCONTINUED | OUTPATIENT
Start: 2024-08-26 | End: 2024-08-26 | Stop reason: HOSPADM

## 2024-08-26 RX ORDER — FENTANYL CITRATE 50 UG/ML
INJECTION, SOLUTION INTRAMUSCULAR; INTRAVENOUS PRN
Status: DISCONTINUED | OUTPATIENT
Start: 2024-08-26 | End: 2024-08-26 | Stop reason: SDUPTHER

## 2024-08-26 RX ORDER — PROCHLORPERAZINE EDISYLATE 5 MG/ML
5 INJECTION INTRAMUSCULAR; INTRAVENOUS
Status: DISCONTINUED | OUTPATIENT
Start: 2024-08-26 | End: 2024-08-26 | Stop reason: HOSPADM

## 2024-08-26 RX ORDER — SODIUM CHLORIDE 9 MG/ML
INJECTION, SOLUTION INTRAVENOUS PRN
Status: DISCONTINUED | OUTPATIENT
Start: 2024-08-26 | End: 2024-08-26 | Stop reason: HOSPADM

## 2024-08-26 RX ORDER — PROPOFOL 10 MG/ML
INJECTION, EMULSION INTRAVENOUS PRN
Status: DISCONTINUED | OUTPATIENT
Start: 2024-08-26 | End: 2024-08-26 | Stop reason: SDUPTHER

## 2024-08-26 RX ORDER — LIDOCAINE HYDROCHLORIDE 20 MG/ML
INJECTION, SOLUTION EPIDURAL; INFILTRATION; INTRACAUDAL; PERINEURAL PRN
Status: DISCONTINUED | OUTPATIENT
Start: 2024-08-26 | End: 2024-08-26 | Stop reason: SDUPTHER

## 2024-08-26 RX ORDER — NALOXONE HYDROCHLORIDE 0.4 MG/ML
INJECTION, SOLUTION INTRAMUSCULAR; INTRAVENOUS; SUBCUTANEOUS PRN
Status: DISCONTINUED | OUTPATIENT
Start: 2024-08-26 | End: 2024-08-26 | Stop reason: HOSPADM

## 2024-08-26 RX ADMIN — MIDAZOLAM 2 MG: 1 INJECTION INTRAMUSCULAR; INTRAVENOUS at 14:52

## 2024-08-26 RX ADMIN — OXYCODONE HYDROCHLORIDE AND ACETAMINOPHEN 1 TABLET: 5; 325 TABLET ORAL at 17:24

## 2024-08-26 RX ADMIN — TRAZODONE HYDROCHLORIDE 100 MG: 50 TABLET ORAL at 20:15

## 2024-08-26 RX ADMIN — PANTOPRAZOLE SODIUM 40 MG: 40 INJECTION, POWDER, FOR SOLUTION INTRAVENOUS at 20:15

## 2024-08-26 RX ADMIN — SODIUM CHLORIDE, PRESERVATIVE FREE 10 ML: 5 INJECTION INTRAVENOUS at 20:46

## 2024-08-26 RX ADMIN — OXYCODONE HYDROCHLORIDE AND ACETAMINOPHEN 1 TABLET: 5; 325 TABLET ORAL at 03:38

## 2024-08-26 RX ADMIN — OXYCODONE HYDROCHLORIDE AND ACETAMINOPHEN 1 TABLET: 5; 325 TABLET ORAL at 08:02

## 2024-08-26 RX ADMIN — MICONAZOLE NITRATE: 2 POWDER TOPICAL at 08:10

## 2024-08-26 RX ADMIN — LEVOTHYROXINE SODIUM 75 MCG: 75 TABLET ORAL at 05:59

## 2024-08-26 RX ADMIN — MORPHINE SULFATE 4 MG: 4 INJECTION, SOLUTION INTRAMUSCULAR; INTRAVENOUS at 05:59

## 2024-08-26 RX ADMIN — OXYCODONE HYDROCHLORIDE AND ACETAMINOPHEN 1 TABLET: 5; 325 TABLET ORAL at 12:51

## 2024-08-26 RX ADMIN — POTASSIUM CHLORIDE: 2 INJECTION, SOLUTION, CONCENTRATE INTRAVENOUS at 18:06

## 2024-08-26 RX ADMIN — SODIUM CHLORIDE, PRESERVATIVE FREE 10 ML: 5 INJECTION INTRAVENOUS at 08:11

## 2024-08-26 RX ADMIN — LOPERAMIDE HYDROCHLORIDE 2 MG: 2 CAPSULE ORAL at 08:03

## 2024-08-26 RX ADMIN — LIDOCAINE HYDROCHLORIDE 50 MG: 20 INJECTION, SOLUTION EPIDURAL; INFILTRATION; INTRACAUDAL; PERINEURAL at 14:59

## 2024-08-26 RX ADMIN — SOYBEAN OIL 250 ML: 20 INJECTION, SOLUTION INTRAVENOUS at 18:05

## 2024-08-26 RX ADMIN — PANTOPRAZOLE SODIUM 40 MG: 40 INJECTION, POWDER, FOR SOLUTION INTRAVENOUS at 08:06

## 2024-08-26 RX ADMIN — GLYCOPYRROLATE 0.2 MG: 0.2 INJECTION INTRAMUSCULAR; INTRAVENOUS at 14:59

## 2024-08-26 RX ADMIN — SODIUM CHLORIDE: 9 INJECTION, SOLUTION INTRAVENOUS at 14:45

## 2024-08-26 RX ADMIN — FENTANYL CITRATE 50 MCG: 50 INJECTION, SOLUTION INTRAMUSCULAR; INTRAVENOUS at 15:04

## 2024-08-26 RX ADMIN — ONDANSETRON 4 MG: 4 TABLET, ORALLY DISINTEGRATING ORAL at 10:43

## 2024-08-26 RX ADMIN — SERTRALINE 50 MG: 50 TABLET, FILM COATED ORAL at 08:03

## 2024-08-26 RX ADMIN — OXYCODONE HYDROCHLORIDE AND ACETAMINOPHEN 1 TABLET: 5; 325 TABLET ORAL at 21:38

## 2024-08-26 RX ADMIN — CEFEPIME 2000 MG: 2 INJECTION, POWDER, FOR SOLUTION INTRAVENOUS at 06:04

## 2024-08-26 RX ADMIN — VANCOMYCIN HYDROCHLORIDE 1250 MG: 10 INJECTION, POWDER, LYOPHILIZED, FOR SOLUTION INTRAVENOUS at 08:21

## 2024-08-26 RX ADMIN — PROPOFOL 50 MG: 10 INJECTION, EMULSION INTRAVENOUS at 14:59

## 2024-08-26 RX ADMIN — FENTANYL CITRATE 50 MCG: 50 INJECTION, SOLUTION INTRAMUSCULAR; INTRAVENOUS at 14:57

## 2024-08-26 RX ADMIN — MICONAZOLE NITRATE: 2 POWDER TOPICAL at 20:46

## 2024-08-26 RX ADMIN — MORPHINE SULFATE 4 MG: 4 INJECTION, SOLUTION INTRAMUSCULAR; INTRAVENOUS at 20:15

## 2024-08-26 RX ADMIN — ONDANSETRON 4 MG: 2 INJECTION INTRAMUSCULAR; INTRAVENOUS at 15:04

## 2024-08-26 ASSESSMENT — PAIN DESCRIPTION - ORIENTATION
ORIENTATION: MID
ORIENTATION: LEFT;LOWER;MID
ORIENTATION: MID;LOWER
ORIENTATION: MID
ORIENTATION: LEFT;LOWER;MID

## 2024-08-26 ASSESSMENT — PAIN DESCRIPTION - DESCRIPTORS
DESCRIPTORS: ACHING;SHARP
DESCRIPTORS: SHARP;CRAMPING
DESCRIPTORS: ACHING
DESCRIPTORS: SHARP;CRAMPING
DESCRIPTORS: SHARP;CRAMPING
DESCRIPTORS: CRAMPING
DESCRIPTORS: ACHING
DESCRIPTORS: ACHING

## 2024-08-26 ASSESSMENT — PAIN SCALES - GENERAL
PAINLEVEL_OUTOF10: 7
PAINLEVEL_OUTOF10: 9
PAINLEVEL_OUTOF10: 8
PAINLEVEL_OUTOF10: 7
PAINLEVEL_OUTOF10: 4
PAINLEVEL_OUTOF10: 7

## 2024-08-26 ASSESSMENT — PAIN DESCRIPTION - LOCATION
LOCATION: ABDOMEN
LOCATION: ABDOMEN
LOCATION: SHOULDER;ABDOMEN
LOCATION: ABDOMEN
LOCATION: ABDOMEN;SHOULDER

## 2024-08-26 ASSESSMENT — LIFESTYLE VARIABLES: SMOKING_STATUS: 1

## 2024-08-26 NOTE — PROGRESS NOTES
GENERAL SURGERY  DAILY PROGRESS NOTE    Patient's Name/Date of Birth: Maryam Santana / 1965    Date: 2024     Chief Complaint   Patient presents with    Abdominal Pain     Fistula draining large amount yellow drainage.     Fever        Subjective:    No acute events overnight.  States she feels much better and is hoping to get out of bed.  Currently receiving TPN through right central line.    Blood cultures with no growth to date    Objective:  Last 24Hrs  Temp  Av.6 °F (36.4 °C)  Min: 97.1 °F (36.2 °C)  Max: 98.6 °F (37 °C)  Resp  Av.9  Min: 16  Max: 20  Pulse  Av  Min: 47  Max: 60  Systolic (24hrs), Av , Min:133 , Max:174     Diastolic (24hrs), Av, Min:73, Max:84    SpO2  Av.6 %  Min: 98 %  Max: 100 %    I/O last 3 completed shifts:  In: 1413.4 [IV Piggyback:115.8]  Out: 3175 [Urine:2400; Stool:775]      General: In no acute distress, alert and oriented x4  Cardiovascular: Warm throughout, no edema  Respiratory: no respiratory distress, equal chest rise  Abdomen: soft, nontender, nondistended right central line in place, dry clean intact        CBC  No results for input(s): \"WBC\", \"RBC\", \"HGB\", \"HCT\", \"MCV\", \"MCH\", \"MCHC\", \"RDW\", \"PLT\", \"MPV\" in the last 72 hours.    CMP  Recent Labs     24  0648 24  0455 24  0345     --   --    K 4.5  --   --      --   --    CO2 21*  --   --    BUN 29*  --   --    CREATININE 1.1*  --   --    GLUCOSE 141*  --   --    CALCIUM 9.4  --   --    BILITOT 0.3 0.3 0.3   ALKPHOS 88 86 102   AST 29 27 27   ALT 24 27 33*         Assessment/Plan:    Patient Active Problem List   Diagnosis    Ventral hernia with bowel obstruction    Crohn's disease (HCC)    History of colon cancer    Depression    Crohn's disease of small intestine with intestinal obstruction (HCC)    Septic shock (HCC)    Enterocutaneous fistula    Ascending cholangitis    Bacteremia    Hypertension    NEGRO (acute kidney injury) (HCC)    Type 2 diabetes  mellitus, without long-term current use of insulin (HCC)    Moderate protein-calorie malnutrition (HCC)    Hypothyroidism    Controlled type 2 diabetes mellitus without complication (HCC)    Sepsis (HCC)    Lactic acidosis       59 y.o. female with enterocutaneous fistula and extensive surgical history admitted with sepsis  s/p R CVC     -Continue with TPN via right femoral central line  -Continue to monitor fistula and ostomy output, strict I's and O's  -Patient recommended to follow-up at [facility] for [facility] bowel transplant discussion regarding fistula takedown and possible reversal  -Appreciate ID recommendations  -Will plan for Mediport placement today 8/26     Plan will be discussed with attending      Edilberto Tim DO  General Surgery Resident, PGY-2    Electronically signed on 8/26/2024 at 7:31 AM

## 2024-08-26 NOTE — FLOWSHEET NOTE
Inpatient Wound Care (follow up) 423    Admit Date: 8/19/2024 10:53 AM    Reason for consult:  fistula/colostomy    Patient laying down in bed, awake, alert and oriented. Patient is waiting to go to surgery today for placement of mediport. Friend at bedside.    Past Medical History:   Diagnosis Date    Cancer (HCC)     colon    Depression     Fissure, anal     Hernia     Hypertension      Findings:     08/26/24 1107   Colostomy LLQ   Placement Date/Time: (c) 12/28/23 (c) 2300   Present on Admission/Arrival: Yes  Description (optional): colostomy  Location: LLQ   Stomal Appliance 1 piece;Flat;Clean, dry & intact   Stoma  Assessment Swelling   Peristomal Assessment GUILHERME   Mucocutaneous Junction   (GUILHERME)   Stool Appearance Watery   Stool Color Brown;Green   Stool Amount Small   Output (mL)   (didn't empty bag)   Wound 12/28/23 Abdomen Lower;Medial fistula   Date First Assessed/Time First Assessed: 12/28/23 0912   Present on Original Admission: Yes  Primary Wound Type: Surgical Type  Location: Abdomen  Wound Location Orientation: Lower;Medial  Wound Description (Comments): fistula   Dressing Status Clean;Dry;Intact   Dressing/Treatment Fistula pouch       Plan:  Plan a time to teach spouse fistula pouch care. Will attempt again tomorrow  Change pouch, will follow  Patient will need continued preventative care    Herlinda Garcia RN 8/26/2024 11:56 AM

## 2024-08-26 NOTE — PROGRESS NOTES
Pharmacy Consultation Note  (Antibiotic Dosing and Monitoring)    Initial consult date: 8/21  Consulting physician/provider: Dmitri  Drug: Vancomycin  Indication: Bloodstream infection    Age/  Gender Height Weight IBW  Allergy Information   59 y.o./female 154.9 cm (5' 1\") 68 kg (150 lb)     Ideal body weight: 47.8 kg (105 lb 6.1 oz)  Adjusted ideal body weight: 52.3 kg (115 lb 4.9 oz)   Dilaudid [hydromorphone hcl], Cocoa, Lactose, Lyrica [pregabalin], Phenergan [promethazine hcl], and Phenytoin sodium extended      Renal Function:  Recent Labs     08/24/24  0648   BUN 29*   CREATININE 1.1*       Intake/Output Summary (Last 24 hours) at 8/26/2024 1121  Last data filed at 8/26/2024 0813  Gross per 24 hour   Intake 650 ml   Output 3200 ml   Net -2550 ml       Vancomycin Monitoring:  Trough:    Recent Labs     08/24/24  0412 08/24/24  0648   VANCOTROUGH 10.3 9.3     Random:  No results for input(s): \"VANCORANDOM\" in the last 72 hours.      Assessment:  Patient is a 59 y.o. female who has been initiated on vancomycin  Estimated Creatinine Clearance: 47 mL/min (A) (based on SCr of 1.1 mg/dL (H)).  No history of vancomycin levels in EPIC  Patient received vancomycin 1250 mg IV x 1 on 8/19 (1457)  Patient received no vancomycin on 8/20.    Plan:  Continue vancomycin 1250 mg IV q24h   (predicted AUC/JOSSE = 497 per [employer])   Will continue to monitor renal function   Pharmacy to follow    Jonah Mart RPH, PharmD, 8/26/2024 11:21 AM      SEB: [phone]  SEY: [phone]  SJW: [phone]

## 2024-08-26 NOTE — PLAN OF CARE
Problem: ABCDS Injury Assessment  Goal: Absence of physical injury  8/26/2024 0836 by Ashlyn Blankenship RN  Outcome: Progressing  8/26/2024 0019 by Emilie Candelaria RN  Outcome: Progressing     Problem: Skin/Tissue Integrity  Goal: Absence of new skin breakdown  Description: 1.  Monitor for areas of redness and/or skin breakdown  2.  Assess vascular access sites hourly  3.  Every 4-6 hours minimum:  Change oxygen saturation probe site  4.  Every 4-6 hours:  If on nasal continuous positive airway pressure, respiratory therapy assess nares and determine need for appliance change or resting period.  8/26/2024 0836 by Ashlyn Blankenship RN  Outcome: Progressing  8/26/2024 0019 by Emilie Candelaria RN  Outcome: Progressing     Problem: Discharge Planning  Goal: Discharge to home or other facility with appropriate resources  8/26/2024 0836 by Ashlyn Blankenship RN  Outcome: Progressing  8/26/2024 0019 by Emilie Candelaria RN  Outcome: Progressing     Problem: Safety - Adult  Goal: Free from fall injury  8/26/2024 0836 by Ashlyn Blankenship RN  Outcome: Progressing  8/26/2024 0019 by Emilie Candelaria RN  Outcome: Progressing     Problem: Chronic Conditions and Co-morbidities  Goal: Patient's chronic conditions and co-morbidity symptoms are monitored and maintained or improved  8/26/2024 0836 by Ashlyn Blankenship RN  Outcome: Progressing  8/26/2024 0019 by Emilie Candelaria RN  Outcome: Progressing     Problem: Pain  Goal: Verbalizes/displays adequate comfort level or baseline comfort level  8/26/2024 0836 by Ashlyn Blankenship RN  Outcome: Progressing  8/26/2024 0019 by Emilie Candelaria RN  Outcome: Progressing     Problem: Nutrition Deficit:  Goal: Optimize nutritional status  8/26/2024 0836 by Ashlyn Blankenship RN  Outcome: Progressing  8/26/2024 0019 by Emilie Candelaria RN  Outcome: Progressing

## 2024-08-26 NOTE — PROGRESS NOTES
[facility]  [facility]  Progress Note    SUBJECTIVE:  Chief Complaint   Patient presents with    Abdominal Pain     Fistula draining large amount yellow drainage.     Fever     No new complaints.  Her pain.  No nausea, vomiting.  Tolerating antibiotics.    Review of systems:  As stated above in the chief complaint, otherwise negative.    Medications:  Scheduled Meds:   cefepime  2,000 mg IntraVENous Q12H    insulin lispro  0-4 Units SubCUTAneous Q6H    sodium chloride flush  5-40 mL IntraVENous 2 times per day    lidocaine 1 % injection  50 mg IntraDERmal Once    vancomycin  1,250 mg IntraVENous Q24H    fluconazole  400 mg IntraVENous Q24H    fat emulsion  250 mL IntraVENous Q MWF    miconazole   Topical BID    aluminum sulfate-calcium acetate  1 packet Topical 4x Daily    menthol-zinc oxide   Topical 4x Daily    sodium chloride flush  5-40 mL IntraVENous 2 times per day    enoxaparin  40 mg SubCUTAneous Daily    levothyroxine  75 mcg Oral Daily    loperamide  2 mg Oral 4x Daily    sertraline  50 mg Oral Daily    traZODone  100 mg Oral Nightly    pantoprazole  40 mg IntraVENous BID     Continuous Infusions:   PN-Adult 2-in-1 Central Line (Standard) 65 mL/hr at 08/25/24 1730    sodium chloride      dextrose      sodium chloride       PRN Meds:sodium chloride flush, sodium chloride, oxyCODONE-acetaminophen, glucose, dextrose bolus **OR** dextrose bolus, glucagon (rDNA), dextrose, ipratropium 0.5 mg-albuterol 2.5 mg, white petrolatum, sodium chloride flush, sodium chloride, potassium chloride **OR** potassium alternative oral replacement **OR** potassium chloride, magnesium sulfate, ondansetron **OR** ondansetron, polyethylene glycol, acetaminophen **OR** acetaminophen, morphine **OR** morphine, diphenhydrAMINE    OBJECTIVE:  BP (!) 149/83   Pulse (!) 48   Temp 97.5 °F (36.4 °C) (Tympanic)   Resp 12   Ht 1.549 m (5' 1\")   Wt 59.1 kg (130 lb 3.2 oz)   SpO2 97%   BMI 24.60 kg/m²    Temp  Av.6 °F (36.4 °C)  Min: 97.1 °F (36.2 °C)  Max: 98.6 °F (37 °C)  Constitutional: The patient is lying in bed.  Awake and alert.  No distress.  Visitor in room.  Skin: Warm and dry. No rashes were noted.   HEENT: Round and reactive pupils.  Moist mucous membranes.  No ulcerations or thrush.  Neck: Supple to movements.   Chest: Good breath sounds.  No crackles. Mediport site with dressing.  Cardiovascular: Heart sounds rhythmic and regular.  Abdomen: Positive bowel sounds to auscultation.  Bilateral lower ostomies.  Cellulitis has resolved.  Ostomy/fistula little bit of liquidy stools in the bags.  Extremities: No edema.  Lines: Peripheral.  Femoral TLC.  Dressing is dry    Laboratory and Tests:  Lab Results   Component Value Date    CRP 83.0 (H) 2024    CRP 3.0 2024    CRP 4.0 2024     Lab Results   Component Value Date    SEDRATE 57 (H) 2024    SEDRATE 37 (H) 2024    SEDRATE 18 2023       Radiology:      Microbiology:   Blood cultures 2024: Staphylococcus epidermidis in 4 of 4 bottles   staph epi from the tip  Respiratory panel: Negative  Rapid SARS-CoV-2: Negative  Rapid influenza: Negative  Blood cultures 2024: Negative so far      Follow-up blood cultures from  negative so far      ASSESSMENT:  CLABSI secondary to infected Mediport.  Status post removal 2024.  Tip culture was positive with for coccus epidermidis  High-grade Staphylococcus epidermidis bacteremia  Crohn disease with 2 enterocutaneous fistulas  Cellulitis of the abdominal wall with possible spontaneously drained abdominal wall abscess-resolved  Fever associated to the above-resolved  Leukocytosis associated to the above-resolved    PLAN:  Okay for a new Mediport  Continue Vancomycin for 7 more days.  Reconciled  Stop Fluconazole and Cefepime  The patient can be discharged from ID standpoint    Spoke with nursing.    Cody Rizvi MD  11:31 AM  2024

## 2024-08-26 NOTE — PROGRESS NOTES
[facility] Hospitalist Progress Note    Admitting Date and Time: 8/19/2024 10:53 AM  Admit Dx: Hypokalemia [E87.6]  Lactic acidosis [E87.20]  NEGRO (acute kidney injury) (HCC) [N17.9]  Sepsis (HCC) [A41.9]  Sepsis, due to unspecified organism, unspecified whether acute organ dysfunction present (HCC) [A41.9]    Subjective:  Patient is being followed for Hypokalemia [E87.6]  Lactic acidosis [E87.20]  NEGRO (acute kidney injury) (HCC) [N17.9]  Sepsis (HCC) [A41.9]  Sepsis, due to unspecified organism, unspecified whether acute organ dysfunction present (HCC) [A41.9]   Feeling better without complaints  No CP or SOB  No fever or chills   No uncontrolled pain  No vomiting or diarrhea   No events reported overnight  Chart reviewed      cefepime  2,000 mg IntraVENous Q12H    insulin lispro  0-4 Units SubCUTAneous Q6H    sodium chloride flush  5-40 mL IntraVENous 2 times per day    lidocaine 1 % injection  50 mg IntraDERmal Once    vancomycin  1,250 mg IntraVENous Q24H    fluconazole  400 mg IntraVENous Q24H    fat emulsion  250 mL IntraVENous Q MWF    miconazole   Topical BID    aluminum sulfate-calcium acetate  1 packet Topical 4x Daily    menthol-zinc oxide   Topical 4x Daily    sodium chloride flush  5-40 mL IntraVENous 2 times per day    enoxaparin  40 mg SubCUTAneous Daily    levothyroxine  75 mcg Oral Daily    loperamide  2 mg Oral 4x Daily    sertraline  50 mg Oral Daily    traZODone  100 mg Oral Nightly    pantoprazole  40 mg IntraVENous BID     sodium chloride flush, 5-40 mL, PRN  sodium chloride, , PRN  oxyCODONE-acetaminophen, 1 tablet, Q4H PRN  glucose, 4 tablet, PRN  dextrose bolus, 125 mL, PRN   Or  dextrose bolus, 250 mL, PRN  glucagon (rDNA), 1 mg, PRN  dextrose, , Continuous PRN  ipratropium 0.5 mg-albuterol 2.5 mg, 1 Dose, Q4H PRN  white petrolatum, , BID PRN  sodium chloride flush, 5-40 mL, PRN  sodium chloride, , PRN  potassium chloride, 40 mEq, PRN   Or  potassium alternative oral replacement, 40  Currently with decreased stool output to ostomy, c/f leaking at fistula sites. Will need eventual outpatient [facility] evaluation for small bowel transplant and discussion of fistula takedown    HTN-initially HoTN on admission, normotensive now hold home meds, cont IVF w/ prn boluses, suspect d/t volume contraction/ high output/ GI losses/sepsis BP improving    Malabsorption due to Crohn's with enterocutaneous fistulas  TPN    Asthma- not in acute exac, cont nebs/inhalers    Anxiety/depression- cont meds    Hypothyroid- cont meds    DM2- not on home orals, SSI for now    Dispo: pending    NOTE: Portions of this report may have been transcribed using voice recognition software. Every effort was made to ensure accuracy; however, inadvertent computerized transcription errors may be present.  Electronically signed by Asaf Soto MD on 8/26/2024 at 10:08 AM

## 2024-08-26 NOTE — PROGRESS NOTES
Physical Therapy  PT eval attempted, but pt requested to hold due to just returning from procedure. Will re-attempt at later date.

## 2024-08-26 NOTE — PROGRESS NOTES
OCCUPATIONAL THERAPY INITIAL EVALUATION    [facility]   [address] [facility]        Date:2024                                                  Patient Name: Maryam Santana    MRN: [id number]    : 1965    Room: [address] [place] [address]    Evaluating OT: Luz Sultana OTR/L #[id number]    Referring Provider:  Edilberto Tim DO     Specific Provider Orders/Date:  OT Eval and Treat , 2024     Diagnosis:   1. Sepsis, due to unspecified organism, unspecified whether acute organ dysfunction present (HCC)    2. Hypokalemia    3. NEGRO (acute kidney injury) (HCC)    4. Lactic acidosis    5. Bacteremia         Surgery: S/p medi-port removal 24      Pertinent Medical History: Colon CA, colon surgery s/p ostomy, depression, HTN    Precautions:  Fall Risk, ostomy      Assessment of current deficits    [x] Functional mobility  [x]ADLs  [x] Strength               []Cognition    [x] Functional transfers   [x] IADLs         [x] Safety Awareness   [x]Endurance    [] Fine Coordination              [x] Balance      [] Vision/perception   []Sensation     []Gross Motor Coordination  [] ROM  [] Delirium                   [] Motor Control     OT PLAN OF CARE   OT POC based on physician orders, patient diagnosis and results of clinical assessment    Frequency/Duration 2-5 days/wk for 2-4 weeks PRN     Specific OT Treatment Interventions to include:   * Instruction/training on adapted ADL techniques and AE recommendations to increase functional independence within precautions       * Training on energy conservation strategies, correct breathing pattern and techniques to improve independence/tolerance for self-care routine  * Functional transfer/mobility training/DME recommendations for increased independence, safety, and fall prevention  * Patient/Family education to increase follow through with safety techniques and functional independence  * Recommendation of  environmental modifications for increased safety with functional transfers/mobility and ADLs  * Therapeutic exercise to improve motor endurance, ROM, and functional strength for ADLs/functional transfers  * Therapeutic activities to facilitate/challenge dynamic balance, stand tolerance for increased safety and independence with ADLs  * Therapeutic activities to facilitate gross/fine motor skills for increased independence with ADLs  * Positioning to improve skin integrity, interaction with environment and functional independence    Recommended Adaptive Equipment: TBD      Home Living: Lives with s/o, single family home, 1 story, 4 steps to enter with rail.   Bathroom set-up: Walk-in shower         Equipment owned: Wheeled walker, cane, w/c, shower chair     Prior Level of Function: Reports being mostly Modified Independent with ADLs. S/o assists with LB dressing. Ambulated independently without AD PTA.     Pain Level: 7/10 pain abdominal area; Nursing notified.     Cognition: A&O: 4/4; Follows 3 step directions   Memory: good    Sequencing: good    Problem solving: fair+   Judgement/safety: fair+     Functional Assessment: AM-PAC Daily Activity Raw Score: 16/24   Initial Eval Status  Date: 8/26/24   Treatment Status  Date:  STGs = LTGs  Time frame: 10-14 days   Feeding Supervision     Independent    Grooming Stand by Assist   Seated, set-up     Modified Scott    UB Dressing Minimal Assist  For mgmt of gown.     Modified Scott    LB Dressing Maximal Assist   Unable to don briefs d/t femoral line.   Dep to don socks at bed level.     Modified Scott    Bathing Moderate Assist     Modified Scott    Toileting Minimal Assist   For commode transfer.   SBA for perineal hygiene seated on commode.     Modified Scott    Bed Mobility  Supine to sit: Minimal Assist   Sit to supine:  N/A    Supine to sit: Independent   Sit to supine: Independent    Functional Transfers Sit to stand: Minimal  continued skilled OT to increase safety and independence with completion of ADL tasks and functional mobility for improved quality of life.       Treatment: OT treatment provided this date includes:   Instructions/training on safety, sequencing, and adapted techniques for completion of ADLs.  Facilitated bed mobility with cues for proper body mechanics and sequencing to prepare for ADL completion.  Instruction/training on safe functional mobility/transfer techniques including hand and feet placement   Facilitated proper positioning/alignment to improve interaction with environment, breathing, overall functioning     Rehab Potential: Good for established goals.      Patient / Family Goal: N/A      Patient and/or family were instructed on functional diagnosis, prognosis/goals and OT plan of care. Demonstrated fair plus understanding.     Eval Complexity: Low    Time In: 9:05 AM   Time Out: 9:28 AM   Total Treatment Time: 8       Min Units   OT Eval Low 97165  X  1    OT Eval Medium 44291      OT Eval High 75402      OT Re-Eval 49810            ADL/Self Care 67443 8 1   Therapeutic Activities 02133       Therapeutic Ex 81639       Orthotic Management 49614       Manual 54962     Neuro Re-Ed 63187       Non-Billable Time        Evaluation Time additionally includes thorough review of current medical information, gathering information on past medical history/social history and prior level of function, interpretation of standardized testing/informal observation of tasks, assessment of data and development of plan of care and goals.        Evaluating OT: Luz Sultana OTR/L #[id number]

## 2024-08-26 NOTE — OP NOTE
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 Item Serial No.  Lot No. LRB No. Used Action   PORT INFUS 8FR L45CM TI LO PROF PWR INJ W/ ATTCH POLYUR NIK - [id number]  PORT INFUS 8FR L45CM TI LO PROF PWR INJ W/ ATTCH POLYUR NIK  Chatterous-WD [id number] 1 Implanted         Drains:   Colostomy LLQ (Active)   Stomal Appliance 1 piece;Flat;Clean, dry & intact 08/26/24 1107   Stoma  Assessment Swelling 08/26/24 1107   Peristomal Assessment GUILHERME 08/26/24 1107   Mucocutaneous Junction Intact 08/23/24 1005   Treatment Barrier ring;Pouch change;Site care;Stoma powder;Liquid skin barrier;Other (Comment) 08/20/24 1544   Stool Appearance Watery 08/26/24 1107   Stool Color Brown;Green 08/26/24 1107   Stool Amount Small 08/26/24 1107   Output (mL) 25 ml 08/25/24 2116       Fistula Umbilicus (Active)   Stomal Appliance 1 piece;Flat;Clean, dry & intact 08/26/24 1107   Peristomal Assessment GUILHERME 08/26/24 1107   Treatment Barrier ring;Pouch change;Site care;Stoma powder;Liquid skin barrier 08/23/24 1105   Stool Color Green;Brown 08/26/24 1107   Stool Appearance Watery 08/26/24 1107   Stool Amount Small 08/26/24 1107   Output (mL) 300 ml 08/26/24 0800       Findings:  Infection Present At Time Of Surgery (PATOS) (choose all levels that have infection present):  No infection present  Other

## 2024-08-26 NOTE — ANESTHESIA PRE PROCEDURE
Department of Anesthesiology  Preprocedure Note       Name:  Maryam Santana   Age:  59 y.o.  :  1965                                          MRN:  [id number]         Date:  2024      Surgeon: Surgeon(s):  Erick Hunt DO    Procedure: Procedure(s):  MEDI PORT INSERTION    Medications prior to admission:   Prior to Admission medications    Medication Sig Start Date End Date Taking? Authorizing Provider   vancomycin (VANCOCIN) infusion Infuse 1,250 mg intravenously every 24 hours for 7 days Compound per protocol. 24 Yes Cody Rizvi MD   adalimumab (HUMIRA, 2 SYRINGE,) 40 MG/0.8ML injection Inject 40 mg every 2 weeks by subcutaneous route for 30 days.   Yes Katelyn Sandoval MD   sertraline (ZOLOFT) 50 MG tablet Take 1 tablet by mouth daily   Yes Katelyn Sandoval MD   traZODone (DESYREL) 100 MG tablet Take 1 tablet by mouth nightly   Yes Katelyn Sandoval MD   albuterol sulfate HFA (VENTOLIN HFA) 108 (90 Base) MCG/ACT inhaler Inhale 2 puffs into the lungs every 4 hours as needed for Wheezing or Shortness of Breath   Yes Katelyn Sandoval MD   levothyroxine (SYNTHROID) 75 MCG tablet Take 1 tablet by mouth Daily   Yes Katelyn Sandoval MD   loperamide (IMODIUM) 2 MG capsule Take 1 capsule by mouth 4 times daily Indications: MAX: 16MG/DAY   Yes Katelyn Sandoval MD   ondansetron (ZOFRAN) 4 MG tablet Take 1 tablet by mouth every 6 hours as needed for Nausea or Vomiting   Yes Katelyn Sandoval MD   silver sulfADIAZINE (SILVADENE) 1 % cream Apply topically See Admin Instructions QD & PRN SURROUNDING ABD WOUND   Yes Katelyn Sandoval MD   apixaban (ELIQUIS) 5 MG TABS tablet Take 1 tablet by mouth 2 times daily   Yes Katelyn Sandoval MD   omeprazole (PRILOSEC) 20 MG delayed release capsule Take 1 capsule by mouth 2 times daily  Patient not taking: Reported on 2024    Katelyn Sandoval MD       Current medications:    Current

## 2024-08-26 NOTE — ANESTHESIA POSTPROCEDURE EVALUATION
Department of Anesthesiology  Postprocedure Note    Patient: Maryam Santana  MRN: [id number]  YOB: 1965  Date of evaluation: 8/26/2024    Procedure Summary       Date: 08/26/24 Room / Location: [address]    Anesthesia Start: 1450 Anesthesia Stop: 1546    Procedure: MEDI PORT INSERTION (Chest) Diagnosis:       Sepsis, due to unspecified organism, unspecified whether acute organ dysfunction present (HCC)      (Sepsis, due to unspecified organism, unspecified whether acute organ dysfunction present (HCC) [A41.9])    Surgeons: Erick Hunt DO Responsible Provider: Denita Rai DO    Anesthesia Type: MAC ASA Status: 3            Anesthesia Type: No value filed.    Maggie Phase I:      Maggie Phase II: Maggie Score: 10    Anesthesia Post Evaluation    Patient location during evaluation: bedside  Patient participation: complete - patient participated  Level of consciousness: awake  Pain score: 1  Airway patency: patent  Nausea & Vomiting: no nausea and no vomiting  Cardiovascular status: blood pressure returned to baseline and hemodynamically stable  Respiratory status: acceptable  Hydration status: stable  Pain management: adequate and satisfactory to patient        No notable events documented.

## 2024-08-27 LAB
ALBUMIN SERPL-MCNC: 3.5 G/DL (ref 3.5–5.2)
ALBUMIN SERPL-MCNC: 3.5 G/DL (ref 3.5–5.2)
ALP SERPL-CCNC: 89 U/L (ref 35–104)
ALP SERPL-CCNC: 92 U/L (ref 35–104)
ALT SERPL-CCNC: 23 U/L (ref 0–32)
ALT SERPL-CCNC: 24 U/L (ref 0–32)
ANION GAP SERPL CALCULATED.3IONS-SCNC: 12 MMOL/L (ref 7–16)
AST SERPL-CCNC: 19 U/L (ref 0–31)
AST SERPL-CCNC: 22 U/L (ref 0–31)
BILIRUB DIRECT SERPL-MCNC: <0.2 MG/DL (ref 0–0.3)
BILIRUB INDIRECT SERPL-MCNC: NORMAL MG/DL (ref 0–1)
BILIRUB SERPL-MCNC: 0.2 MG/DL (ref 0–1.2)
BILIRUB SERPL-MCNC: 0.2 MG/DL (ref 0–1.2)
BUN SERPL-MCNC: 20 MG/DL (ref 6–20)
CALCIUM SERPL-MCNC: 9.4 MG/DL (ref 8.6–10.2)
CHLORIDE SERPL-SCNC: 102 MMOL/L (ref 98–107)
CO2 SERPL-SCNC: 23 MMOL/L (ref 22–29)
CREAT SERPL-MCNC: 0.9 MG/DL (ref 0.5–1)
GFR, ESTIMATED: 76 ML/MIN/1.73M2
GLUCOSE BLD-MCNC: 132 MG/DL (ref 74–99)
GLUCOSE BLD-MCNC: 161 MG/DL (ref 74–99)
GLUCOSE SERPL-MCNC: 140 MG/DL (ref 74–99)
MAGNESIUM SERPL-MCNC: 1.9 MG/DL (ref 1.6–2.6)
MICROORGANISM SPEC CULT: ABNORMAL
MICROORGANISM SPEC CULT: ABNORMAL
PHOSPHATE SERPL-MCNC: 2.4 MG/DL (ref 2.5–4.5)
POTASSIUM SERPL-SCNC: 4.5 MMOL/L (ref 3.5–5)
PROT SERPL-MCNC: 6.6 G/DL (ref 6.4–8.3)
PROT SERPL-MCNC: 6.6 G/DL (ref 6.4–8.3)
SERVICE CMNT-IMP: ABNORMAL
SODIUM SERPL-SCNC: 137 MMOL/L (ref 132–146)
SPECIMEN DESCRIPTION: ABNORMAL

## 2024-08-27 PROCEDURE — 6370000000 HC RX 637 (ALT 250 FOR IP): Performed by: STUDENT IN AN ORGANIZED HEALTH CARE EDUCATION/TRAINING PROGRAM

## 2024-08-27 PROCEDURE — 6360000002 HC RX W HCPCS

## 2024-08-27 PROCEDURE — 83735 ASSAY OF MAGNESIUM: CPT

## 2024-08-27 PROCEDURE — 97161 PT EVAL LOW COMPLEX 20 MIN: CPT

## 2024-08-27 PROCEDURE — 80053 COMPREHEN METABOLIC PANEL: CPT

## 2024-08-27 PROCEDURE — 6360000002 HC RX W HCPCS: Performed by: STUDENT IN AN ORGANIZED HEALTH CARE EDUCATION/TRAINING PROGRAM

## 2024-08-27 PROCEDURE — 6370000000 HC RX 637 (ALT 250 FOR IP): Performed by: INTERNAL MEDICINE

## 2024-08-27 PROCEDURE — 2580000003 HC RX 258

## 2024-08-27 PROCEDURE — 1200000000 HC SEMI PRIVATE

## 2024-08-27 PROCEDURE — 2580000003 HC RX 258: Performed by: SPECIALIST

## 2024-08-27 PROCEDURE — 82962 GLUCOSE BLOOD TEST: CPT

## 2024-08-27 PROCEDURE — 80076 HEPATIC FUNCTION PANEL: CPT

## 2024-08-27 PROCEDURE — 6360000002 HC RX W HCPCS: Performed by: INTERNAL MEDICINE

## 2024-08-27 PROCEDURE — 6360000002 HC RX W HCPCS: Performed by: SPECIALIST

## 2024-08-27 PROCEDURE — 84100 ASSAY OF PHOSPHORUS: CPT

## 2024-08-27 PROCEDURE — 99232 SBSQ HOSP IP/OBS MODERATE 35: CPT | Performed by: INTERNAL MEDICINE

## 2024-08-27 PROCEDURE — 2500000003 HC RX 250 WO HCPCS

## 2024-08-27 RX ORDER — CLOTRIMAZOLE 10 MG/1
10 LOZENGE ORAL
Status: DISCONTINUED | OUTPATIENT
Start: 2024-08-27 | End: 2024-08-28 | Stop reason: HOSPADM

## 2024-08-27 RX ORDER — NYSTATIN 100000/ML
5 SUSPENSION, ORAL (FINAL DOSE FORM) ORAL 4 TIMES DAILY
Status: DISCONTINUED | OUTPATIENT
Start: 2024-08-27 | End: 2024-08-28 | Stop reason: HOSPADM

## 2024-08-27 RX ADMIN — OXYCODONE HYDROCHLORIDE AND ACETAMINOPHEN 1 TABLET: 5; 325 TABLET ORAL at 13:34

## 2024-08-27 RX ADMIN — PANTOPRAZOLE SODIUM 40 MG: 40 INJECTION, POWDER, FOR SOLUTION INTRAVENOUS at 20:00

## 2024-08-27 RX ADMIN — MORPHINE SULFATE 2 MG: 2 INJECTION, SOLUTION INTRAMUSCULAR; INTRAVENOUS at 20:00

## 2024-08-27 RX ADMIN — POTASSIUM CHLORIDE: 2 INJECTION, SOLUTION, CONCENTRATE INTRAVENOUS at 18:03

## 2024-08-27 RX ADMIN — MORPHINE SULFATE 2 MG: 2 INJECTION, SOLUTION INTRAMUSCULAR; INTRAVENOUS at 12:06

## 2024-08-27 RX ADMIN — LOPERAMIDE HYDROCHLORIDE 2 MG: 2 CAPSULE ORAL at 16:56

## 2024-08-27 RX ADMIN — MORPHINE SULFATE 4 MG: 4 INJECTION, SOLUTION INTRAMUSCULAR; INTRAVENOUS at 15:59

## 2024-08-27 RX ADMIN — PANTOPRAZOLE SODIUM 40 MG: 40 INJECTION, POWDER, FOR SOLUTION INTRAVENOUS at 09:44

## 2024-08-27 RX ADMIN — OXYCODONE HYDROCHLORIDE AND ACETAMINOPHEN 1 TABLET: 5; 325 TABLET ORAL at 17:49

## 2024-08-27 RX ADMIN — SODIUM PHOSPHATE, MONOBASIC, MONOHYDRATE AND SODIUM PHOSPHATE, DIBASIC, ANHYDROUS 30 MMOL: 142; 276 INJECTION, SOLUTION INTRAVENOUS at 12:01

## 2024-08-27 RX ADMIN — ENOXAPARIN SODIUM 40 MG: 100 INJECTION SUBCUTANEOUS at 09:44

## 2024-08-27 RX ADMIN — SERTRALINE 50 MG: 50 TABLET, FILM COATED ORAL at 09:43

## 2024-08-27 RX ADMIN — VANCOMYCIN HYDROCHLORIDE 1250 MG: 10 INJECTION, POWDER, LYOPHILIZED, FOR SOLUTION INTRAVENOUS at 09:49

## 2024-08-27 RX ADMIN — OXYCODONE HYDROCHLORIDE AND ACETAMINOPHEN 1 TABLET: 5; 325 TABLET ORAL at 04:57

## 2024-08-27 RX ADMIN — OXYCODONE HYDROCHLORIDE AND ACETAMINOPHEN 1 TABLET: 5; 325 TABLET ORAL at 22:21

## 2024-08-27 RX ADMIN — TRAZODONE HYDROCHLORIDE 100 MG: 50 TABLET ORAL at 20:00

## 2024-08-27 RX ADMIN — OXYCODONE HYDROCHLORIDE AND ACETAMINOPHEN 1 TABLET: 5; 325 TABLET ORAL at 09:42

## 2024-08-27 RX ADMIN — LOPERAMIDE HYDROCHLORIDE 2 MG: 2 CAPSULE ORAL at 09:42

## 2024-08-27 RX ADMIN — MORPHINE SULFATE 4 MG: 4 INJECTION, SOLUTION INTRAMUSCULAR; INTRAVENOUS at 00:05

## 2024-08-27 RX ADMIN — SODIUM CHLORIDE, PRESERVATIVE FREE 10 ML: 5 INJECTION INTRAVENOUS at 09:44

## 2024-08-27 RX ADMIN — LOPERAMIDE HYDROCHLORIDE 2 MG: 2 CAPSULE ORAL at 11:56

## 2024-08-27 RX ADMIN — SODIUM CHLORIDE, PRESERVATIVE FREE 10 ML: 5 INJECTION INTRAVENOUS at 20:54

## 2024-08-27 RX ADMIN — LEVOTHYROXINE SODIUM 75 MCG: 75 TABLET ORAL at 05:05

## 2024-08-27 ASSESSMENT — PAIN DESCRIPTION - LOCATION
LOCATION: ABDOMEN

## 2024-08-27 ASSESSMENT — PAIN DESCRIPTION - ORIENTATION
ORIENTATION: MID;LOWER

## 2024-08-27 ASSESSMENT — PAIN SCALES - GENERAL
PAINLEVEL_OUTOF10: 7
PAINLEVEL_OUTOF10: 9
PAINLEVEL_OUTOF10: 7

## 2024-08-27 ASSESSMENT — PAIN DESCRIPTION - DESCRIPTORS
DESCRIPTORS: SHARP;STABBING
DESCRIPTORS: ACHING
DESCRIPTORS: SHARP;ACHING
DESCRIPTORS: ACHING
DESCRIPTORS: BURNING
DESCRIPTORS: ACHING;SHARP
DESCRIPTORS: SHARP

## 2024-08-27 NOTE — PROGRESS NOTES
Physical Therapy  Facility/Department: [address] INTERNAL MEDICINE 2  Physical Therapy Initial Assessment    Name: Maryam Santana  : 1965  MRN: [id number]  Date of Service: 2024        Patient Diagnosis(es): The primary encounter diagnosis was Sepsis, due to unspecified organism, unspecified whether acute organ dysfunction present (HCC). Diagnoses of Hypokalemia, NERGO (acute kidney injury) (HCC), Lactic acidosis, and Bacteremia were also pertinent to this visit.  Past Medical History:  has a past medical history of Cancer (HCC), Depression, Fissure, anal, Hernia, and Hypertension.  Past Surgical History:  has a past surgical history that includes hernia repair; Colon surgery; Appendectomy; Ovary removal; Tunneled venous port placement; Cholecystectomy;  section; Insert Picc Line (2024); picc line insertion nurse (3/11/2024); Port Surgery (Right, 2024); and Port Surgery (N/A, 2024).      Evaluating Therapist: Radha Mota PT    Room #:  0423/0423-A  Diagnosis:  Hypokalemia [E87.6]  Lactic acidosis [E87.20]  NEGRO (acute kidney injury) (HCC) [N17.9]  Sepsis (HCC) [A41.9]  Sepsis, due to unspecified organism, unspecified whether acute organ dysfunction present (HCC) [A41.9]  PMHx/PSHx:  CA, HTN, ostomy  Procedure/Surgery:  Mediport   Precautions:  falls      Social:  Pt lives with S.O.  in a 1 floor plan 4 steps to enter.  Prior to admission independent without device Has ww.      Initial Evaluation  Date: 24 Treatment      Short Term/ Long Term   Goals   Was pt agreeable to Eval/treatment? yes     Does pt have pain? Abdominal pain     Bed Mobility  Rolling: independent  Supine to sit: independent  Sit to supine: independent  Scooting: independent  independent   Transfers Sit to stand: SBA  Stand to sit: SBA  Stand pivot: SBA  independent   Ambulation    100 feet with ww with SBA  200 feet with ww as needed independent   Stair Negotiation  Ascended and descended  NT   4 steps with

## 2024-08-27 NOTE — PROGRESS NOTES
Comprehensive Nutrition Assessment    Type and Reason for Visit:  Reassess    Nutrition Recommendations/Plan:   Continue current PN regimen, while PO not medically recommended. Current PN meets 100% energy and protein needs  Continue inpatient monitoring     Malnutrition Assessment:  Malnutrition Status:  At risk for malnutrition (Comment) (08/20/24 1135)    Context:  Chronic Illness     Findings of the 6 clinical characteristics of malnutrition:  Energy Intake:  Mild decrease in energy intake (Comment) (NPO until PN ordered)  Weight Loss:  No significant weight loss     Body Fat Loss:  No significant body fat loss     Muscle Mass Loss:  Mild muscle mass loss Clavicles (pectoralis & deltoids)  Fluid Accumulation:  No significant fluid accumulation     Strength:  Not Performed    Nutrition Assessment:    Pt s/p 8/26 new Mediport and TPN continues. Recommend continue same PN with lipids x 3/wk.    Nutrition Related Findings:    A&Ox4, abd cramping, +BS, colostomy +stool, abd EC fistula pouched, I/O WNL, no edema Wound Type: Multiple (denuded skin around leaking ECF, colostomy - per wound care)       Current Nutrition Intake & Therapies:    Average Meal Intake: NPO  Average Supplements Intake: NPO  Diet NPO Exceptions are: Sips of Water with Meds  PN-Adult 2-in-1 Central Line (Standard)  PN-Adult 2-in-1 Central Line (Standard)  Current Parenteral Nutrition Orders:  Type and Formula: 2-in-1 Standard   Lipids: 250ml, Three times weekly  Duration: Continuous  Rate/Volume: 65 ml/hr = 1560 ml/d  Current PN Order Provides: at goal  Goal PN Orders Provides: 1322 kana, 78 g AA    Anthropometric Measures:  Height: 154.9 cm (5' 1\")  Ideal Body Weight (IBW): 105 lbs (48 kg)    Admission Body Weight: 65 kg (143 lb 4.8 oz) (8/20)  Current Body Weight: 59.1 kg (130 lb 4.7 oz), 136.5 % IBW. Weight Source: Bed Scale (8/26)  Current BMI (kg/m2): 24.6  Usual Body Weight: 63.3 kg (139 lb 9 oz) (3/12/24 bedscale)  % Weight Change  (Calculated): 2.7                    BMI Categories: Normal Weight (BMI 18.5-24.9)    Estimated Daily Nutrient Needs:  Energy Requirements Based On: Formula ([place] Jeor)  Weight Used for Energy Requirements: Current  Energy (kcal/day):   Weight Used for Protein Requirements: Ideal  Protein (g/day): 65-75 (1.3-1.5 g/kg)  Method Used for Fluid Requirements: 1 ml/kcal  Fluid (ml/day):     Nutrition Diagnosis:   Inadequate oral intake related to altered GI structure as evidenced by NPO or clear liquid status due to medical condition, nutrition support - parenteral nutrition    Nutrition Interventions:   Food and/or Nutrient Delivery: Continue NPO, Continue Current Parenteral Nutrition  Nutrition Education/Counseling: Education not indicated  Coordination of Nutrition Care: Continue to monitor while inpatient       Goals:  Previous Goal Met: Progressing toward Goal(s)  Goals: Tolerate nutrition support at goal rate, by next RD assessment       Nutrition Monitoring and Evaluation:   Behavioral-Environmental Outcomes: None Identified  Food/Nutrient Intake Outcomes: Parenteral Nutrition Intake/Tolerance  Physical Signs/Symptoms Outcomes: Biochemical Data, GI Status, Fluid Status or Edema, Nutrition Focused Physical Findings, Skin, Weight    Discharge Planning:    Parenteral Nutrition     Abby Dorsey RD, CNSC, LD  Contact: x [phone]

## 2024-08-27 NOTE — CARE COORDINATION
Notified by Leyla with [employer] that patient has had a change in payor source form original registration.  Noted is a transition from [facility] Dual to traditional Medicare.  This modification will have an impact on patient's financial responsibility regarding copay's for TPN as provided by [facility] [facility] and may impact agency's capacity to deliver care.  Leyla has attempted to discuss this with patient but has missed the opportunity to do so.  She has communicated issue to patient's significant other Chu.    I met with patient (who had heard from Chu) and re issue. who understandably was frustrated however was very clear in stating that she would not consider a SNF or LTAC.  In collaboration with , was able to establish an accepting [facility] agency ([employer]) who is available to start care upon discharge.  Leyla with [facility] has communicated that agency has been able to conduct the necessary chart review and establish that patient will meet Medicare criteria for receipt of home TPN.  [facility] will also work with patient to enroll in program for financial assistance regarding the 20% copay for home TPN as defined by Medicare guidelines.  [facility] will also be providing the IV vancomycin as prescribed by infectious disease (script has been faxed).  A copy of patient's [facility] Prescription Drug Plan card has also been sent via fax to [facility].  [facility] orders have been received and entered from Dr. Soto.  Call placed to Dr. Urban who is provided an update and confirms agreement to follow patient for care and sign orders post discharge.   Patient is updated at bedside and is agreeable to plan.  Anticipate dc in next 24 hours.  Tal Flores, KENYA RN  [facility] Case Management  [phone]

## 2024-08-27 NOTE — FLOWSHEET NOTE
Inpatient Wound Care (follow up) 423    Admit Date: 8/19/2024 10:53 AM    Reason for consult:  fistula and colostomy pouch change    Patient laying down in bed, awake, alert and oriented. Spouse and friend at bedside    Past Medical History:   Diagnosis Date    Cancer (HCC)     colon    Depression     Fissure, anal     Hernia     Hypertension      Findings:     08/27/24 1540   Colostomy LLQ   Placement Date/Time: (c) 12/28/23 (c) 2300   Present on Admission/Arrival: Yes  Description (optional): colostomy  Location: LLQ   Stomal Appliance 1 piece;Flat;Changed  (pediatric pouch)   Stoma  Assessment Red;Moist;Protrudes   Peristomal Assessment Clean, dry & intact   Mucocutaneous Junction Intact   Treatment Barrier ring;Site care;Pouch change  (1pc flat pediatric pouch, elastic barrier strips)   Stool Appearance Watery   Stool Color Green;Black   Stool Amount Small   Output (mL) 50 ml   Wound 12/28/23 Abdomen Lower;Medial fistula   Date First Assessed/Time First Assessed: 12/28/23 0912   Present on Original Admission: Yes  Primary Wound Type: Surgical Type  Location: Abdomen  Wound Location Orientation: Lower;Medial  Wound Description (Comments): fistula   Wound Etiology Other  (EC FISTULA)   Dressing Status New dressing applied   Wound Cleansed Cleansed with saline   Dressing/Treatment Fistula pouch   Wound Length (cm) 1 cm   Wound Width (cm) 1 cm   Wound Depth (cm)   (unable to determine)   Wound Surface Area (cm^2) 1 cm^2   Change in Wound Size % (l*w) 0   Wound Assessment Pink/red   Drainage Amount Other (Comment)  (small green black watery GI content)   Drainage Description   (small green black watery GI content)   Odor Fecal   Nadia-wound Assessment Erosion     Patient, spouse and friend acceptance for fistula/colostomy care lesson and pouch change. Demonstrated removal and application of fistula and colostomy pouch. Answered all questions. Step by step pictures instructions provided. Supplies left at bedside.  Ostomy nurse and company supply numbers in provided. Home Health to follow at home.     Plan:  Change pouch, continue fistula and colostomy care lessons, will follow  Patient will need continued preventative care    Herlinda Garcia RN 8/27/2024 5:43 PM

## 2024-08-27 NOTE — PROGRESS NOTES
GENERAL SURGERY  DAILY PROGRESS NOTE    Patient's Name/Date of Birth: Maryam Santana / 1965    Date: 2024     Chief Complaint   Patient presents with    Abdominal Pain     Fistula draining large amount yellow drainage.     Fever        Subjective:    Underwent placement of Mediport yesterday.  Doing well today.    Objective:  Last 24Hrs  Temp  Av.7 °F (36.5 °C)  Min: 97.1 °F (36.2 °C)  Max: 98.3 °F (36.8 °C)  Resp  Av.4  Min: 12  Max: 20  Pulse  Av  Min: 48  Max: 78  Systolic (24hrs), Av , Min:108 , Max:156     Diastolic (24hrs), Av, Min:66, Max:97    SpO2  Av.4 %  Min: 92 %  Max: 100 %    I/O last 3 completed shifts:  In: 850 [I.V.:200]  Out: 3535 [Urine:2625; Stool:900; Blood:10]      General: In no acute distress, alert and oriented x4, left Mediport incision dry clean intact with Dermabond glue, no erythema or redness  Cardiovascular: Warm throughout, no edema  Respiratory: no respiratory distress, equal chest rise  Abdomen: soft, nontender, nondistended right central line in place, dry clean intact        CBC  No results for input(s): \"WBC\", \"RBC\", \"HGB\", \"HCT\", \"MCV\", \"MCH\", \"MCHC\", \"RDW\", \"PLT\", \"MPV\" in the last 72 hours.    CMP  Recent Labs     24  0455 24  0345 24  0500   BILITOT 0.3 0.3 0.2   ALKPHOS 86 102 92   AST 27 27 19   ALT 27 33* 24         Assessment/Plan:    Patient Active Problem List   Diagnosis    Ventral hernia with bowel obstruction    Crohn's disease (HCC)    History of colon cancer    Depression    Crohn's disease of small intestine with intestinal obstruction (HCC)    Septic shock (HCC)    Enterocutaneous fistula    Ascending cholangitis    Bacteremia    Hypertension    NEGRO (acute kidney injury) (HCC)    Type 2 diabetes mellitus, without long-term current use of insulin (HCC)    Moderate protein-calorie malnutrition (HCC)    Hypothyroidism    Controlled type 2 diabetes mellitus without complication (HCC)    Sepsis (HCC)

## 2024-08-27 NOTE — DISCHARGE INSTRUCTIONS
Keep mediport site clean and dry   Take Ibuprofen or tylenol for pain  May shower in 24 hours may bathe in 5 days   Mild swelling or bruising is normal- You may place ice for comfort  Call for bleeding or dramatic swelling or signs of infection erythema, warmth, purulent drainage  No heavy lifting for 2 weeks  May use your port   Diet as tolerated   Follow up as needed      Fistula and colostomy supplies:  Coloplast 1pc flat pouch for fistula: 54065  Coloplast pediatric pouch for colostomy (star): 69058  Coloplast Brava Stoma powder 51063  Coloplast Brava Protective seal (barrier seal) 58359  Coloplast Brava Elastic Barrier strips (half moon) 543867  Convatec adhesive remover spray [id number]  Convatec Stomahesive paste [id number]    Skin prep:  Medline sureprep no sting ski protective barrier wipe [id number]  3M Cavilon no sting barrier film 3342  3M Cavilon Advance (wand) 5050    Domebero Astringent

## 2024-08-27 NOTE — CARE COORDINATION
Social work:    Assisted with discharge planning. Initial concern was voiced by Leyla at [facility] who advised c.m. that patient has no accepting [facility] company where she resides and [facility] was assisting all needs prior, however, patient is no longer on Medicaid and will have a co-pay for her TPN needs, as well as care needs they were now unable to provide. Leyla initally stated that she may require several days to check into financial assistance, coverage options.  Social work placed a call to [facility] and infusion/ pharmacist Robina and learned that they can ship patient's TPN/vanco to [place] so patient can receive it at home tomorrow, as patient is stable for discharge tomorrow. Social work also obtained HHC with [facility].  Social work updated Maryam and her  who were in agreement with [facility], if [employer] could not supply the TPN/vanco for discharge tomorrow and signed the financial responsibility form which was faxed to [facility] today.  Social work advised Andra at [facility] that Maryam will need financial assistance forms and will qualify for HFA.  In the interim Leyla at [facility] advised social service that they now can provide financial hardship as well and are also able to deliver the TPN/vanco for discharge tomorrow.  Leyla explained that she was misinformed on her end about patient's coverage, etc.  Social work tentatively canceled [facility] and advised them of the complications [facility] had initially encountered.  Patricia at [facility] is aware of expected discharge tomorrow.    Electronically signed by MENG Graham on 8/27/2024 at 4:55 PM

## 2024-08-27 NOTE — PROGRESS NOTES
[facility] Hospitalist Progress Note    Admitting Date and Time: 8/19/2024 10:53 AM  Admit Dx: Hypokalemia [E87.6]  Lactic acidosis [E87.20]  NEGRO (acute kidney injury) (HCC) [N17.9]  Sepsis (HCC) [A41.9]  Sepsis, due to unspecified organism, unspecified whether acute organ dysfunction present (HCC) [A41.9]    Subjective:  Patient is being followed for Hypokalemia [E87.6]  Lactic acidosis [E87.20]  NEGRO (acute kidney injury) (HCC) [N17.9]  Sepsis (HCC) [A41.9]  Sepsis, due to unspecified organism, unspecified whether acute organ dysfunction present (HCC) [A41.9]   Feeling better without complaints  No CP or SOB  No fever or chills   No uncontrolled pain  No vomiting or diarrhea   No events reported overnight  Chart reviewed      sodium phosphate IVPB (PERIPHERAL line)  30 mmol IntraVENous Once    insulin lispro  0-4 Units SubCUTAneous Q6H    sodium chloride flush  5-40 mL IntraVENous 2 times per day    lidocaine 1 % injection  50 mg IntraDERmal Once    vancomycin  1,250 mg IntraVENous Q24H    fat emulsion  250 mL IntraVENous Q MWF    miconazole   Topical BID    aluminum sulfate-calcium acetate  1 packet Topical 4x Daily    menthol-zinc oxide   Topical 4x Daily    sodium chloride flush  5-40 mL IntraVENous 2 times per day    enoxaparin  40 mg SubCUTAneous Daily    levothyroxine  75 mcg Oral Daily    loperamide  2 mg Oral 4x Daily    sertraline  50 mg Oral Daily    traZODone  100 mg Oral Nightly    pantoprazole  40 mg IntraVENous BID     sodium chloride flush, 5-40 mL, PRN  sodium chloride, , PRN  oxyCODONE-acetaminophen, 1 tablet, Q4H PRN  glucose, 4 tablet, PRN  dextrose bolus, 125 mL, PRN   Or  dextrose bolus, 250 mL, PRN  glucagon (rDNA), 1 mg, PRN  dextrose, , Continuous PRN  ipratropium 0.5 mg-albuterol 2.5 mg, 1 Dose, Q4H PRN  white petrolatum, , BID PRN  sodium chloride flush, 5-40 mL, PRN  sodium chloride, , PRN  potassium chloride, 40 mEq, PRN   Or  potassium alternative oral replacement, 40 mEq,  level of the top of the   aortic arch and it may reside within the right brachiocephalic vein.         CT ABDOMEN PELVIS W IV CONTRAST Additional Contrast? None   Final Result   1.  Presence of 2 fistula tract relate with group of small bowel segments   which had anastomotic areas, located proximal to the ileostomy loop.  The   fistula opening inferior to the site of the ileostomy 1 in the midline in the   umbilical region and the other in the left paraumbilical area.      2.  In further evaluation of the fistula tract architecture and relationship   with bowel segments can be achieved with fistulogram followed by CT   evaluation, 1 examination for each fetus low opening.      3.  Alternative will be correlation with CT scan abdomen pelvis with oral   contrast new allowing enough time for the oral contrast passed throughout the   entire small bowel.              Assessment:  Principal Problem:    Sepsis (HCC)  Active Problems:    Bacteremia    Crohn's disease (HCC)    Enterocutaneous fistula    Type 2 diabetes mellitus, without long-term current use of insulin (HCC)    Lactic acidosis  Resolved Problems:    * No resolved hospital problems. *      Plan:  Severe sepsis, abd wall cellulitis- fever, tachycardia, infection present on arrival, with HoTN. S/p vancpo+zosyn, now cefepime+fluconazole, ID consult appreciated and adjust as indicated, RVP negative, Bcx+ GPC, may need mediport removal. Does also report having an abscess to superior fistula site which ruptured 1w prior to admit--cefepime, vancomycin, Diflucan    Gram-positive bacteremia for 4 blood cultures positive for GPC Staph epidermidis per PCR likely infected Mediport.  Status post Mediport removal 8/22 temporary TLC placed.  Plan for Mediport placement Monday   ID Consult appreciated dscd case continue vancomycin    Crohn's w/ enterocutaneous fistulas- copious yellow drainage/stool, excoriations to surrounding skin of umbilicus, wound care following, cont  barrier cream. Gen Surg c/s, appreciate input. Possible Crohn's flare?, off steroids. NPO with TPN.  General surgery consult appreciated plan for central line today for continued TPN    Colostomy- since bowel perforation during hysterectomy. Currently with decreased stool output to ostomy, c/f leaking at fistula sites. 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 Every effort was made to ensure accuracy; however, inadvertent computerized transcription errors may be present.  Electronically signed by Asaf Soto MD on 8/27/2024 at 11:09 AM

## 2024-08-27 NOTE — PROGRESS NOTES
[facility]  [facility]  Progress Note    SUBJECTIVE:  Chief Complaint   Patient presents with    Abdominal Pain     Fistula draining large amount yellow drainage.     Fever     Mediport was inserted.  Tolerating antibiotic.    Review of systems:  As stated above in the chief complaint, otherwise negative.    Medications:  Scheduled Meds:   sodium phosphate IVPB (PERIPHERAL line)  30 mmol IntraVENous Once    insulin lispro  0-4 Units SubCUTAneous Q6H    sodium chloride flush  5-40 mL IntraVENous 2 times per day    lidocaine 1 % injection  50 mg IntraDERmal Once    vancomycin  1,250 mg IntraVENous Q24H    fat emulsion  250 mL IntraVENous Q MWF    miconazole   Topical BID    aluminum sulfate-calcium acetate  1 packet Topical 4x Daily    menthol-zinc oxide   Topical 4x Daily    sodium chloride flush  5-40 mL IntraVENous 2 times per day    enoxaparin  40 mg SubCUTAneous Daily    levothyroxine  75 mcg Oral Daily    loperamide  2 mg Oral 4x Daily    sertraline  50 mg Oral Daily    traZODone  100 mg Oral Nightly    pantoprazole  40 mg IntraVENous BID     Continuous Infusions:   PN-Adult 2-in-1 Central Line (Standard)      PN-Adult 2-in-1 Central Line (Standard) 65 mL/hr at 08/26/24 1806    sodium chloride      dextrose      sodium chloride       PRN Meds:sodium chloride flush, sodium chloride, oxyCODONE-acetaminophen, glucose, dextrose bolus **OR** dextrose bolus, glucagon (rDNA), dextrose, ipratropium 0.5 mg-albuterol 2.5 mg, white petrolatum, sodium chloride flush, sodium chloride, potassium chloride **OR** potassium alternative oral replacement **OR** potassium chloride, magnesium sulfate, ondansetron **OR** ondansetron, polyethylene glycol, acetaminophen **OR** acetaminophen, morphine **OR** morphine, diphenhydrAMINE    OBJECTIVE:  BP (!) 146/71   Pulse 50   Temp 98.4 °F (36.9 °C) (Oral)   Resp 16   Ht 1.549 m (5' 1\")   Wt 59.1 kg (130 lb 3.2 oz)   SpO2 97%   BMI 24.60 kg/m²   Temp

## 2024-08-27 NOTE — PROGRESS NOTES
Pharmacy Consultation Note  (Antibiotic Dosing and Monitoring)    Initial consult date: 8/21  Consulting physician/provider: Dmitri  Drug: Vancomycin  Indication: Bloodstream infection    Age/  Gender Height Weight IBW  Allergy Information   59 y.o./female 154.9 cm (5' 1\") 68 kg (150 lb)     Ideal body weight: 47.8 kg (105 lb 6.1 oz)  Adjusted ideal body weight: 52.3 kg (115 lb 4.9 oz)   Dilaudid [hydromorphone hcl], Cocoa, Lactose, Lyrica [pregabalin], Phenergan [promethazine hcl], and Phenytoin sodium extended      Renal Function:  No results for input(s): \"BUN\", \"CREATININE\" in the last 72 hours.      Intake/Output Summary (Last 24 hours) at 8/27/2024 1202  Last data filed at 8/27/2024 0408  Gross per 24 hour   Intake 200 ml   Output 1185 ml   Net -985 ml       Vancomycin Monitoring:  Trough:    No results for input(s): \"VANCOTROUGH\" in the last 72 hours.    Random:  No results for input(s): \"VANCORANDOM\" in the last 72 hours.      Assessment:  Patient is a 59 y.o. female who has been initiated on vancomycin  Estimated Creatinine Clearance: 47 mL/min (A) (based on SCr of 1.1 mg/dL (H)).  No history of vancomycin levels in EPIC  Patient received vancomycin 1250 mg IV x 1 on 8/19 (1457)  Patient received no vancomycin on 8/20.  Last serum creatinine was 8/24.    Plan:  Continue vancomycin 1250 mg IV q24h   Serum creatinine lab ordered with morning labs on 8/28.  Will continue to monitor renal function   Pharmacy to follow    Jonah Mart RPH, PharmD, 8/27/2024 12:02 PM      ALTAGRACIA: [phone]  SEY: [phone]  SJW: [phone]

## 2024-08-27 NOTE — PROGRESS NOTES
Occupational Therapy    Attempted OT treatment, patient declined. Patient reporting she has already been up with therapy this date and was feeling sore. OT to re-attempt at a later date/time as able and appropriate.     Roya Alejo, OTR/L  License Number: [id number]

## 2024-08-28 VITALS
BODY MASS INDEX: 25.85 KG/M2 | DIASTOLIC BLOOD PRESSURE: 62 MMHG | HEART RATE: 60 BPM | RESPIRATION RATE: 18 BRPM | OXYGEN SATURATION: 96 % | HEIGHT: 61 IN | TEMPERATURE: 97.9 F | WEIGHT: 136.9 LBS | SYSTOLIC BLOOD PRESSURE: 102 MMHG

## 2024-08-28 LAB
ALBUMIN SERPL-MCNC: 3.2 G/DL (ref 3.5–5.2)
ALP SERPL-CCNC: 86 U/L (ref 35–104)
ALT SERPL-CCNC: 22 U/L (ref 0–32)
AST SERPL-CCNC: 20 U/L (ref 0–31)
BILIRUB DIRECT SERPL-MCNC: <0.2 MG/DL (ref 0–0.3)
BILIRUB INDIRECT SERPL-MCNC: ABNORMAL MG/DL (ref 0–1)
BILIRUB SERPL-MCNC: 0.3 MG/DL (ref 0–1.2)
CREAT SERPL-MCNC: 0.9 MG/DL (ref 0.5–1)
GFR, ESTIMATED: 80 ML/MIN/1.73M2
MAGNESIUM SERPL-MCNC: 1.8 MG/DL (ref 1.6–2.6)
PHOSPHATE SERPL-MCNC: 2.9 MG/DL (ref 2.5–4.5)
PROT SERPL-MCNC: 6 G/DL (ref 6.4–8.3)

## 2024-08-28 PROCEDURE — 99239 HOSP IP/OBS DSCHRG MGMT >30: CPT | Performed by: INTERNAL MEDICINE

## 2024-08-28 PROCEDURE — 84100 ASSAY OF PHOSPHORUS: CPT

## 2024-08-28 PROCEDURE — 6360000002 HC RX W HCPCS: Performed by: SPECIALIST

## 2024-08-28 PROCEDURE — 6360000002 HC RX W HCPCS

## 2024-08-28 PROCEDURE — 6360000002 HC RX W HCPCS: Performed by: STUDENT IN AN ORGANIZED HEALTH CARE EDUCATION/TRAINING PROGRAM

## 2024-08-28 PROCEDURE — 80076 HEPATIC FUNCTION PANEL: CPT

## 2024-08-28 PROCEDURE — 83735 ASSAY OF MAGNESIUM: CPT

## 2024-08-28 PROCEDURE — 82565 ASSAY OF CREATININE: CPT

## 2024-08-28 PROCEDURE — 2580000003 HC RX 258: Performed by: SPECIALIST

## 2024-08-28 PROCEDURE — 6370000000 HC RX 637 (ALT 250 FOR IP): Performed by: INTERNAL MEDICINE

## 2024-08-28 PROCEDURE — 6360000002 HC RX W HCPCS: Performed by: INTERNAL MEDICINE

## 2024-08-28 PROCEDURE — 6370000000 HC RX 637 (ALT 250 FOR IP): Performed by: STUDENT IN AN ORGANIZED HEALTH CARE EDUCATION/TRAINING PROGRAM

## 2024-08-28 RX ORDER — HEPARIN 100 UNIT/ML
SYRINGE INTRAVENOUS
Status: COMPLETED
Start: 2024-08-28 | End: 2024-08-28

## 2024-08-28 RX ORDER — OXYCODONE AND ACETAMINOPHEN 5; 325 MG/1; MG/1
1 TABLET ORAL EVERY 6 HOURS PRN
Qty: 16 TABLET | Refills: 0 | Status: SHIPPED | OUTPATIENT
Start: 2024-08-28 | End: 2024-09-01

## 2024-08-28 RX ORDER — ACETAMINOPHEN 325 MG/1
650 TABLET ORAL EVERY 6 HOURS PRN
Status: DISCONTINUED | OUTPATIENT
Start: 2024-08-28 | End: 2024-08-28 | Stop reason: HOSPADM

## 2024-08-28 RX ORDER — CLOTRIMAZOLE 10 MG/1
10 LOZENGE ORAL
Qty: 50 TABLET | Refills: 0 | Status: SHIPPED | OUTPATIENT
Start: 2024-08-28 | End: 2024-09-07

## 2024-08-28 RX ORDER — ACETAMINOPHEN 650 MG/1
650 SUPPOSITORY RECTAL EVERY 6 HOURS PRN
Status: DISCONTINUED | OUTPATIENT
Start: 2024-08-28 | End: 2024-08-28 | Stop reason: HOSPADM

## 2024-08-28 RX ORDER — HEPARIN 100 UNIT/ML
SYRINGE INTRAVENOUS
Status: DISCONTINUED
Start: 2024-08-28 | End: 2024-08-28 | Stop reason: HOSPADM

## 2024-08-28 RX ADMIN — SODIUM CHLORIDE, PRESERVATIVE FREE 10 ML: 5 INJECTION INTRAVENOUS at 07:21

## 2024-08-28 RX ADMIN — ANORECTAL OINTMENT: 15.7; .44; 24; 20.6 OINTMENT TOPICAL at 07:23

## 2024-08-28 RX ADMIN — OXYCODONE HYDROCHLORIDE AND ACETAMINOPHEN 1 TABLET: 5; 325 TABLET ORAL at 07:20

## 2024-08-28 RX ADMIN — VANCOMYCIN HYDROCHLORIDE 1250 MG: 10 INJECTION, POWDER, LYOPHILIZED, FOR SOLUTION INTRAVENOUS at 07:19

## 2024-08-28 RX ADMIN — PETROLATUM: 420 OINTMENT TOPICAL at 07:22

## 2024-08-28 RX ADMIN — SODIUM CHLORIDE, PRESERVATIVE FREE 10 ML: 5 INJECTION INTRAVENOUS at 07:24

## 2024-08-28 RX ADMIN — LOPERAMIDE HYDROCHLORIDE 2 MG: 2 CAPSULE ORAL at 11:18

## 2024-08-28 RX ADMIN — MICONAZOLE NITRATE: 2 POWDER TOPICAL at 07:22

## 2024-08-28 RX ADMIN — ENOXAPARIN SODIUM 40 MG: 100 INJECTION SUBCUTANEOUS at 07:23

## 2024-08-28 RX ADMIN — SERTRALINE 50 MG: 50 TABLET, FILM COATED ORAL at 07:19

## 2024-08-28 RX ADMIN — OXYCODONE HYDROCHLORIDE AND ACETAMINOPHEN 1 TABLET: 5; 325 TABLET ORAL at 11:18

## 2024-08-28 RX ADMIN — LEVOTHYROXINE SODIUM 75 MCG: 75 TABLET ORAL at 06:07

## 2024-08-28 RX ADMIN — Medication: at 11:18

## 2024-08-28 RX ADMIN — LOPERAMIDE HYDROCHLORIDE 2 MG: 2 CAPSULE ORAL at 07:19

## 2024-08-28 RX ADMIN — MORPHINE SULFATE 4 MG: 4 INJECTION, SOLUTION INTRAMUSCULAR; INTRAVENOUS at 06:07

## 2024-08-28 RX ADMIN — MORPHINE SULFATE 2 MG: 2 INJECTION, SOLUTION INTRAMUSCULAR; INTRAVENOUS at 00:29

## 2024-08-28 RX ADMIN — HEPARIN 500 UNITS: 100 SYRINGE at 11:20

## 2024-08-28 RX ADMIN — CLOTRIMAZOLE 10 MG: 10 LOZENGE ORAL at 06:12

## 2024-08-28 RX ADMIN — PANTOPRAZOLE SODIUM 40 MG: 40 INJECTION, POWDER, FOR SOLUTION INTRAVENOUS at 07:21

## 2024-08-28 RX ADMIN — CLOTRIMAZOLE 10 MG: 10 LOZENGE ORAL at 00:26

## 2024-08-28 ASSESSMENT — PAIN SCALES - GENERAL
PAINLEVEL_OUTOF10: 0
PAINLEVEL_OUTOF10: 8
PAINLEVEL_OUTOF10: 7

## 2024-08-28 ASSESSMENT — PAIN DESCRIPTION - LOCATION
LOCATION: ABDOMEN

## 2024-08-28 ASSESSMENT — PAIN DESCRIPTION - ORIENTATION
ORIENTATION: MID
ORIENTATION: MID

## 2024-08-28 ASSESSMENT — PAIN DESCRIPTION - DESCRIPTORS
DESCRIPTORS: ACHING
DESCRIPTORS: SHARP;ACHING;THROBBING
DESCRIPTORS: ACHING

## 2024-08-28 NOTE — DISCHARGE SUMMARY
Physician Discharge Summary     Patient ID:  Maryam Santana  [id number]  59 y.o.  1965    Admit date: 8/19/2024    Discharge date and time:  8/28/2024    Discharge Diagnoses: Principal Problem:    Sepsis (HCC)  Active Problems:    Bacteremia    Crohn's disease (HCC)    Enterocutaneous fistula    Type 2 diabetes mellitus, without long-term current use of insulin (HCC)    Lactic acidosis  Resolved Problems:    * No resolved hospital problems. *      Consults: IP CONSULT TO GENERAL SURGERY  IP CONSULT TO INFECTIOUS DISEASES  IP CONSULT TO SOCIAL WORK  IP CONSULT TO DIETITIAN  IP CONSULT TO GI  IP CONSULT TO VASCULAR ACCESS TEAM  IP CONSULT TO PHARMACY  IP CONSULT TO VASCULAR ACCESS TEAM  IP CONSULT TO VASCULAR ACCESS TEAM  IP CONSULT TO HOME CARE NEEDS    Procedures: See below    Hospital Course:     59 y.o. female    Pcp- Matthew Russell MD   past medical history HTN, asthma,colon cancer?   Crohn's disease- previously on humira  One year ago sp hysterectomy - bowel perforation- with colostomy bag since-- she used to also be on TPN  Hx of  enterocutaneous fistula,-- with new exac of large amount of  abd/umbilical area/fistula drainage of bilous-yellow large amount/liquis ? stool material-- in past had used barrier cream, but reports abd area around umbilicus is red/tender and irritated.  At now colostomy bag site, no issues-- in fact much less stool there now     +chiils but no fevers at home  But in ER temp 101, anxious RR 38,   New fevers and severe onset abd pain +skin irritation and new leakage fistual     Admit with   concerns for sepsis, +crohns and enterocutaneous fistula +in march Hx of PICC bacteremia in past   Last admission in March by our group: sent in +blood cx  \"presents to the ED with positive blood cultures.  Patient has been on TPN treatment since May 2023, following abdominal surgery and development of enterocutaneous fistulas.   + pain, redness, and swelling around her PICC insertion site in  her right upper arm.    positive for Rhizobium Radiobacter.    Reports her PICC line is much improved with just some slight redness around the insertion site.   + chronic nausea and has complained of some intermittent abdominal pain starting today. Denies increased ileostomy output.  +history of CLABSI in December, tip culture was positive for Klebsiella and MSSA. Patient was followed by ID and treated with Zosyn.    CT scan A/P showed no acute intra-abdominal or pelvic process.    BUN and creatinine were elevated at 39 and 1.3.    Lactic acid was mildly elevated at 2.2.    TPN held and placed on iv fluids/dextrose. Pt improved with abx.\"    Severe sepsis, abd wall cellulitis, bacteremia- fever, tachycardia, infection present on arrival, with HoTN. S/p vancpo+zosyn, now cefepime+fluconazole, ID consult appreciated and adjust as indicated, RVP negative, Bcx+ GPC, may need mediport removal. Does also report having an abscess to superior fistula site which ruptured 1w prior to admit--cefepime, vancomycin, Diflucan--vancomycin with discharge for 5 more days    Gram-positive bacteremia for 4 blood cultures positive for GPC Staph epidermidis per PCR likely infected Mediport.  Status post Mediport removal 8/22 temporary TLC placed.  Plan for Mediport placement Monday   ID Consult appreciated dscd case continue vancomycin    Crohn's w/ enterocutaneous fistulas- copious yellow drainage/stool, excoriations to surrounding skin of umbilicus, wound care following, cont barrier cream. Gen Surg c/s, appreciate input. Possible Crohn's flare?, off steroids. NPO with TPN.  General surgery consult appreciated plan for central line today for continued TPN    Colostomy- since bowel perforation during hysterectomy. Currently with decreased stool output to ostomy, c/f leaking at fistula sites. Will need eventual outpatient [facility] evaluation for small bowel transplant and discussion of fistula takedown    HTN-initially HoTN on admission,

## 2024-08-28 NOTE — PROGRESS NOTES
[facility]  [facility]  Progress Note    SUBJECTIVE:  Chief Complaint   Patient presents with    Abdominal Pain     Fistula draining large amount yellow drainage.     Fever     Patient is excited about going home.  Tolerating antibiotic.    Review of systems:  As stated above in the chief complaint, otherwise negative.    Medications:  Scheduled Meds:   clotrimazole  10 mg Oral 5x Daily    nystatin  5 mL Oral 4x Daily    insulin lispro  0-4 Units SubCUTAneous Q6H    sodium chloride flush  5-40 mL IntraVENous 2 times per day    lidocaine 1 % injection  50 mg IntraDERmal Once    vancomycin  1,250 mg IntraVENous Q24H    fat emulsion  250 mL IntraVENous Q MWF    miconazole   Topical BID    aluminum sulfate-calcium acetate  1 packet Topical 4x Daily    menthol-zinc oxide   Topical 4x Daily    sodium chloride flush  5-40 mL IntraVENous 2 times per day    enoxaparin  40 mg SubCUTAneous Daily    levothyroxine  75 mcg Oral Daily    loperamide  2 mg Oral 4x Daily    sertraline  50 mg Oral Daily    traZODone  100 mg Oral Nightly    pantoprazole  40 mg IntraVENous BID     Continuous Infusions:   PN-Adult 2-in-1 Central Line (Standard)      PN-Adult 2-in-1 Central Line (Standard) 65 mL/hr at 08/27/24 1803    sodium chloride      dextrose      sodium chloride       PRN Meds:acetaminophen **OR** acetaminophen, medicated lip balm, sodium chloride flush, sodium chloride, oxyCODONE-acetaminophen, glucose, dextrose bolus **OR** dextrose bolus, glucagon (rDNA), dextrose, ipratropium 0.5 mg-albuterol 2.5 mg, white petrolatum, sodium chloride flush, sodium chloride, potassium chloride **OR** potassium alternative oral replacement **OR** potassium chloride, magnesium sulfate, ondansetron **OR** ondansetron, polyethylene glycol, diphenhydrAMINE    OBJECTIVE:  /62   Pulse 60   Temp 97.9 °F (36.6 °C) (Oral)   Resp 18   Ht 1.549 m (5' 1\")   Wt 62.1 kg (136 lb 14.4 oz)   SpO2 96%   BMI 25.87 kg/m²  ID standpoint    Spoke with nursing.    Cody Rizvi MD  9:24 AM  8/28/2024

## 2024-08-28 NOTE — PROGRESS NOTES
Occupational Therapy  Pt seen today to address LB ADL needs as identified on initial evaluation.  Pt laying in the bed.  Dressed and ready to go home.  She stated that she was able to dress herself but she does have difficulty with LB ADL due to orthopedic issues.  Offered adaptive equipment to increase independence and ease of LB dressing and bathing.  Pt declined and stated she has adaptive equipment at home.  She declined any other OT needs at this time.  States her only need is \"getting her walking papers\".   Chayito SANCHEZ/AUGIE [id number]

## 2024-08-28 NOTE — PROGRESS NOTES
Pharmacy Consultation Note  (Antibiotic Dosing and Monitoring)    Initial consult date: 8/21  Consulting physician/provider: Dmitri  Drug: Vancomycin  Indication: Bloodstream infection    Age/  Gender Height Weight IBW  Allergy Information   59 y.o./female 154.9 cm (5' 1\") 68 kg (150 lb)     Ideal body weight: 47.8 kg (105 lb 6.1 oz)  Adjusted ideal body weight: 53.5 kg (117 lb 15.8 oz)   Dilaudid [hydromorphone hcl], Cocoa, Lactose, Lyrica [pregabalin], Phenergan [promethazine hcl], and Phenytoin sodium extended      Renal Function:  Recent Labs     08/27/24  0500 08/28/24  0512   BUN 20  --    CREATININE 0.9 0.9         Intake/Output Summary (Last 24 hours) at 8/28/2024 1126  Last data filed at 8/27/2024 1804  Gross per 24 hour   Intake 2525 ml   Output 400 ml   Net 2125 ml       Vancomycin Monitoring:  Trough:    No results for input(s): \"VANCOTROUGH\" in the last 72 hours.    Random:  No results for input(s): \"VANCORANDOM\" in the last 72 hours.      Assessment:  Patient is a 59 y.o. female who has been initiated on vancomycin  Estimated Creatinine Clearance: 58 mL/min (based on SCr of 0.9 mg/dL).  No history of vancomycin levels in EPIC  Patient received vancomycin 1250 mg IV x 1 on 8/19 (1457)  Patient received no vancomycin on 8/20.  Serum creatinine is 0.9 mg/dLon 8/28.    Plan:  Continue vancomycin 1250 mg IV q24h   Will continue to monitor renal function   Pharmacy to follow    Jonah Mart RPH, PharmD, 8/28/2024 11:26 AM      SEB: [phone]  SEY: [phone]  SJW: [phone]

## 2024-08-28 NOTE — PROGRESS NOTES
[facility] Hospitalist Progress Note    Admitting Date and Time: 8/19/2024 10:53 AM  Admit Dx: Hypokalemia [E87.6]  Lactic acidosis [E87.20]  NEGRO (acute kidney injury) (HCC) [N17.9]  Sepsis (HCC) [A41.9]  Sepsis, due to unspecified organism, unspecified whether acute organ dysfunction present (HCC) [A41.9]    Subjective:  Patient is being followed for Hypokalemia [E87.6]  Lactic acidosis [E87.20]  NEGRO (acute kidney injury) (HCC) [N17.9]  Sepsis (HCC) [A41.9]  Sepsis, due to unspecified organism, unspecified whether acute organ dysfunction present (HCC) [A41.9]   Feeling better without complaints excited to go home  No CP or SOB  No fever or chills   No uncontrolled pain  No vomiting or diarrhea   No events reported overnight  Chart reviewed      clotrimazole  10 mg Oral 5x Daily    nystatin  5 mL Oral 4x Daily    insulin lispro  0-4 Units SubCUTAneous Q6H    sodium chloride flush  5-40 mL IntraVENous 2 times per day    lidocaine 1 % injection  50 mg IntraDERmal Once    vancomycin  1,250 mg IntraVENous Q24H    fat emulsion  250 mL IntraVENous Q MWF    miconazole   Topical BID    aluminum sulfate-calcium acetate  1 packet Topical 4x Daily    menthol-zinc oxide   Topical 4x Daily    sodium chloride flush  5-40 mL IntraVENous 2 times per day    enoxaparin  40 mg SubCUTAneous Daily    levothyroxine  75 mcg Oral Daily    loperamide  2 mg Oral 4x Daily    sertraline  50 mg Oral Daily    traZODone  100 mg Oral Nightly    pantoprazole  40 mg IntraVENous BID     acetaminophen, 650 mg, Q6H PRN   Or  acetaminophen, 650 mg, Q6H PRN  medicated lip balm, , PRN  sodium chloride flush, 5-40 mL, PRN  sodium chloride, , PRN  oxyCODONE-acetaminophen, 1 tablet, Q4H PRN  glucose, 4 tablet, PRN  dextrose bolus, 125 mL, PRN   Or  dextrose bolus, 250 mL, PRN  glucagon (rDNA), 1 mg, PRN  dextrose, , Continuous PRN  ipratropium 0.5 mg-albuterol 2.5 mg, 1 Dose, Q4H PRN  white petrolatum, , BID PRN  sodium chloride flush, 5-40 mL,

## 2024-08-28 NOTE — CARE COORDINATION
Social work:    Social service notified Patricia at [facility] and May at [facility] of confirmed discharge as expected for today.  Charge RN is reaching out to surgery resident for an order permitting TPN hold for 2 hrs a day in order to obtain vanco IV.     Electronically signed by MENG Graham on 8/28/2024 at 10:45 AM    Addendum:    Leyla at [facility] advised that they will run the TPN12 hours at night and will not need the order to hold the TPN. Charge RN was updated.    Electronically signed by MENG Graham on 8/28/2024 at 10:49 AM

## 2024-09-02 LAB
ALBUMIN SERPL-MCNC: 4.1 G/DL (ref 3.5–5.2)
ALP SERPL-CCNC: 129 U/L (ref 35–104)
ALT SERPL-CCNC: 15 U/L (ref 0–32)
ANION GAP SERPL CALCULATED.3IONS-SCNC: 14 MMOL/L (ref 7–16)
AST SERPL-CCNC: 19 U/L (ref 0–31)
BASOPHILS # BLD: 0.04 K/UL (ref 0–0.2)
BASOPHILS NFR BLD: 1 % (ref 0–2)
BILIRUB SERPL-MCNC: 0.3 MG/DL (ref 0–1.2)
BUN SERPL-MCNC: 29 MG/DL (ref 6–20)
CALCIUM SERPL-MCNC: 9.7 MG/DL (ref 8.6–10.2)
CHLORIDE SERPL-SCNC: 100 MMOL/L (ref 98–107)
CK SERPL-CCNC: 50 U/L (ref 20–180)
CO2 SERPL-SCNC: 26 MMOL/L (ref 22–29)
CREAT SERPL-MCNC: 0.9 MG/DL (ref 0.5–1)
CRP SERPL HS-MCNC: 6.5 MG/L (ref 0–3)
CRP SERPL HS-MCNC: 7 MG/L (ref 0–5)
DATE LAST DOSE: NORMAL
EOSINOPHIL # BLD: 0.36 K/UL (ref 0.05–0.5)
EOSINOPHILS RELATIVE PERCENT: 4 % (ref 0–6)
ERYTHROCYTE [DISTWIDTH] IN BLOOD BY AUTOMATED COUNT: 15 % (ref 11.5–15)
ERYTHROCYTE [SEDIMENTATION RATE] IN BLOOD BY WESTERGREN METHOD: 27 MM/HR (ref 0–20)
GFR, ESTIMATED: 77 ML/MIN/1.73M2
GLUCOSE SERPL-MCNC: 112 MG/DL (ref 74–99)
HCT VFR BLD AUTO: 44 % (ref 34–48)
HGB BLD-MCNC: 14.3 G/DL (ref 11.5–15.5)
IMM GRANULOCYTES # BLD AUTO: 0.03 K/UL (ref 0–0.58)
IMM GRANULOCYTES NFR BLD: 0 % (ref 0–5)
LYMPHOCYTES NFR BLD: 2.43 K/UL (ref 1.5–4)
LYMPHOCYTES RELATIVE PERCENT: 28 % (ref 20–42)
MCH RBC QN AUTO: 29.1 PG (ref 26–35)
MCHC RBC AUTO-ENTMCNC: 32.5 G/DL (ref 32–34.5)
MCV RBC AUTO: 89.4 FL (ref 80–99.9)
MONOCYTES NFR BLD: 0.67 K/UL (ref 0.1–0.95)
MONOCYTES NFR BLD: 8 % (ref 2–12)
NEUTROPHILS NFR BLD: 59 % (ref 43–80)
NEUTS SEG NFR BLD: 5.03 K/UL (ref 1.8–7.3)
PLATELET # BLD AUTO: 222 K/UL (ref 130–450)
PMV BLD AUTO: 10.1 FL (ref 7–12)
POTASSIUM SERPL-SCNC: 3.9 MMOL/L (ref 3.5–5)
PROT SERPL-MCNC: 7.5 G/DL (ref 6.4–8.3)
RBC # BLD AUTO: 4.92 M/UL (ref 3.5–5.5)
SODIUM SERPL-SCNC: 140 MMOL/L (ref 132–146)
TME LAST DOSE: NORMAL H
VANCOMYCIN DOSE: NORMAL MG
VANCOMYCIN TROUGH SERPL-MCNC: 9.5 UG/ML (ref 5–16)
WBC OTHER # BLD: 8.6 K/UL (ref 4.5–11.5)

## 2024-09-05 LAB
ALBUMIN SERPL-MCNC: 4.1 G/DL (ref 3.5–5.2)
ALP SERPL-CCNC: 130 U/L (ref 35–104)
ALT SERPL-CCNC: 17 U/L (ref 0–32)
ANION GAP SERPL CALCULATED.3IONS-SCNC: 17 MMOL/L (ref 7–16)
AST SERPL-CCNC: 22 U/L (ref 0–31)
BASOPHILS # BLD: 0.07 K/UL (ref 0–0.2)
BASOPHILS NFR BLD: 1 % (ref 0–2)
BILIRUB SERPL-MCNC: 0.2 MG/DL (ref 0–1.2)
BUN SERPL-MCNC: 30 MG/DL (ref 6–20)
CALCIUM SERPL-MCNC: 9.9 MG/DL (ref 8.6–10.2)
CHLORIDE SERPL-SCNC: 95 MMOL/L (ref 98–107)
CO2 SERPL-SCNC: 27 MMOL/L (ref 22–29)
CREAT SERPL-MCNC: 0.9 MG/DL (ref 0.5–1)
EOSINOPHIL # BLD: 0.4 K/UL (ref 0.05–0.5)
EOSINOPHILS RELATIVE PERCENT: 5 % (ref 0–6)
ERYTHROCYTE [DISTWIDTH] IN BLOOD BY AUTOMATED COUNT: 14.6 % (ref 11.5–15)
GFR, ESTIMATED: 73 ML/MIN/1.73M2
GLUCOSE SERPL-MCNC: 123 MG/DL (ref 74–99)
HCT VFR BLD AUTO: 39.1 % (ref 34–48)
HGB BLD-MCNC: 13 G/DL (ref 11.5–15.5)
IMM GRANULOCYTES # BLD AUTO: 0.03 K/UL (ref 0–0.58)
IMM GRANULOCYTES NFR BLD: 0 % (ref 0–5)
LYMPHOCYTES NFR BLD: 2.89 K/UL (ref 1.5–4)
LYMPHOCYTES RELATIVE PERCENT: 34 % (ref 20–42)
MCH RBC QN AUTO: 30.2 PG (ref 26–35)
MCHC RBC AUTO-ENTMCNC: 33.2 G/DL (ref 32–34.5)
MCV RBC AUTO: 90.7 FL (ref 80–99.9)
MONOCYTES NFR BLD: 0.72 K/UL (ref 0.1–0.95)
MONOCYTES NFR BLD: 8 % (ref 2–12)
NEUTROPHILS NFR BLD: 52 % (ref 43–80)
NEUTS SEG NFR BLD: 4.51 K/UL (ref 1.8–7.3)
PLATELET # BLD AUTO: 235 K/UL (ref 130–450)
PMV BLD AUTO: 10.2 FL (ref 7–12)
POTASSIUM SERPL-SCNC: 3.5 MMOL/L (ref 3.5–5)
PROT SERPL-MCNC: 7.7 G/DL (ref 6.4–8.3)
RBC # BLD AUTO: 4.31 M/UL (ref 3.5–5.5)
SODIUM SERPL-SCNC: 139 MMOL/L (ref 132–146)
WBC OTHER # BLD: 8.6 K/UL (ref 4.5–11.5)

## 2024-09-05 NOTE — PROGRESS NOTES
Physician Progress Note      PATIENT:               FREDDY MALCOLM  [facility] #:                  [id number]  :                       1965  ADMIT DATE:       2024 10:53 AM  DISCH DATE:        2024 2:12 PM  RESPONDING  PROVIDER #:        Asaf Soto MD          QUERY TEXT:    Pt admitted with sepsis.  Pt noted to have a mediport s/p removal . If   possible, please document in the progress notes and discharge summary if you   are evaluating and / or treating any of the following:    The medical record reflects the following:  Risk Factors: Mediport, enterocutaneous fistulas  Clinical Indicators: Progress Note ID  \"...Gram-positive bacteremia for 4   blood cultures positive for GPC Staph epidermidis per PCR likely infected   Mediport.  Status post Mediport removal ...\", Progress Note    \"...CLABSI secondary to infected Mediport.  Status post removal 2024.    Tip culture was positive with for coccus epidermidis...High-grade   Staphylococcus epidermidis bacteremia...\"  Treatment: IV Abx, Labs, ID Consult, Mediport removal      Thank you,  Tirso Christianson, BSN, RN, CRCR  Clinical Documentation Integrity  [phone]  Options provided:  -- Sepsis related to Mediport  -- Sepsis unrelated to Mediport  -- Other - I will add my own diagnosis  -- Disagree - Not applicable / Not valid  -- Disagree - Clinically unable to determine / Unknown  -- Refer to Clinical Documentation Reviewer    PROVIDER RESPONSE TEXT:    This patient has sepsis related to mediport.    Query created by: Tirso Christianson on 2024 1:45 PM      Electronically signed by:  Asaf Soto MD 2024 4:03 PM

## 2024-09-09 LAB
ALBUMIN SERPL-MCNC: 4 G/DL (ref 3.5–5.2)
ALP SERPL-CCNC: 129 U/L (ref 35–104)
ALT SERPL-CCNC: 17 U/L (ref 0–32)
ANION GAP SERPL CALCULATED.3IONS-SCNC: 15 MMOL/L (ref 7–16)
AST SERPL-CCNC: 21 U/L (ref 0–31)
BASOPHILS # BLD: 0.03 K/UL (ref 0–0.2)
BASOPHILS NFR BLD: 0 % (ref 0–2)
BILIRUB SERPL-MCNC: 0.2 MG/DL (ref 0–1.2)
BUN SERPL-MCNC: 27 MG/DL (ref 6–20)
CALCIUM SERPL-MCNC: 9.9 MG/DL (ref 8.6–10.2)
CHLORIDE SERPL-SCNC: 94 MMOL/L (ref 98–107)
CO2 SERPL-SCNC: 29 MMOL/L (ref 22–29)
CREAT SERPL-MCNC: 0.8 MG/DL (ref 0.5–1)
EOSINOPHIL # BLD: 0.27 K/UL (ref 0.05–0.5)
EOSINOPHILS RELATIVE PERCENT: 4 % (ref 0–6)
ERYTHROCYTE [DISTWIDTH] IN BLOOD BY AUTOMATED COUNT: 14.8 % (ref 11.5–15)
GFR, ESTIMATED: 81 ML/MIN/1.73M2
GLUCOSE SERPL-MCNC: 122 MG/DL (ref 74–99)
HCT VFR BLD AUTO: 37.3 % (ref 34–48)
HGB BLD-MCNC: 12.6 G/DL (ref 11.5–15.5)
IMM GRANULOCYTES # BLD AUTO: <0.03 K/UL (ref 0–0.58)
IMM GRANULOCYTES NFR BLD: 0 % (ref 0–5)
LYMPHOCYTES NFR BLD: 2.25 K/UL (ref 1.5–4)
LYMPHOCYTES RELATIVE PERCENT: 32 % (ref 20–42)
MAGNESIUM SERPL-MCNC: 1.8 MG/DL (ref 1.6–2.6)
MCH RBC QN AUTO: 30.4 PG (ref 26–35)
MCHC RBC AUTO-ENTMCNC: 33.8 G/DL (ref 32–34.5)
MCV RBC AUTO: 90.1 FL (ref 80–99.9)
MONOCYTES NFR BLD: 0.57 K/UL (ref 0.1–0.95)
MONOCYTES NFR BLD: 8 % (ref 2–12)
NEUTROPHILS NFR BLD: 56 % (ref 43–80)
NEUTS SEG NFR BLD: 3.99 K/UL (ref 1.8–7.3)
PHOSPHATE SERPL-MCNC: 3 MG/DL (ref 2.5–4.5)
PLATELET # BLD AUTO: 231 K/UL (ref 130–450)
PMV BLD AUTO: 9.9 FL (ref 7–12)
POTASSIUM SERPL-SCNC: 3.2 MMOL/L (ref 3.5–5)
PROT SERPL-MCNC: 7.6 G/DL (ref 6.4–8.3)
RBC # BLD AUTO: 4.14 M/UL (ref 3.5–5.5)
SODIUM SERPL-SCNC: 138 MMOL/L (ref 132–146)
WBC OTHER # BLD: 7.1 K/UL (ref 4.5–11.5)

## 2024-09-16 LAB
ALBUMIN SERPL-MCNC: 3.9 G/DL (ref 3.5–5.2)
ALP SERPL-CCNC: 131 U/L (ref 35–104)
ALT SERPL-CCNC: 12 U/L (ref 0–32)
ANION GAP SERPL CALCULATED.3IONS-SCNC: 15 MMOL/L (ref 7–16)
AST SERPL-CCNC: 19 U/L (ref 0–31)
BASOPHILS # BLD: 0.03 K/UL (ref 0–0.2)
BASOPHILS NFR BLD: 0 % (ref 0–2)
BILIRUB SERPL-MCNC: 0.2 MG/DL (ref 0–1.2)
BUN SERPL-MCNC: 31 MG/DL (ref 6–20)
CALCIUM SERPL-MCNC: 9.7 MG/DL (ref 8.6–10.2)
CHLORIDE SERPL-SCNC: 92 MMOL/L (ref 98–107)
CO2 SERPL-SCNC: 31 MMOL/L (ref 22–29)
CREAT SERPL-MCNC: 1 MG/DL (ref 0.5–1)
EOSINOPHIL # BLD: 0.22 K/UL (ref 0.05–0.5)
EOSINOPHILS RELATIVE PERCENT: 3 % (ref 0–6)
ERYTHROCYTE [DISTWIDTH] IN BLOOD BY AUTOMATED COUNT: 14.4 % (ref 11.5–15)
ERYTHROCYTE [SEDIMENTATION RATE] IN BLOOD BY WESTERGREN METHOD: 42 MM/HR (ref 0–20)
GFR, ESTIMATED: 69 ML/MIN/1.73M2
GLUCOSE SERPL-MCNC: 98 MG/DL (ref 74–99)
HCT VFR BLD AUTO: 37.1 % (ref 34–48)
HGB BLD-MCNC: 12.3 G/DL (ref 11.5–15.5)
IMM GRANULOCYTES # BLD AUTO: 0.03 K/UL (ref 0–0.58)
IMM GRANULOCYTES NFR BLD: 0 % (ref 0–5)
LYMPHOCYTES NFR BLD: 3.26 K/UL (ref 1.5–4)
LYMPHOCYTES RELATIVE PERCENT: 40 % (ref 20–42)
MAGNESIUM SERPL-MCNC: 1.9 MG/DL (ref 1.6–2.6)
MCH RBC QN AUTO: 29.2 PG (ref 26–35)
MCHC RBC AUTO-ENTMCNC: 33.2 G/DL (ref 32–34.5)
MCV RBC AUTO: 88.1 FL (ref 80–99.9)
MONOCYTES NFR BLD: 0.66 K/UL (ref 0.1–0.95)
MONOCYTES NFR BLD: 8 % (ref 2–12)
NEUTROPHILS NFR BLD: 48 % (ref 43–80)
NEUTS SEG NFR BLD: 3.89 K/UL (ref 1.8–7.3)
PHOSPHATE SERPL-MCNC: 2.8 MG/DL (ref 2.5–4.5)
PLATELET # BLD AUTO: 344 K/UL (ref 130–450)
PMV BLD AUTO: 9.4 FL (ref 7–12)
POTASSIUM SERPL-SCNC: 2.8 MMOL/L (ref 3.5–5)
PROT SERPL-MCNC: 7.4 G/DL (ref 6.4–8.3)
RBC # BLD AUTO: 4.21 M/UL (ref 3.5–5.5)
SODIUM SERPL-SCNC: 138 MMOL/L (ref 132–146)
WBC OTHER # BLD: 8.1 K/UL (ref 4.5–11.5)

## 2024-09-20 ENCOUNTER — OFFICE VISIT (OUTPATIENT)
Dept: SURGERY | Age: 59
End: 2024-09-20
Payer: MEDICARE

## 2024-09-20 VITALS
HEART RATE: 79 BPM | RESPIRATION RATE: 18 BRPM | DIASTOLIC BLOOD PRESSURE: 67 MMHG | HEIGHT: 61 IN | WEIGHT: 136 LBS | TEMPERATURE: 98.2 F | SYSTOLIC BLOOD PRESSURE: 94 MMHG | BODY MASS INDEX: 25.68 KG/M2 | OXYGEN SATURATION: 98 %

## 2024-09-20 DIAGNOSIS — K50.012 CROHN'S DISEASE OF SMALL INTESTINE WITH INTESTINAL OBSTRUCTION (HCC): ICD-10-CM

## 2024-09-20 DIAGNOSIS — K63.2 ENTEROCUTANEOUS FISTULA: Primary | ICD-10-CM

## 2024-09-20 PROCEDURE — 3017F COLORECTAL CA SCREEN DOC REV: CPT | Performed by: STUDENT IN AN ORGANIZED HEALTH CARE EDUCATION/TRAINING PROGRAM

## 2024-09-20 PROCEDURE — G8419 CALC BMI OUT NRM PARAM NOF/U: HCPCS | Performed by: STUDENT IN AN ORGANIZED HEALTH CARE EDUCATION/TRAINING PROGRAM

## 2024-09-20 PROCEDURE — G8427 DOCREV CUR MEDS BY ELIG CLIN: HCPCS | Performed by: STUDENT IN AN ORGANIZED HEALTH CARE EDUCATION/TRAINING PROGRAM

## 2024-09-20 PROCEDURE — 4004F PT TOBACCO SCREEN RCVD TLK: CPT | Performed by: STUDENT IN AN ORGANIZED HEALTH CARE EDUCATION/TRAINING PROGRAM

## 2024-09-20 PROCEDURE — 3074F SYST BP LT 130 MM HG: CPT | Performed by: STUDENT IN AN ORGANIZED HEALTH CARE EDUCATION/TRAINING PROGRAM

## 2024-09-20 PROCEDURE — 99213 OFFICE O/P EST LOW 20 MIN: CPT | Performed by: STUDENT IN AN ORGANIZED HEALTH CARE EDUCATION/TRAINING PROGRAM

## 2024-09-20 PROCEDURE — 3078F DIAST BP <80 MM HG: CPT | Performed by: STUDENT IN AN ORGANIZED HEALTH CARE EDUCATION/TRAINING PROGRAM

## 2024-09-20 PROCEDURE — 1111F DSCHRG MED/CURRENT MED MERGE: CPT | Performed by: STUDENT IN AN ORGANIZED HEALTH CARE EDUCATION/TRAINING PROGRAM

## 2024-09-20 ASSESSMENT — ENCOUNTER SYMPTOMS
ABDOMINAL DISTENTION: 0
COLOR CHANGE: 0
BLOOD IN STOOL: 0
NAUSEA: 0
DIARRHEA: 0
WHEEZING: 0
CHOKING: 0
ABDOMINAL PAIN: 0
COUGH: 0
STRIDOR: 0
CONSTIPATION: 0
CHEST TIGHTNESS: 0
TROUBLE SWALLOWING: 0
APNEA: 0
VOMITING: 0
ANAL BLEEDING: 0
SHORTNESS OF BREATH: 0

## 2024-09-30 LAB
CHOLEST SERPL-MCNC: 149 MG/DL
HBA1C MFR BLD: 6.2 % (ref 4–5.6)
HDLC SERPL-MCNC: 43 MG/DL
LDLC SERPL CALC-MCNC: 83 MG/DL
TRIGL SERPL-MCNC: 116 MG/DL
VLDLC SERPL CALC-MCNC: 23 MG/DL

## 2024-10-02 LAB
CREAT SERPL-MCNC: 1.2 MG/DL (ref 0.5–1)
GFR, ESTIMATED: 53 ML/MIN/1.73M2
T3FREE SERPL-MCNC: 3.7 PG/ML (ref 2–4.4)
T4 SERPL-MCNC: 16.1 UG/DL (ref 4.5–11.7)
TSH SERPL DL<=0.05 MIU/L-ACNC: 1.68 UIU/ML (ref 0.27–4.2)

## 2024-10-07 LAB
ALBUMIN SERPL-MCNC: 3.9 G/DL (ref 3.5–5.2)
ALP SERPL-CCNC: 109 U/L (ref 35–104)
ALT SERPL-CCNC: 11 U/L (ref 0–32)
ANION GAP SERPL CALCULATED.3IONS-SCNC: 16 MMOL/L (ref 7–16)
AST SERPL-CCNC: 20 U/L (ref 0–31)
BASOPHILS # BLD: 0.03 K/UL (ref 0–0.2)
BASOPHILS NFR BLD: 0 % (ref 0–2)
BILIRUB SERPL-MCNC: 0.3 MG/DL (ref 0–1.2)
BUN SERPL-MCNC: 29 MG/DL (ref 6–20)
CALCIUM SERPL-MCNC: 9.4 MG/DL (ref 8.6–10.2)
CHLORIDE SERPL-SCNC: 95 MMOL/L (ref 98–107)
CO2 SERPL-SCNC: 28 MMOL/L (ref 22–29)
CREAT SERPL-MCNC: 0.9 MG/DL (ref 0.5–1)
EOSINOPHIL # BLD: 0.2 K/UL (ref 0.05–0.5)
EOSINOPHILS RELATIVE PERCENT: 3 % (ref 0–6)
ERYTHROCYTE [DISTWIDTH] IN BLOOD BY AUTOMATED COUNT: 14.6 % (ref 11.5–15)
GFR, ESTIMATED: 74 ML/MIN/1.73M2
GLUCOSE SERPL-MCNC: 143 MG/DL (ref 74–99)
HCT VFR BLD AUTO: 37.5 % (ref 34–48)
HGB BLD-MCNC: 12.5 G/DL (ref 11.5–15.5)
IMM GRANULOCYTES # BLD AUTO: <0.03 K/UL (ref 0–0.58)
IMM GRANULOCYTES NFR BLD: 0 % (ref 0–5)
LYMPHOCYTES NFR BLD: 2.49 K/UL (ref 1.5–4)
LYMPHOCYTES RELATIVE PERCENT: 35 % (ref 20–42)
MAGNESIUM SERPL-MCNC: 1.9 MG/DL (ref 1.6–2.6)
MCH RBC QN AUTO: 29.5 PG (ref 26–35)
MCHC RBC AUTO-ENTMCNC: 33.3 G/DL (ref 32–34.5)
MCV RBC AUTO: 88.4 FL (ref 80–99.9)
MONOCYTES NFR BLD: 0.55 K/UL (ref 0.1–0.95)
MONOCYTES NFR BLD: 8 % (ref 2–12)
NEUTROPHILS NFR BLD: 54 % (ref 43–80)
NEUTS SEG NFR BLD: 3.88 K/UL (ref 1.8–7.3)
PHOSPHATE SERPL-MCNC: 3 MG/DL (ref 2.5–4.5)
PLATELET # BLD AUTO: 250 K/UL (ref 130–450)
PMV BLD AUTO: 9.9 FL (ref 7–12)
POTASSIUM SERPL-SCNC: 3 MMOL/L (ref 3.5–5)
PROT SERPL-MCNC: 7.4 G/DL (ref 6.4–8.3)
RBC # BLD AUTO: 4.24 M/UL (ref 3.5–5.5)
SODIUM SERPL-SCNC: 139 MMOL/L (ref 132–146)
WBC OTHER # BLD: 7.2 K/UL (ref 4.5–11.5)

## 2024-10-08 LAB — TRIGL SERPL-MCNC: 172 MG/DL

## 2024-10-14 LAB — MICROALBUMIN UR-MCNC: 23 MG/L (ref 0–19)

## 2024-10-16 ENCOUNTER — TELEPHONE (OUTPATIENT)
Dept: GASTROENTEROLOGY | Age: 59
End: 2024-10-16

## 2024-10-16 ENCOUNTER — INITIAL CONSULT (OUTPATIENT)
Dept: GASTROENTEROLOGY | Age: 59
End: 2024-10-16
Payer: COMMERCIAL

## 2024-10-16 VITALS
RESPIRATION RATE: 16 BRPM | HEIGHT: 60 IN | BODY MASS INDEX: 27.09 KG/M2 | OXYGEN SATURATION: 96 % | WEIGHT: 138 LBS | SYSTOLIC BLOOD PRESSURE: 110 MMHG | TEMPERATURE: 97 F | HEART RATE: 77 BPM | DIASTOLIC BLOOD PRESSURE: 72 MMHG

## 2024-10-16 DIAGNOSIS — K63.2 ENTEROCUTANEOUS FISTULA: ICD-10-CM

## 2024-10-16 DIAGNOSIS — K50.012 CROHN'S DISEASE OF SMALL INTESTINE WITH INTESTINAL OBSTRUCTION (HCC): Primary | ICD-10-CM

## 2024-10-16 PROCEDURE — 3078F DIAST BP <80 MM HG: CPT | Performed by: STUDENT IN AN ORGANIZED HEALTH CARE EDUCATION/TRAINING PROGRAM

## 2024-10-16 PROCEDURE — G8427 DOCREV CUR MEDS BY ELIG CLIN: HCPCS | Performed by: STUDENT IN AN ORGANIZED HEALTH CARE EDUCATION/TRAINING PROGRAM

## 2024-10-16 PROCEDURE — 3017F COLORECTAL CA SCREEN DOC REV: CPT | Performed by: STUDENT IN AN ORGANIZED HEALTH CARE EDUCATION/TRAINING PROGRAM

## 2024-10-16 PROCEDURE — 3074F SYST BP LT 130 MM HG: CPT | Performed by: STUDENT IN AN ORGANIZED HEALTH CARE EDUCATION/TRAINING PROGRAM

## 2024-10-16 PROCEDURE — 4004F PT TOBACCO SCREEN RCVD TLK: CPT | Performed by: STUDENT IN AN ORGANIZED HEALTH CARE EDUCATION/TRAINING PROGRAM

## 2024-10-16 PROCEDURE — 99214 OFFICE O/P EST MOD 30 MIN: CPT | Performed by: STUDENT IN AN ORGANIZED HEALTH CARE EDUCATION/TRAINING PROGRAM

## 2024-10-16 PROCEDURE — G8419 CALC BMI OUT NRM PARAM NOF/U: HCPCS | Performed by: STUDENT IN AN ORGANIZED HEALTH CARE EDUCATION/TRAINING PROGRAM

## 2024-10-16 PROCEDURE — G8484 FLU IMMUNIZE NO ADMIN: HCPCS | Performed by: STUDENT IN AN ORGANIZED HEALTH CARE EDUCATION/TRAINING PROGRAM

## 2024-10-16 NOTE — TELEPHONE ENCOUNTER
Pt is to have an appointment at [facility]. When she gets that date she will call office to set up a 2 week follow up from that appt with dr hatch.  Electronically signed by MICAH BYRD LPN on 10/16/2024 at 2:32 PM

## 2024-10-16 NOTE — PROGRESS NOTES
Indications: MAX: 16MG/DAY      ondansetron (ZOFRAN) 4 MG tablet Take 1 tablet by mouth every 6 hours as needed for Nausea or Vomiting      silver sulfADIAZINE (SILVADENE) 1 % cream Apply topically See Admin Instructions QD & PRN SURROUNDING ABD WOUND      apixaban (ELIQUIS) 5 MG TABS tablet Take 1 tablet by mouth 2 times daily       No current facility-administered medications for this visit.       OBJECTIVE        Physical    VITALS:  /72   Pulse 77   Temp 97 °F (36.1 °C) (Temporal)   Resp 16   Ht 1.524 m (5')   Wt 62.6 kg (138 lb)   SpO2 96%   BMI 26.95 kg/m²   Physical Exam:  General: Overall well-appearing, NAD  HEENT: PERRLA, EOMI, Anicteric sclera, MMM, no rhinorrhea  Cards: RRR, no LE edema  Resp: Breathing comfortably on room air, good air movement, no use of accessory muscles, no audible wheezing  Abdomen: soft, NT, ND. Ostomy in place.   Extremities: Moves all extremities, no effusions or bruising.  Skin: No rashes or jaundice. No active drainage from fistulas.  Neuro: A&O x 3, CN grossly intact, non-focal exam       ASSESSMENT AND PLAN      59y/F with history of Crohn's disease s/p ostomy formation and resection in the past and currently with enterocutaneous fistulas who presents to clinic in follow-up from hospitalization from active fistula drainage.     PLAN:  - Continue Humira for now.  - We will see back in clinic following surgical consultation and planning at [facility]. Depending on surgical plan, will require restaging colonoscopy to assess activity of disease.  - She may require transition to another biologic given activity of disease likely leading to increased fistula drainage, but will determine this based on endoscopic findings.     I will see the patient back in clinic following surgical evaluation.    Thank you for including us in the care of this patient. Please do not hesitate to contact us with any additional questions or concerns.    Rudy Kumari,

## 2024-11-14 ENCOUNTER — TELEPHONE (OUTPATIENT)
Dept: SURGERY | Age: 59
End: 2024-11-14

## 2024-11-14 NOTE — TELEPHONE ENCOUNTER
MA contacted Dr Grimaldo's office regarding Dr Hunt's referral for evaluation of Enterocutaneous fistula and Crohn's disease of small intestine with intestinal obstruction. Per voicemail from [facility] [phone] in Dr Grimaldo's office they are working up the referral but patient will need to stop smoking before any surgical procedure can be scheduled. MA requested a return call with length of time between smoking cessation and scheduling patient. Dr Grimaldo's office will call back with information.    Electronically signed by HODAN JOHNSON MA on 11/14/2024 at 10:12 AM

## 2024-11-15 ENCOUNTER — TELEPHONE (OUTPATIENT)
Dept: SURGERY | Age: 59
End: 2024-11-15

## 2024-11-15 NOTE — TELEPHONE ENCOUNTER
MA spoke with Edie [phone] in Dr Grimaldo's office who states once patient has a negative Nicotine Cotinine they will be able to schedule consult with Dr Grimaldo. Edie states this test must be ordered by Dr Hunt as the referring physician and that the nicotine should be out of the patient's system after one week of cessation.    MA spoke with patient who states she stopped smoking on 11/11/24 on her own with no medication. MA advised our office will contact her once her testing has been ordered. Results are to be faxed to Dr Bryson's office at [phone]; attn: [facility]. MA also advised patient per Lani that she will be tested again prior to surgery. If testing is positive surgery will be cancelled. Patient verbalized understanding.    Electronically signed by HODAN JOHNSON MA on 11/15/2024 at 9:28 AM

## 2024-12-02 LAB
ERYTHROCYTE [DISTWIDTH] IN BLOOD BY AUTOMATED COUNT: 15.2 % (ref 11.5–15)
HCT VFR BLD AUTO: 37.9 % (ref 34–48)
HGB BLD-MCNC: 12.5 G/DL (ref 11.5–15.5)
MCH RBC QN AUTO: 28.9 PG (ref 26–35)
MCHC RBC AUTO-ENTMCNC: 33 G/DL (ref 32–34.5)
MCV RBC AUTO: 87.7 FL (ref 80–99.9)
PLATELET # BLD AUTO: 289 K/UL (ref 130–450)
PMV BLD AUTO: 9.5 FL (ref 7–12)
RBC # BLD AUTO: 4.32 M/UL (ref 3.5–5.5)
WBC OTHER # BLD: 8.4 K/UL (ref 4.5–11.5)

## 2024-12-03 LAB
25(OH)D3 SERPL-MCNC: 27.9 NG/ML (ref 30–100)
ALBUMIN SERPL-MCNC: 4.1 G/DL (ref 3.5–5.2)
ALP SERPL-CCNC: 119 U/L (ref 35–104)
ALT SERPL-CCNC: 20 U/L (ref 0–32)
ANION GAP SERPL CALCULATED.3IONS-SCNC: 15 MMOL/L (ref 7–16)
AST SERPL-CCNC: 31 U/L (ref 0–31)
BILIRUB SERPL-MCNC: 0.4 MG/DL (ref 0–1.2)
BUN SERPL-MCNC: 32 MG/DL (ref 6–20)
CALCIUM SERPL-MCNC: 9.9 MG/DL (ref 8.6–10.2)
CHLORIDE SERPL-SCNC: 95 MMOL/L (ref 98–107)
CO2 SERPL-SCNC: 25 MMOL/L (ref 22–29)
CREAT SERPL-MCNC: 1.2 MG/DL (ref 0.5–1)
CRP SERPL HS-MCNC: 19 MG/L (ref 0–5)
FERRITIN SERPL-MCNC: 129 NG/ML
GFR, ESTIMATED: 54 ML/MIN/1.73M2
GLUCOSE SERPL-MCNC: 85 MG/DL (ref 74–99)
IRON SATN MFR SERPL: 9 % (ref 15–50)
IRON SERPL-MCNC: 33 UG/DL (ref 37–145)
MAGNESIUM SERPL-MCNC: 1.9 MG/DL (ref 1.6–2.6)
PHOSPHATE SERPL-MCNC: 3.3 MG/DL (ref 2.5–4.5)
POTASSIUM SERPL-SCNC: 3.8 MMOL/L (ref 3.5–5)
PROT SERPL-MCNC: 8.2 G/DL (ref 6.4–8.3)
SODIUM SERPL-SCNC: 135 MMOL/L (ref 132–146)
TIBC SERPL-MCNC: 380 UG/DL (ref 250–450)
TRIGL SERPL-MCNC: 222 MG/DL
VIT B12 SERPL-MCNC: 690 PG/ML (ref 211–946)

## 2024-12-04 LAB
COPPER SERPL-MCNC: 131.7 UG/DL (ref 80–155)
SELENIUM SERPL-MCNC: 116.9 UG/L (ref 23–190)

## 2024-12-05 LAB
METHYLMALONATE SERPL-SCNC: 0.18 UMOL/L (ref 0–0.4)
ZINC SERPL-MCNC: 89.2 UG/DL (ref 60–120)

## 2024-12-16 LAB
ALBUMIN SERPL-MCNC: 4 G/DL (ref 3.5–5.2)
ALP SERPL-CCNC: 141 U/L (ref 35–104)
ALT SERPL-CCNC: 25 U/L (ref 0–32)
ANION GAP SERPL CALCULATED.3IONS-SCNC: 16 MMOL/L (ref 7–16)
AST SERPL-CCNC: 25 U/L (ref 0–31)
BASOPHILS # BLD: 0.03 K/UL (ref 0–0.2)
BASOPHILS NFR BLD: 0 % (ref 0–2)
BILIRUB SERPL-MCNC: 0.5 MG/DL (ref 0–1.2)
BUN SERPL-MCNC: 45 MG/DL (ref 6–20)
CALCIUM SERPL-MCNC: 9.9 MG/DL (ref 8.6–10.2)
CHLORIDE SERPL-SCNC: 93 MMOL/L (ref 98–107)
CO2 SERPL-SCNC: 23 MMOL/L (ref 22–29)
CREAT SERPL-MCNC: 1.2 MG/DL (ref 0.5–1)
EOSINOPHIL # BLD: 0.2 K/UL (ref 0.05–0.5)
EOSINOPHILS RELATIVE PERCENT: 2 % (ref 0–6)
ERYTHROCYTE [DISTWIDTH] IN BLOOD BY AUTOMATED COUNT: 14.6 % (ref 11.5–15)
GFR, ESTIMATED: 51 ML/MIN/1.73M2
GLUCOSE SERPL-MCNC: 109 MG/DL (ref 74–99)
HCT VFR BLD AUTO: 38.8 % (ref 34–48)
HGB BLD-MCNC: 12.9 G/DL (ref 11.5–15.5)
IMM GRANULOCYTES # BLD AUTO: 0.07 K/UL (ref 0–0.58)
IMM GRANULOCYTES NFR BLD: 1 % (ref 0–5)
LYMPHOCYTES NFR BLD: 2.78 K/UL (ref 1.5–4)
LYMPHOCYTES RELATIVE PERCENT: 25 % (ref 20–42)
MAGNESIUM SERPL-MCNC: 2.1 MG/DL (ref 1.6–2.6)
MCH RBC QN AUTO: 28.5 PG (ref 26–35)
MCHC RBC AUTO-ENTMCNC: 33.2 G/DL (ref 32–34.5)
MCV RBC AUTO: 85.8 FL (ref 80–99.9)
MONOCYTES NFR BLD: 0.96 K/UL (ref 0.1–0.95)
MONOCYTES NFR BLD: 9 % (ref 2–12)
NEUTROPHILS NFR BLD: 64 % (ref 43–80)
NEUTS SEG NFR BLD: 7.14 K/UL (ref 1.8–7.3)
PHOSPHATE SERPL-MCNC: 3.2 MG/DL (ref 2.5–4.5)
PLATELET # BLD AUTO: 366 K/UL (ref 130–450)
PMV BLD AUTO: 9 FL (ref 7–12)
POTASSIUM SERPL-SCNC: 4.9 MMOL/L (ref 3.5–5)
PROT SERPL-MCNC: 7.9 G/DL (ref 6.4–8.3)
RBC # BLD AUTO: 4.52 M/UL (ref 3.5–5.5)
SODIUM SERPL-SCNC: 132 MMOL/L (ref 132–146)
TRIGL SERPL-MCNC: 260 MG/DL
WBC OTHER # BLD: 11.2 K/UL (ref 4.5–11.5)

## 2024-12-23 ENCOUNTER — APPOINTMENT (OUTPATIENT)
Dept: CT IMAGING | Age: 59
End: 2024-12-23
Payer: MEDICARE

## 2024-12-23 ENCOUNTER — HOSPITAL ENCOUNTER (EMERGENCY)
Age: 59
Discharge: ANOTHER HOSPITAL | End: 2024-12-24
Attending: EMERGENCY MEDICINE
Payer: MEDICARE

## 2024-12-23 VITALS
WEIGHT: 135 LBS | OXYGEN SATURATION: 96 % | HEART RATE: 73 BPM | DIASTOLIC BLOOD PRESSURE: 52 MMHG | TEMPERATURE: 98.1 F | SYSTOLIC BLOOD PRESSURE: 92 MMHG | BODY MASS INDEX: 25.49 KG/M2 | HEIGHT: 61 IN | RESPIRATION RATE: 14 BRPM

## 2024-12-23 DIAGNOSIS — R10.9 ABDOMINAL PAIN, UNSPECIFIED ABDOMINAL LOCATION: Primary | ICD-10-CM

## 2024-12-23 DIAGNOSIS — L02.211 ABDOMINAL WALL ABSCESS: ICD-10-CM

## 2024-12-23 DIAGNOSIS — K63.2 ENTEROCUTANEOUS FISTULA: ICD-10-CM

## 2024-12-23 LAB
ALBUMIN SERPL-MCNC: 3.6 G/DL (ref 3.5–5.2)
ALP SERPL-CCNC: 136 U/L (ref 35–104)
ALT SERPL-CCNC: 22 U/L (ref 0–32)
ANION GAP SERPL CALCULATED.3IONS-SCNC: 14 MMOL/L (ref 7–16)
AST SERPL-CCNC: 19 U/L (ref 0–31)
BASOPHILS # BLD: 0.04 K/UL (ref 0–0.2)
BASOPHILS NFR BLD: 0 % (ref 0–2)
BILIRUB SERPL-MCNC: 0.4 MG/DL (ref 0–1.2)
BILIRUB UR QL STRIP: NEGATIVE
BUN SERPL-MCNC: 38 MG/DL (ref 6–20)
CALCIUM SERPL-MCNC: 10.1 MG/DL (ref 8.6–10.2)
CHLORIDE SERPL-SCNC: 95 MMOL/L (ref 98–107)
CLARITY UR: CLEAR
CO2 SERPL-SCNC: 21 MMOL/L (ref 22–29)
COLOR UR: YELLOW
CREAT SERPL-MCNC: 1.1 MG/DL (ref 0.5–1)
EOSINOPHIL # BLD: 0.08 K/UL (ref 0.05–0.5)
EOSINOPHILS RELATIVE PERCENT: 1 % (ref 0–6)
ERYTHROCYTE [DISTWIDTH] IN BLOOD BY AUTOMATED COUNT: 14.3 % (ref 11.5–15)
GFR, ESTIMATED: 57 ML/MIN/1.73M2
GLUCOSE SERPL-MCNC: 130 MG/DL (ref 74–99)
GLUCOSE UR STRIP-MCNC: NEGATIVE MG/DL
HCT VFR BLD AUTO: 35.4 % (ref 34–48)
HGB BLD-MCNC: 12 G/DL (ref 11.5–15.5)
HGB UR QL STRIP.AUTO: NEGATIVE
IMM GRANULOCYTES # BLD AUTO: 0.12 K/UL (ref 0–0.58)
IMM GRANULOCYTES NFR BLD: 1 % (ref 0–5)
INR PPP: 1.3
KETONES UR STRIP-MCNC: NEGATIVE MG/DL
LACTATE BLDV-SCNC: 0.9 MMOL/L (ref 0.5–2.2)
LACTATE BLDV-SCNC: 2.3 MMOL/L (ref 0.5–2.2)
LEUKOCYTE ESTERASE UR QL STRIP: ABNORMAL
LIPASE SERPL-CCNC: 27 U/L (ref 13–60)
LYMPHOCYTES NFR BLD: 2.64 K/UL (ref 1.5–4)
LYMPHOCYTES RELATIVE PERCENT: 20 % (ref 20–42)
MAGNESIUM SERPL-MCNC: 2 MG/DL (ref 1.6–2.6)
MCH RBC QN AUTO: 28.6 PG (ref 26–35)
MCHC RBC AUTO-ENTMCNC: 33.9 G/DL (ref 32–34.5)
MCV RBC AUTO: 84.3 FL (ref 80–99.9)
MONOCYTES NFR BLD: 1.19 K/UL (ref 0.1–0.95)
MONOCYTES NFR BLD: 9 % (ref 2–12)
NEUTROPHILS NFR BLD: 69 % (ref 43–80)
NEUTS SEG NFR BLD: 8.9 K/UL (ref 1.8–7.3)
NITRITE UR QL STRIP: NEGATIVE
PH UR STRIP: 6 [PH] (ref 5–9)
PLATELET # BLD AUTO: 285 K/UL (ref 130–450)
PMV BLD AUTO: 9.1 FL (ref 7–12)
POTASSIUM SERPL-SCNC: 4.3 MMOL/L (ref 3.5–5)
PROT SERPL-MCNC: 8.2 G/DL (ref 6.4–8.3)
PROT UR STRIP-MCNC: NEGATIVE MG/DL
PROTHROMBIN TIME: 14.1 SEC (ref 9.3–12.4)
RBC # BLD AUTO: 4.2 M/UL (ref 3.5–5.5)
RBC #/AREA URNS HPF: ABNORMAL /HPF
SODIUM SERPL-SCNC: 130 MMOL/L (ref 132–146)
SP GR UR STRIP: 1.01 (ref 1–1.03)
UROBILINOGEN UR STRIP-ACNC: 0.2 EU/DL (ref 0–1)
WBC #/AREA URNS HPF: ABNORMAL /HPF
WBC OTHER # BLD: 13 K/UL (ref 4.5–11.5)

## 2024-12-23 PROCEDURE — 87077 CULTURE AEROBIC IDENTIFY: CPT

## 2024-12-23 PROCEDURE — 85610 PROTHROMBIN TIME: CPT

## 2024-12-23 PROCEDURE — 80053 COMPREHEN METABOLIC PANEL: CPT

## 2024-12-23 PROCEDURE — 96365 THER/PROPH/DIAG IV INF INIT: CPT

## 2024-12-23 PROCEDURE — 87150 DNA/RNA AMPLIFIED PROBE: CPT

## 2024-12-23 PROCEDURE — 96374 THER/PROPH/DIAG INJ IV PUSH: CPT

## 2024-12-23 PROCEDURE — 87086 URINE CULTURE/COLONY COUNT: CPT

## 2024-12-23 PROCEDURE — 83735 ASSAY OF MAGNESIUM: CPT

## 2024-12-23 PROCEDURE — 96376 TX/PRO/DX INJ SAME DRUG ADON: CPT

## 2024-12-23 PROCEDURE — 83605 ASSAY OF LACTIC ACID: CPT

## 2024-12-23 PROCEDURE — 99285 EMERGENCY DEPT VISIT HI MDM: CPT

## 2024-12-23 PROCEDURE — 83690 ASSAY OF LIPASE: CPT

## 2024-12-23 PROCEDURE — 74177 CT ABD & PELVIS W/CONTRAST: CPT

## 2024-12-23 PROCEDURE — 81001 URINALYSIS AUTO W/SCOPE: CPT

## 2024-12-23 PROCEDURE — 87040 BLOOD CULTURE FOR BACTERIA: CPT

## 2024-12-23 PROCEDURE — 36415 COLL VENOUS BLD VENIPUNCTURE: CPT

## 2024-12-23 PROCEDURE — 96367 TX/PROPH/DG ADDL SEQ IV INF: CPT

## 2024-12-23 PROCEDURE — 6360000002 HC RX W HCPCS: Performed by: EMERGENCY MEDICINE

## 2024-12-23 PROCEDURE — 96375 TX/PRO/DX INJ NEW DRUG ADDON: CPT

## 2024-12-23 PROCEDURE — 6360000004 HC RX CONTRAST MEDICATION: Performed by: RADIOLOGY

## 2024-12-23 PROCEDURE — 2580000003 HC RX 258: Performed by: EMERGENCY MEDICINE

## 2024-12-23 PROCEDURE — 85025 COMPLETE CBC W/AUTO DIFF WBC: CPT

## 2024-12-23 RX ORDER — IOPAMIDOL 755 MG/ML
75 INJECTION, SOLUTION INTRAVASCULAR
Status: COMPLETED | OUTPATIENT
Start: 2024-12-23 | End: 2024-12-23

## 2024-12-23 RX ORDER — 0.9 % SODIUM CHLORIDE 0.9 %
1000 INTRAVENOUS SOLUTION INTRAVENOUS ONCE
Status: COMPLETED | OUTPATIENT
Start: 2024-12-23 | End: 2024-12-23

## 2024-12-23 RX ORDER — 0.9 % SODIUM CHLORIDE 0.9 %
1000 INTRAVENOUS SOLUTION INTRAVENOUS ONCE
Status: COMPLETED | OUTPATIENT
Start: 2024-12-23 | End: 2024-12-24

## 2024-12-23 RX ORDER — MORPHINE SULFATE 8 MG/ML
6 INJECTION, SOLUTION INTRAMUSCULAR; INTRAVENOUS ONCE
Status: COMPLETED | OUTPATIENT
Start: 2024-12-23 | End: 2024-12-23

## 2024-12-23 RX ORDER — 0.9 % SODIUM CHLORIDE 0.9 %
500 INTRAVENOUS SOLUTION INTRAVENOUS ONCE
Status: COMPLETED | OUTPATIENT
Start: 2024-12-24 | End: 2024-12-24

## 2024-12-23 RX ORDER — ONDANSETRON 2 MG/ML
4 INJECTION INTRAMUSCULAR; INTRAVENOUS ONCE
Status: COMPLETED | OUTPATIENT
Start: 2024-12-23 | End: 2024-12-23

## 2024-12-23 RX ORDER — 0.9 % SODIUM CHLORIDE 0.9 %
500 INTRAVENOUS SOLUTION INTRAVENOUS ONCE
Status: COMPLETED | OUTPATIENT
Start: 2024-12-23 | End: 2024-12-23

## 2024-12-23 RX ORDER — LORAZEPAM 2 MG/ML
1 INJECTION INTRAMUSCULAR ONCE
Status: COMPLETED | OUTPATIENT
Start: 2024-12-23 | End: 2024-12-23

## 2024-12-23 RX ORDER — FENTANYL CITRATE 50 UG/ML
50 INJECTION, SOLUTION INTRAMUSCULAR; INTRAVENOUS ONCE
Status: COMPLETED | OUTPATIENT
Start: 2024-12-23 | End: 2024-12-23

## 2024-12-23 RX ADMIN — ONDANSETRON 4 MG: 2 INJECTION, SOLUTION INTRAMUSCULAR; INTRAVENOUS at 21:02

## 2024-12-23 RX ADMIN — IOPAMIDOL 75 ML: 755 INJECTION, SOLUTION INTRAVENOUS at 17:32

## 2024-12-23 RX ADMIN — LORAZEPAM 1 MG: 2 INJECTION INTRAMUSCULAR; INTRAVENOUS at 17:30

## 2024-12-23 RX ADMIN — SODIUM CHLORIDE 500 ML: 9 INJECTION, SOLUTION INTRAVENOUS at 20:53

## 2024-12-23 RX ADMIN — SODIUM CHLORIDE 1000 ML: 9 INJECTION, SOLUTION INTRAVENOUS at 22:11

## 2024-12-23 RX ADMIN — MORPHINE SULFATE 6 MG: 8 INJECTION, SOLUTION INTRAMUSCULAR; INTRAVENOUS at 16:00

## 2024-12-23 RX ADMIN — ONDANSETRON 4 MG: 2 INJECTION, SOLUTION INTRAMUSCULAR; INTRAVENOUS at 16:00

## 2024-12-23 RX ADMIN — PIPERACILLIN AND TAZOBACTAM 4500 MG: 4; .5 INJECTION, POWDER, FOR SOLUTION INTRAVENOUS at 20:57

## 2024-12-23 RX ADMIN — VANCOMYCIN HYDROCHLORIDE 1500 MG: 10 INJECTION, POWDER, LYOPHILIZED, FOR SOLUTION INTRAVENOUS at 22:08

## 2024-12-23 RX ADMIN — SODIUM CHLORIDE 1000 ML: 9 INJECTION, SOLUTION INTRAVENOUS at 16:02

## 2024-12-23 RX ADMIN — FENTANYL CITRATE 50 MCG: 50 INJECTION INTRAMUSCULAR; INTRAVENOUS at 21:03

## 2024-12-23 RX ADMIN — SODIUM CHLORIDE 500 ML: 9 INJECTION, SOLUTION INTRAVENOUS at 23:47

## 2024-12-23 ASSESSMENT — PAIN DESCRIPTION - LOCATION
LOCATION: ABDOMEN
LOCATION: ABDOMEN

## 2024-12-23 ASSESSMENT — PAIN DESCRIPTION - ONSET: ONSET: ON-GOING

## 2024-12-23 ASSESSMENT — PAIN SCALES - GENERAL
PAINLEVEL_OUTOF10: 10
PAINLEVEL_OUTOF10: 10

## 2024-12-23 ASSESSMENT — PAIN DESCRIPTION - DESCRIPTORS
DESCRIPTORS: ACHING
DESCRIPTORS: SHARP

## 2024-12-23 ASSESSMENT — PAIN DESCRIPTION - FREQUENCY: FREQUENCY: CONTINUOUS

## 2024-12-23 ASSESSMENT — PAIN DESCRIPTION - PAIN TYPE: TYPE: ACUTE PAIN

## 2024-12-23 ASSESSMENT — PAIN - FUNCTIONAL ASSESSMENT
PAIN_FUNCTIONAL_ASSESSMENT: 0-10
PAIN_FUNCTIONAL_ASSESSMENT: ACTIVITIES ARE NOT PREVENTED

## 2024-12-23 NOTE — ED PROVIDER NOTES
Oropharynx clear, handling secretions, no trismus, no asymmetry of the posterior oropharynx or uvular edema  Neck: Supple, full ROM, non tender to palpation in the midline, no stridor, no crepitus, no meningeal signs  Respiratory: Lungs clear to auscultation bilaterally, no wheezes, rales, or rhonchi. Not in respiratory distress  Cardiovascular:  Regular rate. Regular rhythm. No murmurs, gallops, or rubs. 2+ distal pulses  Chest: No chest wall tenderness  GI:  Abdomen Soft, diffusely tender.  Old incisions with scarring.  Erythema surrounding these incisions.  Drainage from central system is yellow in color.  Does have ostomy over left sided fistula.  With yellow drainage no guarding rebound  Musculoskeletal: Moves all extremities x 4. Warm and well perfused, no clubbing, cyanosis, or edema. Capillary refill <3 seconds  Integument: skin warm and dry. No rashes.   Lymphatic: no lymphadenopathy noted  Neurologic: GCS 15, no focal deficits, symmetric strength 5/5 in the upper and lower extremities bilaterally  Psychiatric: Normal Affect      Medical Decision Making:    Complex medical history Short gut syndrome on TPN via [facility] by bowel transplant team.  Consideration perforation/fistula/abscess/diverticulitis/Durand Mounce anemia/anxiety.  Results noted.  Questional new abscess versus fistula.  Discussed with patient surgeon here Dr. Hunt.  We are unable to care for it here.  Recommended transfer clinically.  Discussed with surgeon at [facility] Dr. Kilgore.  Bowel surgery not immediately available.  Recommend Mitt here for IV antibiotics and restart from morning.  Reconsulted Dr. Hunt.  Did not feel that this plan was appropriate with this patient's medical history.  States he is a high rate of decompensation.  Recommended ER to ER transfer.  Reconsulted surgeon at [facility].  Excepted to ER.  Excepted by Dr. Babb ER physician at [facility]

## 2024-12-24 PROCEDURE — 96375 TX/PRO/DX INJ NEW DRUG ADDON: CPT

## 2024-12-24 PROCEDURE — 6360000002 HC RX W HCPCS: Performed by: EMERGENCY MEDICINE

## 2024-12-24 RX ORDER — FENTANYL CITRATE 50 UG/ML
25 INJECTION, SOLUTION INTRAMUSCULAR; INTRAVENOUS ONCE
Status: COMPLETED | OUTPATIENT
Start: 2024-12-24 | End: 2024-12-24

## 2024-12-24 RX ADMIN — FENTANYL CITRATE 25 MCG: 50 INJECTION INTRAMUSCULAR; INTRAVENOUS at 00:05

## 2024-12-24 ASSESSMENT — PAIN DESCRIPTION - DESCRIPTORS: DESCRIPTORS: SHARP

## 2024-12-24 ASSESSMENT — PAIN SCALES - GENERAL: PAINLEVEL_OUTOF10: 9

## 2024-12-24 ASSESSMENT — PAIN DESCRIPTION - LOCATION: LOCATION: ABDOMEN;BACK

## 2024-12-26 LAB
ACB COMPLEX DNA BLD POS QL NAA+NON-PROBE: NOT DETECTED
B FRAGILIS DNA BLD POS QL NAA+NON-PROBE: NOT DETECTED
BIOFIRE TEST COMMENT: ABNORMAL
C ALBICANS DNA BLD POS QL NAA+NON-PROBE: NOT DETECTED
C AURIS DNA BLD POS QL NAA+NON-PROBE: NOT DETECTED
C GATTII+NEOFOR DNA BLD POS QL NAA+N-PRB: NOT DETECTED
C GLABRATA DNA BLD POS QL NAA+NON-PROBE: NOT DETECTED
C KRUSEI DNA BLD POS QL NAA+NON-PROBE: NOT DETECTED
C PARAP DNA BLD POS QL NAA+NON-PROBE: NOT DETECTED
C TROPICLS DNA BLD POS QL NAA+NON-PROBE: NOT DETECTED
E CLOAC COMP DNA BLD POS NAA+NON-PROBE: NOT DETECTED
E COLI DNA BLD POS QL NAA+NON-PROBE: NOT DETECTED
E FAECALIS DNA BLD POS QL NAA+NON-PROBE: NOT DETECTED
E FAECIUM DNA BLD POS QL NAA+NON-PROBE: NOT DETECTED
ENTEROBACTERALES DNA BLD POS NAA+N-PRB: NOT DETECTED
GP B STREP DNA BLD POS QL NAA+NON-PROBE: NOT DETECTED
HAEM INFLU DNA BLD POS QL NAA+NON-PROBE: NOT DETECTED
K OXYTOCA DNA BLD POS QL NAA+NON-PROBE: NOT DETECTED
KLEBSIELLA SP DNA BLD POS QL NAA+NON-PRB: NOT DETECTED
KLEBSIELLA SP DNA BLD POS QL NAA+NON-PRB: NOT DETECTED
L MONOCYTOG DNA BLD POS QL NAA+NON-PROBE: NOT DETECTED
MECA+MECC ISLT/SPM QL: DETECTED
MICROORGANISM SPEC CULT: ABNORMAL
MICROORGANISM SPEC CULT: ABNORMAL
MICROORGANISM/AGENT SPEC: ABNORMAL
MICROORGANISM/AGENT SPEC: ABNORMAL
N MEN DNA BLD POS QL NAA+NON-PROBE: NOT DETECTED
P AERUGINOSA DNA BLD POS NAA+NON-PROBE: NOT DETECTED
PROTEUS SP DNA BLD POS QL NAA+NON-PROBE: NOT DETECTED
S AUREUS DNA BLD POS QL NAA+NON-PROBE: NOT DETECTED
S AUREUS+CONS DNA BLD POS NAA+NON-PROBE: DETECTED
S EPIDERMIDIS DNA BLD POS QL NAA+NON-PRB: DETECTED
S LUGDUNENSIS DNA BLD POS QL NAA+NON-PRB: NOT DETECTED
S MALTOPHILIA DNA BLD POS QL NAA+NON-PRB: NOT DETECTED
S MARCESCENS DNA BLD POS NAA+NON-PROBE: NOT DETECTED
S PNEUM DNA BLD POS QL NAA+NON-PROBE: NOT DETECTED
S PYO DNA BLD POS QL NAA+NON-PROBE: NOT DETECTED
SALMONELLA DNA BLD POS QL NAA+NON-PROBE: NOT DETECTED
SERVICE CMNT-IMP: ABNORMAL
SERVICE CMNT-IMP: ABNORMAL
SPECIMEN DESCRIPTION: ABNORMAL
SPECIMEN DESCRIPTION: ABNORMAL
STREPTOCOCCUS DNA BLD POS NAA+NON-PROBE: NOT DETECTED

## 2024-12-27 LAB
MICROORGANISM SPEC CULT: ABNORMAL
SERVICE CMNT-IMP: ABNORMAL
SPECIMEN DESCRIPTION: ABNORMAL

## 2025-01-05 LAB
ALBUMIN SERPL-MCNC: 3.5 G/DL (ref 3.5–5.2)
ALP SERPL-CCNC: 106 U/L (ref 35–104)
ALT SERPL-CCNC: 10 U/L (ref 0–32)
ANION GAP SERPL CALCULATED.3IONS-SCNC: 11 MMOL/L (ref 7–16)
AST SERPL-CCNC: 14 U/L (ref 0–31)
BILIRUB SERPL-MCNC: 0.3 MG/DL (ref 0–1.2)
BUN SERPL-MCNC: 38 MG/DL (ref 6–20)
CALCIUM SERPL-MCNC: 9.8 MG/DL (ref 8.6–10.2)
CHLORIDE SERPL-SCNC: 98 MMOL/L (ref 98–107)
CO2 SERPL-SCNC: 25 MMOL/L (ref 22–29)
CREAT SERPL-MCNC: 2.6 MG/DL (ref 0.5–1)
ERYTHROCYTE [DISTWIDTH] IN BLOOD BY AUTOMATED COUNT: 14.7 % (ref 11.5–15)
GFR, ESTIMATED: 21 ML/MIN/1.73M2
GLUCOSE SERPL-MCNC: 104 MG/DL (ref 74–99)
HCT VFR BLD AUTO: 28.7 % (ref 34–48)
HGB BLD-MCNC: 9.2 G/DL (ref 11.5–15.5)
MAGNESIUM SERPL-MCNC: 2.1 MG/DL (ref 1.6–2.6)
MCH RBC QN AUTO: 28 PG (ref 26–35)
MCHC RBC AUTO-ENTMCNC: 32.1 G/DL (ref 32–34.5)
MCV RBC AUTO: 87.2 FL (ref 80–99.9)
PHOSPHATE SERPL-MCNC: 3.5 MG/DL (ref 2.5–4.5)
PLATELET # BLD AUTO: 326 K/UL (ref 130–450)
PMV BLD AUTO: 9.1 FL (ref 7–12)
POTASSIUM SERPL-SCNC: 4.2 MMOL/L (ref 3.5–5)
PROT SERPL-MCNC: 7.3 G/DL (ref 6.4–8.3)
RBC # BLD AUTO: 3.29 M/UL (ref 3.5–5.5)
SODIUM SERPL-SCNC: 134 MMOL/L (ref 132–146)
TRIGL SERPL-MCNC: 164 MG/DL
WBC OTHER # BLD: 6.4 K/UL (ref 4.5–11.5)

## 2025-01-16 LAB
ALBUMIN SERPL-MCNC: 3.7 G/DL (ref 3.5–5.2)
ALP SERPL-CCNC: 131 U/L (ref 35–104)
ALT SERPL-CCNC: 13 U/L (ref 0–32)
ANION GAP SERPL CALCULATED.3IONS-SCNC: 13 MMOL/L (ref 7–16)
AST SERPL-CCNC: 20 U/L (ref 0–31)
BASOPHILS # BLD: 0.02 K/UL (ref 0–0.2)
BASOPHILS NFR BLD: 0 % (ref 0–2)
BILIRUB SERPL-MCNC: <0.2 MG/DL (ref 0–1.2)
BUN SERPL-MCNC: 34 MG/DL (ref 6–20)
CALCIUM SERPL-MCNC: 9.5 MG/DL (ref 8.6–10.2)
CHLORIDE SERPL-SCNC: 99 MMOL/L (ref 98–107)
CO2 SERPL-SCNC: 25 MMOL/L (ref 22–29)
CREAT SERPL-MCNC: 1.3 MG/DL (ref 0.5–1)
EOSINOPHIL # BLD: 0.27 K/UL (ref 0.05–0.5)
EOSINOPHILS RELATIVE PERCENT: 4 % (ref 0–6)
ERYTHROCYTE [DISTWIDTH] IN BLOOD BY AUTOMATED COUNT: 14.6 % (ref 11.5–15)
GFR, ESTIMATED: 50 ML/MIN/1.73M2
GLUCOSE SERPL-MCNC: 132 MG/DL (ref 74–99)
HCT VFR BLD AUTO: 27 % (ref 34–48)
HGB BLD-MCNC: 8.8 G/DL (ref 11.5–15.5)
IMM GRANULOCYTES # BLD AUTO: <0.03 K/UL (ref 0–0.58)
IMM GRANULOCYTES NFR BLD: 0 % (ref 0–5)
LYMPHOCYTES NFR BLD: 2.29 K/UL (ref 1.5–4)
LYMPHOCYTES RELATIVE PERCENT: 33 % (ref 20–42)
MAGNESIUM SERPL-MCNC: 1.9 MG/DL (ref 1.6–2.6)
MCH RBC QN AUTO: 28.4 PG (ref 26–35)
MCHC RBC AUTO-ENTMCNC: 32.6 G/DL (ref 32–34.5)
MCV RBC AUTO: 87.1 FL (ref 80–99.9)
MONOCYTES NFR BLD: 0.68 K/UL (ref 0.1–0.95)
MONOCYTES NFR BLD: 10 % (ref 2–12)
NEUTROPHILS NFR BLD: 53 % (ref 43–80)
NEUTS SEG NFR BLD: 3.66 K/UL (ref 1.8–7.3)
PHOSPHATE SERPL-MCNC: 3.1 MG/DL (ref 2.5–4.5)
PLATELET # BLD AUTO: 313 K/UL (ref 130–450)
PMV BLD AUTO: 9 FL (ref 7–12)
POTASSIUM SERPL-SCNC: 4 MMOL/L (ref 3.5–5)
PROT SERPL-MCNC: 7.1 G/DL (ref 6.4–8.3)
RBC # BLD AUTO: 3.1 M/UL (ref 3.5–5.5)
SODIUM SERPL-SCNC: 137 MMOL/L (ref 132–146)
TRIGL SERPL-MCNC: 116 MG/DL
WBC OTHER # BLD: 6.9 K/UL (ref 4.5–11.5)

## 2025-01-30 LAB
ALBUMIN SERPL-MCNC: 3.7 G/DL (ref 3.5–5.2)
ALP SERPL-CCNC: 122 U/L (ref 35–104)
ALT SERPL-CCNC: 15 U/L (ref 0–32)
ANION GAP SERPL CALCULATED.3IONS-SCNC: 13 MMOL/L (ref 7–16)
AST SERPL-CCNC: 17 U/L (ref 0–31)
BASOPHILS # BLD: 0.04 K/UL (ref 0–0.2)
BASOPHILS NFR BLD: 1 % (ref 0–2)
BILIRUB SERPL-MCNC: 0.2 MG/DL (ref 0–1.2)
BUN SERPL-MCNC: 27 MG/DL (ref 6–20)
CALCIUM SERPL-MCNC: 9 MG/DL (ref 8.6–10.2)
CHLORIDE SERPL-SCNC: 106 MMOL/L (ref 98–107)
CO2 SERPL-SCNC: 21 MMOL/L (ref 22–29)
CREAT SERPL-MCNC: 0.9 MG/DL (ref 0.5–1)
EOSINOPHIL # BLD: 0.37 K/UL (ref 0.05–0.5)
EOSINOPHILS RELATIVE PERCENT: 4 % (ref 0–6)
ERYTHROCYTE [DISTWIDTH] IN BLOOD BY AUTOMATED COUNT: 14.8 % (ref 11.5–15)
GFR, ESTIMATED: 74 ML/MIN/1.73M2
GLUCOSE SERPL-MCNC: 121 MG/DL (ref 74–99)
HCT VFR BLD AUTO: 30.7 % (ref 34–48)
HGB BLD-MCNC: 9.6 G/DL (ref 11.5–15.5)
IMM GRANULOCYTES # BLD AUTO: 0.03 K/UL (ref 0–0.58)
IMM GRANULOCYTES NFR BLD: 0 % (ref 0–5)
LYMPHOCYTES NFR BLD: 2.66 K/UL (ref 1.5–4)
LYMPHOCYTES RELATIVE PERCENT: 31 % (ref 20–42)
MAGNESIUM SERPL-MCNC: 2 MG/DL (ref 1.6–2.6)
MCH RBC QN AUTO: 27.3 PG (ref 26–35)
MCHC RBC AUTO-ENTMCNC: 31.3 G/DL (ref 32–34.5)
MCV RBC AUTO: 87.2 FL (ref 80–99.9)
MONOCYTES NFR BLD: 0.74 K/UL (ref 0.1–0.95)
MONOCYTES NFR BLD: 9 % (ref 2–12)
NEUTROPHILS NFR BLD: 55 % (ref 43–80)
NEUTS SEG NFR BLD: 4.67 K/UL (ref 1.8–7.3)
PHOSPHATE SERPL-MCNC: 2.9 MG/DL (ref 2.5–4.5)
PLATELET # BLD AUTO: 314 K/UL (ref 130–450)
PMV BLD AUTO: 9.4 FL (ref 7–12)
POTASSIUM SERPL-SCNC: 4.4 MMOL/L (ref 3.5–5)
PROT SERPL-MCNC: 6.8 G/DL (ref 6.4–8.3)
RBC # BLD AUTO: 3.52 M/UL (ref 3.5–5.5)
SODIUM SERPL-SCNC: 140 MMOL/L (ref 132–146)
TRIGL SERPL-MCNC: 225 MG/DL
WBC OTHER # BLD: 8.5 K/UL (ref 4.5–11.5)

## 2025-02-12 ENCOUNTER — OFFICE VISIT (OUTPATIENT)
Dept: GASTROENTEROLOGY | Age: 60
End: 2025-02-12
Payer: MEDICARE

## 2025-02-12 VITALS
HEIGHT: 61 IN | SYSTOLIC BLOOD PRESSURE: 112 MMHG | OXYGEN SATURATION: 96 % | BODY MASS INDEX: 25.51 KG/M2 | DIASTOLIC BLOOD PRESSURE: 58 MMHG | TEMPERATURE: 97 F | HEART RATE: 87 BPM

## 2025-02-12 DIAGNOSIS — K50.012 CROHN'S DISEASE OF SMALL INTESTINE WITH INTESTINAL OBSTRUCTION (HCC): Primary | ICD-10-CM

## 2025-02-12 DIAGNOSIS — K63.2 ENTEROCUTANEOUS FISTULA: ICD-10-CM

## 2025-02-12 PROCEDURE — 3074F SYST BP LT 130 MM HG: CPT | Performed by: STUDENT IN AN ORGANIZED HEALTH CARE EDUCATION/TRAINING PROGRAM

## 2025-02-12 PROCEDURE — G8427 DOCREV CUR MEDS BY ELIG CLIN: HCPCS | Performed by: STUDENT IN AN ORGANIZED HEALTH CARE EDUCATION/TRAINING PROGRAM

## 2025-02-12 PROCEDURE — 3017F COLORECTAL CA SCREEN DOC REV: CPT | Performed by: STUDENT IN AN ORGANIZED HEALTH CARE EDUCATION/TRAINING PROGRAM

## 2025-02-12 PROCEDURE — G8419 CALC BMI OUT NRM PARAM NOF/U: HCPCS | Performed by: STUDENT IN AN ORGANIZED HEALTH CARE EDUCATION/TRAINING PROGRAM

## 2025-02-12 PROCEDURE — 3078F DIAST BP <80 MM HG: CPT | Performed by: STUDENT IN AN ORGANIZED HEALTH CARE EDUCATION/TRAINING PROGRAM

## 2025-02-12 PROCEDURE — 4004F PT TOBACCO SCREEN RCVD TLK: CPT | Performed by: STUDENT IN AN ORGANIZED HEALTH CARE EDUCATION/TRAINING PROGRAM

## 2025-02-12 PROCEDURE — 99213 OFFICE O/P EST LOW 20 MIN: CPT | Performed by: STUDENT IN AN ORGANIZED HEALTH CARE EDUCATION/TRAINING PROGRAM

## 2025-02-12 RX ORDER — OXYCODONE HYDROCHLORIDE 5 MG/1
TABLET ORAL EVERY 4 HOURS PRN
COMMUNITY
Start: 2025-01-16

## 2025-02-12 NOTE — PROGRESS NOTES
Gastroenterology, Hepatology, &  Advanced Endoscopy    Consult Note      Reason for Consult: Chron's disease    HPI:   Maryam Santana is a 60 y.o. female w/ PMH of  has a past medical history of Chron's disease, Colorectal Cancer s/p sigmoidoscopy c/b incisional hernia s/p mesh repair (HCC), transverse colostomy, hysterectomy,  Depression, Fissure, anal Hernia, and Hypertension who presents to the clinic for follow up for Chron's disease.       Going for fistula repair on Feb 20    Remains on TPN.  She is no longer taking the Humira in the setting of wound healing.                     Lab Results   Component Value Date    INR 1.3 12/23/2024    INR 1.1 03/08/2024    INR 1.2 12/29/2023    PROTIME 14.1 (H) 12/23/2024    PROTIME 12.6 (H) 03/08/2024    PROTIME 13.7 (H) 12/29/2023         ALT   Date Value Ref Range Status   01/30/2025 15 0 - 32 U/L Final   01/16/2025 13 0 - 32 U/L Final   01/05/2025 10 0 - 32 U/L Final     AST   Date Value Ref Range Status   01/30/2025 17 0 - 31 U/L Final   01/16/2025 20 0 - 31 U/L Final   01/05/2025 14 0 - 31 U/L Final     Alkaline Phosphatase   Date Value Ref Range Status   01/30/2025 122 (H) 35 - 104 U/L Final   01/16/2025 131 (H) 35 - 104 U/L Final   01/05/2025 106 (H) 35 - 104 U/L Final     Total Bilirubin   Date Value Ref Range Status   01/30/2025 0.2 0.0 - 1.2 mg/dL Final   01/16/2025 <0.2 0.0 - 1.2 mg/dL Final   01/05/2025 0.3 0.0 - 1.2 mg/dL Final     Bilirubin, Direct   Date Value Ref Range Status   08/28/2024 <0.2 0.0 - 0.3 mg/dL Final   08/27/2024 <0.2 0.0 - 0.3 mg/dL Final   08/26/2024 <0.2 0.0 - 0.3 mg/dL Final      Lab Results   Component Value Date    WBC 8.5 01/30/2025    HGB 9.6 (L) 01/30/2025    HCT 30.7 (L) 01/30/2025     01/30/2025     01/30/2025    K 4.4 01/30/2025     01/30/2025    CREATININE 0.9 01/30/2025    BUN 27 (H) 01/30/2025    CO2 21 (L) 01/30/2025    [id number] 690 12/02/2024    GLUCOSE 121 (H) 01/30/2025    INR 1.3 12/23/2024    PROTIME

## 2025-02-13 LAB
ALBUMIN SERPL-MCNC: 3.6 G/DL (ref 3.5–5.2)
ALP SERPL-CCNC: 143 U/L (ref 35–104)
ALT SERPL-CCNC: 18 U/L (ref 0–32)
ANION GAP SERPL CALCULATED.3IONS-SCNC: 13 MMOL/L (ref 7–16)
AST SERPL-CCNC: 21 U/L (ref 0–31)
BASOPHILS # BLD: 0.02 K/UL (ref 0–0.2)
BASOPHILS NFR BLD: 0 % (ref 0–2)
BILIRUB SERPL-MCNC: 0.3 MG/DL (ref 0–1.2)
BUN SERPL-MCNC: 25 MG/DL (ref 6–20)
CALCIUM SERPL-MCNC: 8.7 MG/DL (ref 8.6–10.2)
CHLORIDE SERPL-SCNC: 99 MMOL/L (ref 98–107)
CO2 SERPL-SCNC: 22 MMOL/L (ref 22–29)
CREAT SERPL-MCNC: 1 MG/DL (ref 0.5–1)
EOSINOPHIL # BLD: 0.14 K/UL (ref 0.05–0.5)
EOSINOPHILS RELATIVE PERCENT: 2 % (ref 0–6)
ERYTHROCYTE [DISTWIDTH] IN BLOOD BY AUTOMATED COUNT: 14.9 % (ref 11.5–15)
GFR, ESTIMATED: 67 ML/MIN/1.73M2
GLUCOSE SERPL-MCNC: 149 MG/DL (ref 74–99)
HCT VFR BLD AUTO: 31.9 % (ref 34–48)
HGB BLD-MCNC: 10.1 G/DL (ref 11.5–15.5)
IMM GRANULOCYTES # BLD AUTO: 0.03 K/UL (ref 0–0.58)
IMM GRANULOCYTES NFR BLD: 1 % (ref 0–5)
LYMPHOCYTES NFR BLD: 1.69 K/UL (ref 1.5–4)
LYMPHOCYTES RELATIVE PERCENT: 28 % (ref 20–42)
MAGNESIUM SERPL-MCNC: 2 MG/DL (ref 1.6–2.6)
MCH RBC QN AUTO: 27.8 PG (ref 26–35)
MCHC RBC AUTO-ENTMCNC: 31.7 G/DL (ref 32–34.5)
MCV RBC AUTO: 87.9 FL (ref 80–99.9)
MONOCYTES NFR BLD: 0.63 K/UL (ref 0.1–0.95)
MONOCYTES NFR BLD: 10 % (ref 2–12)
NEUTROPHILS NFR BLD: 59 % (ref 43–80)
NEUTS SEG NFR BLD: 3.61 K/UL (ref 1.8–7.3)
PHOSPHATE SERPL-MCNC: 2.8 MG/DL (ref 2.5–4.5)
PLATELET # BLD AUTO: 227 K/UL (ref 130–450)
PMV BLD AUTO: 9.4 FL (ref 7–12)
POTASSIUM SERPL-SCNC: 4 MMOL/L (ref 3.5–5)
PROT SERPL-MCNC: 6.8 G/DL (ref 6.4–8.3)
RBC # BLD AUTO: 3.63 M/UL (ref 3.5–5.5)
SODIUM SERPL-SCNC: 134 MMOL/L (ref 132–146)
WBC OTHER # BLD: 6.1 K/UL (ref 4.5–11.5)

## 2025-02-14 LAB — TRIGL SERPL-MCNC: 224 MG/DL

## 2025-03-17 LAB
ALBUMIN SERPL-MCNC: 4.2 G/DL (ref 3.5–5.2)
ALP SERPL-CCNC: 163 U/L (ref 35–104)
ALT SERPL-CCNC: 15 U/L (ref 0–32)
ANION GAP SERPL CALCULATED.3IONS-SCNC: 22 MMOL/L (ref 7–16)
AST SERPL-CCNC: 27 U/L (ref 0–31)
BASOPHILS # BLD: 0 K/UL (ref 0–0.2)
BASOPHILS NFR BLD: 0 % (ref 0–2)
BILIRUB SERPL-MCNC: 0.4 MG/DL (ref 0–1.2)
BUN SERPL-MCNC: 26 MG/DL (ref 6–20)
CALCIUM SERPL-MCNC: 10.3 MG/DL (ref 8.6–10.2)
CHLORIDE SERPL-SCNC: 94 MMOL/L (ref 98–107)
CO2 SERPL-SCNC: 23 MMOL/L (ref 22–29)
CREAT SERPL-MCNC: 1.2 MG/DL (ref 0.5–1)
EOSINOPHIL # BLD: 0.86 K/UL (ref 0.05–0.5)
EOSINOPHILS RELATIVE PERCENT: 11 % (ref 0–6)
ERYTHROCYTE [DISTWIDTH] IN BLOOD BY AUTOMATED COUNT: 15.2 % (ref 11.5–15)
GFR, ESTIMATED: 55 ML/MIN/1.73M2
GLUCOSE SERPL-MCNC: 94 MG/DL (ref 74–99)
HCT VFR BLD AUTO: 31.1 % (ref 34–48)
HGB BLD-MCNC: 10 G/DL (ref 11.5–15.5)
LYMPHOCYTES NFR BLD: 1.32 K/UL (ref 1.5–4)
LYMPHOCYTES RELATIVE PERCENT: 17 % (ref 20–42)
MAGNESIUM SERPL-MCNC: 1.9 MG/DL (ref 1.6–2.6)
MCH RBC QN AUTO: 28.2 PG (ref 26–35)
MCHC RBC AUTO-ENTMCNC: 32.2 G/DL (ref 32–34.5)
MCV RBC AUTO: 87.9 FL (ref 80–99.9)
MONOCYTES NFR BLD: 0.26 K/UL (ref 0.1–0.95)
MONOCYTES NFR BLD: 3 % (ref 2–12)
NEUTROPHILS NFR BLD: 68 % (ref 43–80)
NEUTS SEG NFR BLD: 5.26 K/UL (ref 1.8–7.3)
NUCLEATED RED BLOOD CELLS: 1 PER 100 WBC
PHOSPHATE SERPL-MCNC: 3.4 MG/DL (ref 2.5–4.5)
PLATELET # BLD AUTO: 295 K/UL (ref 130–450)
PMV BLD AUTO: 8.5 FL (ref 7–12)
POTASSIUM SERPL-SCNC: 3.4 MMOL/L (ref 3.5–5)
PROT SERPL-MCNC: 8.6 G/DL (ref 6.4–8.3)
RBC # BLD AUTO: 3.54 M/UL (ref 3.5–5.5)
RBC # BLD: ABNORMAL 10*6/UL
SODIUM SERPL-SCNC: 139 MMOL/L (ref 132–146)
TRIGL SERPL-MCNC: 272 MG/DL
WBC OTHER # BLD: 7.7 K/UL (ref 4.5–11.5)

## 2025-03-25 LAB
HCT VFR BLD CALC: 34.8 % (ref 34–48)
HEMOGLOBIN: 11.3 G/DL (ref 11.5–15.5)
MCH RBC QN AUTO: 28.5 PG (ref 26–35)
MCHC RBC AUTO-ENTMCNC: 32.5 G/DL (ref 32–34.5)
MCV RBC AUTO: 87.9 FL (ref 80–99.9)
PDW BLD-RTO: 15.7 % (ref 11.5–15)
PLATELET # BLD: 303 K/UL (ref 130–450)
PMV BLD AUTO: 9 FL (ref 7–12)
RBC # BLD: 3.96 M/UL (ref 3.5–5.5)
WBC # BLD: 8 K/UL (ref 4.5–11.5)

## 2025-03-26 LAB
ALBUMIN: 4.5 G/DL (ref 3.5–5.2)
ALP BLD-CCNC: 141 U/L (ref 35–104)
ALT SERPL-CCNC: 18 U/L (ref 0–32)
ANION GAP SERPL CALCULATED.3IONS-SCNC: 18 MMOL/L (ref 7–16)
AST SERPL-CCNC: 29 U/L (ref 0–31)
BILIRUB SERPL-MCNC: 0.3 MG/DL (ref 0–1.2)
BUN BLDV-MCNC: 34 MG/DL (ref 6–20)
CALCIUM SERPL-MCNC: 10.3 MG/DL (ref 8.6–10.2)
CHLORIDE BLD-SCNC: 88 MMOL/L (ref 98–107)
CO2: 34 MMOL/L (ref 22–29)
CREAT SERPL-MCNC: 1 MG/DL (ref 0.5–1)
GFR, ESTIMATED: 66 ML/MIN/1.73M2
GLUCOSE BLD-MCNC: 113 MG/DL (ref 74–99)
MAGNESIUM: 1.9 MG/DL (ref 1.6–2.6)
PHOSPHORUS: 3.1 MG/DL (ref 2.5–4.5)
POTASSIUM SERPL-SCNC: 3 MMOL/L (ref 3.5–5)
SODIUM BLD-SCNC: 140 MMOL/L (ref 132–146)
TOTAL PROTEIN: 8.2 G/DL (ref 6.4–8.3)
TRIGL SERPL-MCNC: 391 MG/DL

## 2025-03-31 LAB
ALBUMIN SERPL-MCNC: 4.3 G/DL (ref 3.5–5.2)
ALP SERPL-CCNC: 127 U/L (ref 35–104)
ALT SERPL-CCNC: 18 U/L (ref 0–32)
ANION GAP SERPL CALCULATED.3IONS-SCNC: 22 MMOL/L (ref 7–16)
AST SERPL-CCNC: 33 U/L (ref 0–31)
BILIRUB SERPL-MCNC: 0.3 MG/DL (ref 0–1.2)
BUN SERPL-MCNC: 38 MG/DL (ref 6–20)
CALCIUM SERPL-MCNC: 10.2 MG/DL (ref 8.6–10.2)
CHLORIDE SERPL-SCNC: 87 MMOL/L (ref 98–107)
CO2 SERPL-SCNC: 28 MMOL/L (ref 22–29)
CREAT SERPL-MCNC: 0.9 MG/DL (ref 0.5–1)
GFR, ESTIMATED: 75 ML/MIN/1.73M2
GLUCOSE SERPL-MCNC: 235 MG/DL (ref 74–99)
MAGNESIUM SERPL-MCNC: 1.9 MG/DL (ref 1.6–2.6)
PHOSPHATE SERPL-MCNC: 2.6 MG/DL (ref 2.5–4.5)
POTASSIUM SERPL-SCNC: 2.6 MMOL/L (ref 3.5–5)
PROT SERPL-MCNC: 8.3 G/DL (ref 6.4–8.3)
SODIUM SERPL-SCNC: 137 MMOL/L (ref 132–146)
TRIGL SERPL-MCNC: 229 MG/DL

## 2025-04-07 LAB
25(OH)D3 SERPL-MCNC: 17.8 NG/ML (ref 30–100)
ALBUMIN SERPL-MCNC: 4.2 G/DL (ref 3.5–5.2)
ALP SERPL-CCNC: 120 U/L (ref 35–104)
ALT SERPL-CCNC: 19 U/L (ref 0–32)
ANION GAP SERPL CALCULATED.3IONS-SCNC: 15 MMOL/L (ref 7–16)
AST SERPL-CCNC: 31 U/L (ref 0–31)
BILIRUB SERPL-MCNC: 0.3 MG/DL (ref 0–1.2)
BUN SERPL-MCNC: 47 MG/DL (ref 6–20)
CALCIUM SERPL-MCNC: 10 MG/DL (ref 8.6–10.2)
CHLORIDE SERPL-SCNC: 91 MMOL/L (ref 98–107)
CO2 SERPL-SCNC: 32 MMOL/L (ref 22–29)
CREAT SERPL-MCNC: 1 MG/DL (ref 0.5–1)
CRP SERPL HS-MCNC: 3 MG/L (ref 0–5)
ERYTHROCYTE [DISTWIDTH] IN BLOOD BY AUTOMATED COUNT: 15 % (ref 11.5–15)
FERRITIN SERPL-MCNC: 93 NG/ML
GFR, ESTIMATED: 63 ML/MIN/1.73M2
GLUCOSE SERPL-MCNC: 127 MG/DL (ref 74–99)
HCT VFR BLD AUTO: 36.9 % (ref 34–48)
HGB BLD-MCNC: 12.1 G/DL (ref 11.5–15.5)
IRON SATN MFR SERPL: 10 % (ref 15–50)
IRON SERPL-MCNC: 39 UG/DL (ref 37–145)
MAGNESIUM SERPL-MCNC: 2.2 MG/DL (ref 1.6–2.6)
MCH RBC QN AUTO: 28.5 PG (ref 26–35)
MCHC RBC AUTO-ENTMCNC: 32.8 G/DL (ref 32–34.5)
MCV RBC AUTO: 87 FL (ref 80–99.9)
PHOSPHATE SERPL-MCNC: 3.6 MG/DL (ref 2.5–4.5)
PLATELET # BLD AUTO: 286 K/UL (ref 130–450)
PMV BLD AUTO: 9.5 FL (ref 7–12)
POTASSIUM SERPL-SCNC: 3.1 MMOL/L (ref 3.5–5)
PROT SERPL-MCNC: 7.9 G/DL (ref 6.4–8.3)
RBC # BLD AUTO: 4.24 M/UL (ref 3.5–5.5)
SODIUM SERPL-SCNC: 138 MMOL/L (ref 132–146)
TIBC SERPL-MCNC: 410 UG/DL (ref 250–450)
TRIGL SERPL-MCNC: 234 MG/DL
VIT B12 SERPL-MCNC: 883 PG/ML (ref 211–946)
WBC OTHER # BLD: 6.1 K/UL (ref 4.5–11.5)

## 2025-04-09 LAB
COPPER SERPL-MCNC: 116.9 UG/DL (ref 80–155)
SELENIUM SERPL-MCNC: 135.5 UG/L (ref 23–190)
ZINC SERPL-MCNC: 82.1 UG/DL (ref 60–120)

## 2025-04-10 LAB — METHYLMALONATE SERPL-SCNC: 0.33 UMOL/L (ref 0–0.4)

## 2025-04-21 LAB
ALBUMIN SERPL-MCNC: 4.1 G/DL (ref 3.5–5.2)
ALP SERPL-CCNC: 112 U/L (ref 35–104)
ALT SERPL-CCNC: 16 U/L (ref 0–35)
ANION GAP SERPL CALCULATED.3IONS-SCNC: 16 MMOL/L (ref 7–16)
AST SERPL-CCNC: 40 U/L (ref 0–35)
BASOPHILS # BLD: 0.03 K/UL (ref 0–0.2)
BASOPHILS NFR BLD: 1 % (ref 0–2)
BILIRUB SERPL-MCNC: 0.3 MG/DL (ref 0–1.2)
BUN SERPL-MCNC: 43 MG/DL (ref 6–20)
CALCIUM SERPL-MCNC: 9.9 MG/DL (ref 8.6–10)
CHLORIDE SERPL-SCNC: 90 MMOL/L (ref 98–107)
CO2 SERPL-SCNC: 31 MMOL/L (ref 22–29)
CREAT SERPL-MCNC: 1 MG/DL (ref 0.5–1)
EOSINOPHIL # BLD: 0.25 K/UL (ref 0.05–0.5)
EOSINOPHILS RELATIVE PERCENT: 4 % (ref 0–6)
ERYTHROCYTE [DISTWIDTH] IN BLOOD BY AUTOMATED COUNT: 14.4 % (ref 11.5–15)
GFR, ESTIMATED: 65 ML/MIN/1.73M2
GLUCOSE SERPL-MCNC: 135 MG/DL (ref 74–99)
HCT VFR BLD AUTO: 37.4 % (ref 34–48)
HGB BLD-MCNC: 12.4 G/DL (ref 11.5–15.5)
IMM GRANULOCYTES # BLD AUTO: <0.03 K/UL (ref 0–0.58)
IMM GRANULOCYTES NFR BLD: 0 % (ref 0–5)
LYMPHOCYTES NFR BLD: 2.2 K/UL (ref 1.5–4)
LYMPHOCYTES RELATIVE PERCENT: 35 % (ref 20–42)
MAGNESIUM SERPL-MCNC: 2.3 MG/DL (ref 1.6–2.6)
MCH RBC QN AUTO: 28.3 PG (ref 26–35)
MCHC RBC AUTO-ENTMCNC: 33.2 G/DL (ref 32–34.5)
MCV RBC AUTO: 85.4 FL (ref 80–99.9)
MONOCYTES NFR BLD: 0.47 K/UL (ref 0.1–0.95)
MONOCYTES NFR BLD: 7 % (ref 2–12)
NEUTROPHILS NFR BLD: 53 % (ref 43–80)
NEUTS SEG NFR BLD: 3.4 K/UL (ref 1.8–7.3)
PHOSPHATE SERPL-MCNC: 3.7 MG/DL (ref 2.5–4.5)
PLATELET # BLD AUTO: 273 K/UL (ref 130–450)
PMV BLD AUTO: 9.4 FL (ref 7–12)
POTASSIUM SERPL-SCNC: 3 MMOL/L (ref 3.4–4.5)
PROT SERPL-MCNC: 7.9 G/DL (ref 6.4–8.3)
RBC # BLD AUTO: 4.38 M/UL (ref 3.5–5.5)
SODIUM SERPL-SCNC: 137 MMOL/L (ref 136–145)
TRIGL SERPL-MCNC: 185 MG/DL
WBC OTHER # BLD: 6.4 K/UL (ref 4.5–11.5)

## 2025-05-07 ENCOUNTER — OFFICE VISIT (OUTPATIENT)
Dept: GASTROENTEROLOGY | Age: 60
End: 2025-05-07
Payer: MEDICARE

## 2025-05-07 VITALS
DIASTOLIC BLOOD PRESSURE: 70 MMHG | OXYGEN SATURATION: 100 % | HEIGHT: 61 IN | HEART RATE: 96 BPM | BODY MASS INDEX: 26.06 KG/M2 | SYSTOLIC BLOOD PRESSURE: 118 MMHG | TEMPERATURE: 97 F | WEIGHT: 138 LBS

## 2025-05-07 DIAGNOSIS — K90.822 SHORT BOWEL SYNDROME WITHOUT COLON IN CONTINUITY: Primary | ICD-10-CM

## 2025-05-07 DIAGNOSIS — K50.012 CROHN'S DISEASE OF SMALL INTESTINE WITH INTESTINAL OBSTRUCTION (HCC): ICD-10-CM

## 2025-05-07 DIAGNOSIS — K63.2 ENTEROCUTANEOUS FISTULA: ICD-10-CM

## 2025-05-07 PROCEDURE — 3017F COLORECTAL CA SCREEN DOC REV: CPT | Performed by: STUDENT IN AN ORGANIZED HEALTH CARE EDUCATION/TRAINING PROGRAM

## 2025-05-07 PROCEDURE — G8419 CALC BMI OUT NRM PARAM NOF/U: HCPCS | Performed by: STUDENT IN AN ORGANIZED HEALTH CARE EDUCATION/TRAINING PROGRAM

## 2025-05-07 PROCEDURE — 99214 OFFICE O/P EST MOD 30 MIN: CPT | Performed by: STUDENT IN AN ORGANIZED HEALTH CARE EDUCATION/TRAINING PROGRAM

## 2025-05-07 PROCEDURE — 4004F PT TOBACCO SCREEN RCVD TLK: CPT | Performed by: STUDENT IN AN ORGANIZED HEALTH CARE EDUCATION/TRAINING PROGRAM

## 2025-05-07 PROCEDURE — G8427 DOCREV CUR MEDS BY ELIG CLIN: HCPCS | Performed by: STUDENT IN AN ORGANIZED HEALTH CARE EDUCATION/TRAINING PROGRAM

## 2025-05-07 PROCEDURE — 3074F SYST BP LT 130 MM HG: CPT | Performed by: STUDENT IN AN ORGANIZED HEALTH CARE EDUCATION/TRAINING PROGRAM

## 2025-05-07 PROCEDURE — 3078F DIAST BP <80 MM HG: CPT | Performed by: STUDENT IN AN ORGANIZED HEALTH CARE EDUCATION/TRAINING PROGRAM

## 2025-05-07 NOTE — PROGRESS NOTES
LABA1C 6.2 (H) 09/30/2024     MELD 3.0: 17 at 12/23/2024  4:04 PM  MELD-Na: 10 at 12/23/2024  4:04 PM  Calculated from:  Serum Creatinine: 1.1 mg/dL at 12/23/2024  4:04 PM  Serum Sodium: 130 mmol/L at 12/23/2024  4:04 PM  Total Bilirubin: 0.4 mg/dL (Using min of 1 mg/dL) at 12/23/2024  4:04 PM  Serum Albumin: 3.6 g/dL (Using max of 3.5 g/dL) at 12/23/2024  4:04 PM  INR(ratio): 1.3 at 12/23/2024  4:04 PM  Age at listing (hypothetical): 59 years  Sex: Female at 12/23/2024  4:04 PM     CEA   Date Value Ref Range Status   12/29/2023 1.6 0.0 - 5.2 ng/mL Final     Comment:     The Roche \"ECLIA\" assay is used.  Results obtained with different assay methods cannot be   used interchangeably.       Sed Rate, Automated   Date Value Ref Range Status   09/16/2024 42 (H) 0 - 20 mm/Hr Final   09/02/2024 27 (H) 0 - 20 mm/Hr Final   08/20/2024 57 (H) 0 - 20 mm/Hr Final     Lipase   Date Value Ref Range Status   12/23/2024 27 13 - 60 U/L Final   08/19/2024 28 13 - 60 U/L Final   12/28/2023 33 13 - 60 U/L Final          US Result (most recent):  US GALLBLADDER RUQ 12/28/2023    Narrative  EXAMINATION:  RIGHT UPPER QUADRANT ULTRASOUND    12/28/2023 12:05 pm    COMPARISON:  None.    HISTORY:  ORDERING SYSTEM PROVIDED HISTORY: transaminitis, elevated bilirubin  TECHNOLOGIST PROVIDED HISTORY:    Reason for exam:->transaminitis, elevated bilirubin  What reading provider will be dictating this exam?->CRC    FINDINGS:  LIVER:  The liver is mildly enlarged with normal echogenicity without  evidence of intrahepatic biliary ductal dilatation.    BILIARY SYSTEM:  Gallbladder is surgically absent.    Common bile duct is within normal limits measuring 5.3 mm.    RIGHT KIDNEY: The right kidney is grossly unremarkable without evidence of  hydronephrosis.    PANCREAS:  Visualized portions of the pancreas are unremarkable.    OTHER: No evidence of right upper quadrant ascites.    Impression  Mild hepatomegaly. Status post cholecystectomy.     CT

## 2025-05-12 LAB
ALBUMIN SERPL-MCNC: 4.2 G/DL (ref 3.5–5.2)
ALP SERPL-CCNC: 120 U/L (ref 35–104)
ALT SERPL-CCNC: 23 U/L (ref 0–35)
ANION GAP SERPL CALCULATED.3IONS-SCNC: 19 MMOL/L (ref 7–16)
AST SERPL-CCNC: 38 U/L (ref 0–35)
BASOPHILS # BLD: 0.04 K/UL (ref 0–0.2)
BASOPHILS NFR BLD: 1 % (ref 0–2)
BILIRUB SERPL-MCNC: 0.2 MG/DL (ref 0–1.2)
BUN SERPL-MCNC: 47 MG/DL (ref 6–20)
CALCIUM SERPL-MCNC: 10 MG/DL (ref 8.6–10)
CHLORIDE SERPL-SCNC: 91 MMOL/L (ref 98–107)
CO2 SERPL-SCNC: 29 MMOL/L (ref 22–29)
CREAT SERPL-MCNC: 1.5 MG/DL (ref 0.5–1)
EOSINOPHIL # BLD: 0.27 K/UL (ref 0.05–0.5)
EOSINOPHILS RELATIVE PERCENT: 4 % (ref 0–6)
ERYTHROCYTE [DISTWIDTH] IN BLOOD BY AUTOMATED COUNT: 14.1 % (ref 11.5–15)
GFR, ESTIMATED: 41 ML/MIN/1.73M2
GLUCOSE SERPL-MCNC: 205 MG/DL (ref 74–99)
HCT VFR BLD AUTO: 38.9 % (ref 34–48)
HGB BLD-MCNC: 12.8 G/DL (ref 11.5–15.5)
IMM GRANULOCYTES # BLD AUTO: <0.03 K/UL (ref 0–0.58)
IMM GRANULOCYTES NFR BLD: 0 % (ref 0–5)
LYMPHOCYTES NFR BLD: 2.45 K/UL (ref 1.5–4)
LYMPHOCYTES RELATIVE PERCENT: 38 % (ref 20–42)
MAGNESIUM SERPL-MCNC: 2.2 MG/DL (ref 1.6–2.6)
MCH RBC QN AUTO: 28.5 PG (ref 26–35)
MCHC RBC AUTO-ENTMCNC: 32.9 G/DL (ref 32–34.5)
MCV RBC AUTO: 86.6 FL (ref 80–99.9)
MONOCYTES NFR BLD: 0.49 K/UL (ref 0.1–0.95)
MONOCYTES NFR BLD: 8 % (ref 2–12)
NEUTROPHILS NFR BLD: 49 % (ref 43–80)
NEUTS SEG NFR BLD: 3.18 K/UL (ref 1.8–7.3)
PHOSPHATE SERPL-MCNC: 3.6 MG/DL (ref 2.5–4.5)
PLATELET # BLD AUTO: 281 K/UL (ref 130–450)
PMV BLD AUTO: 9.5 FL (ref 7–12)
POTASSIUM SERPL-SCNC: 2.9 MMOL/L (ref 3.5–5.1)
PROT SERPL-MCNC: 7.5 G/DL (ref 6.4–8.3)
RBC # BLD AUTO: 4.49 M/UL (ref 3.5–5.5)
SODIUM SERPL-SCNC: 140 MMOL/L (ref 136–145)
TRIGL SERPL-MCNC: 213 MG/DL
WBC OTHER # BLD: 6.4 K/UL (ref 4.5–11.5)

## 2025-05-13 LAB
ANION GAP SERPL CALCULATED.3IONS-SCNC: 13 MMOL/L (ref 7–16)
BUN SERPL-MCNC: 35 MG/DL (ref 6–20)
CALCIUM SERPL-MCNC: 9.9 MG/DL (ref 8.6–10)
CHLORIDE SERPL-SCNC: 90 MMOL/L (ref 98–107)
CO2 SERPL-SCNC: 35 MMOL/L (ref 22–29)
CREAT SERPL-MCNC: 1.4 MG/DL (ref 0.5–1)
GFR, ESTIMATED: 43 ML/MIN/1.73M2
GLUCOSE SERPL-MCNC: 152 MG/DL (ref 74–99)
POTASSIUM SERPL-SCNC: 3 MMOL/L (ref 3.5–5.1)
SODIUM SERPL-SCNC: 138 MMOL/L (ref 136–145)

## 2025-05-14 RX ORDER — COLESEVELAM 180 1/1
625 TABLET ORAL 2 TIMES DAILY WITH MEALS
Qty: 180 TABLET | Refills: 0 | Status: SHIPPED | OUTPATIENT
Start: 2025-05-14

## 2025-05-14 RX ORDER — OCTREOTIDE ACETATE 100 UG/ML
100 INJECTION, SOLUTION INTRAVENOUS; SUBCUTANEOUS EVERY 8 HOURS
Qty: 90 EACH | Refills: 11 | Status: SHIPPED | OUTPATIENT
Start: 2025-05-14

## 2025-05-14 RX ORDER — CALCIUM POLYCARBOPHIL 625 MG
625 TABLET ORAL DAILY
Qty: 14 TABLET | Refills: 0 | Status: SHIPPED | OUTPATIENT
Start: 2025-05-14

## 2025-05-19 LAB
ALBUMIN SERPL-MCNC: 4.1 G/DL (ref 3.5–5.2)
ALP SERPL-CCNC: 115 U/L (ref 35–104)
ALT SERPL-CCNC: 20 U/L (ref 0–35)
ANION GAP SERPL CALCULATED.3IONS-SCNC: 16 MMOL/L (ref 7–16)
AST SERPL-CCNC: 39 U/L (ref 0–35)
BASOPHILS # BLD: 0.04 K/UL (ref 0–0.2)
BASOPHILS NFR BLD: 1 % (ref 0–2)
BILIRUB SERPL-MCNC: 0.3 MG/DL (ref 0–1.2)
BUN SERPL-MCNC: 42 MG/DL (ref 6–20)
CALCIUM SERPL-MCNC: 10.2 MG/DL (ref 8.6–10)
CHLORIDE SERPL-SCNC: 92 MMOL/L (ref 98–107)
CO2 SERPL-SCNC: 29 MMOL/L (ref 22–29)
CREAT SERPL-MCNC: 1.5 MG/DL (ref 0.5–1)
EOSINOPHIL # BLD: 0.24 K/UL (ref 0.05–0.5)
EOSINOPHILS RELATIVE PERCENT: 3 % (ref 0–6)
ERYTHROCYTE [DISTWIDTH] IN BLOOD BY AUTOMATED COUNT: 14.1 % (ref 11.5–15)
GFR, ESTIMATED: 40 ML/MIN/1.73M2
GLUCOSE SERPL-MCNC: 121 MG/DL (ref 74–99)
HCT VFR BLD AUTO: 38 % (ref 34–48)
HGB BLD-MCNC: 12.7 G/DL (ref 11.5–15.5)
IMM GRANULOCYTES # BLD AUTO: <0.03 K/UL (ref 0–0.58)
IMM GRANULOCYTES NFR BLD: 0 % (ref 0–5)
LYMPHOCYTES NFR BLD: 2.73 K/UL (ref 1.5–4)
LYMPHOCYTES RELATIVE PERCENT: 36 % (ref 20–42)
MAGNESIUM SERPL-MCNC: 2.2 MG/DL (ref 1.6–2.6)
MCH RBC QN AUTO: 28.7 PG (ref 26–35)
MCHC RBC AUTO-ENTMCNC: 33.4 G/DL (ref 32–34.5)
MCV RBC AUTO: 86 FL (ref 80–99.9)
MONOCYTES NFR BLD: 0.54 K/UL (ref 0.1–0.95)
MONOCYTES NFR BLD: 7 % (ref 2–12)
NEUTROPHILS NFR BLD: 52 % (ref 43–80)
NEUTS SEG NFR BLD: 3.92 K/UL (ref 1.8–7.3)
PHOSPHATE SERPL-MCNC: 3.9 MG/DL (ref 2.5–4.5)
PLATELET # BLD AUTO: 289 K/UL (ref 130–450)
PMV BLD AUTO: 9.5 FL (ref 7–12)
POTASSIUM SERPL-SCNC: 3.3 MMOL/L (ref 3.5–5.1)
PROT SERPL-MCNC: 7.9 G/DL (ref 6.4–8.3)
RBC # BLD AUTO: 4.42 M/UL (ref 3.5–5.5)
RBC # BLD: NORMAL 10*6/UL
SODIUM SERPL-SCNC: 138 MMOL/L (ref 136–145)
TRIGL SERPL-MCNC: 287 MG/DL
WBC OTHER # BLD: 7.5 K/UL (ref 4.5–11.5)

## 2025-07-21 LAB
ALBUMIN SERPL-MCNC: 3.8 G/DL (ref 3.5–5.2)
ALP SERPL-CCNC: 84 U/L (ref 35–104)
ALT SERPL-CCNC: 17 U/L (ref 0–35)
ANION GAP SERPL CALCULATED.3IONS-SCNC: 16 MMOL/L (ref 7–16)
AST SERPL-CCNC: 29 U/L (ref 0–35)
BASOPHILS # BLD: 0.05 K/UL (ref 0–0.2)
BASOPHILS NFR BLD: 1 % (ref 0–2)
BILIRUB SERPL-MCNC: 0.4 MG/DL (ref 0–1.2)
BUN SERPL-MCNC: 40 MG/DL (ref 6–20)
CALCIUM SERPL-MCNC: 9.7 MG/DL (ref 8.6–10)
CHLORIDE SERPL-SCNC: 102 MMOL/L (ref 98–107)
CO2 SERPL-SCNC: 22 MMOL/L (ref 22–29)
CREAT SERPL-MCNC: 3.5 MG/DL (ref 0.5–1)
EOSINOPHIL # BLD: 0.31 K/UL (ref 0.05–0.5)
EOSINOPHILS RELATIVE PERCENT: 4 % (ref 0–6)
ERYTHROCYTE [DISTWIDTH] IN BLOOD BY AUTOMATED COUNT: 15.9 % (ref 11.5–15)
GFR, ESTIMATED: 15 ML/MIN/1.73M2
GLUCOSE SERPL-MCNC: 88 MG/DL (ref 74–99)
HCT VFR BLD AUTO: 29.2 % (ref 34–48)
HGB BLD-MCNC: 9.5 G/DL (ref 11.5–15.5)
IMM GRANULOCYTES # BLD AUTO: <0.03 K/UL (ref 0–0.58)
IMM GRANULOCYTES NFR BLD: 0 % (ref 0–5)
LYMPHOCYTES NFR BLD: 2.25 K/UL (ref 1.5–4)
LYMPHOCYTES RELATIVE PERCENT: 28 % (ref 20–42)
MAGNESIUM SERPL-MCNC: 2.3 MG/DL (ref 1.6–2.6)
MCH RBC QN AUTO: 29.1 PG (ref 26–35)
MCHC RBC AUTO-ENTMCNC: 32.5 G/DL (ref 32–34.5)
MCV RBC AUTO: 89.6 FL (ref 80–99.9)
MONOCYTES NFR BLD: 0.46 K/UL (ref 0.1–0.95)
MONOCYTES NFR BLD: 6 % (ref 2–12)
NEUTROPHILS NFR BLD: 61 % (ref 43–80)
NEUTS SEG NFR BLD: 4.83 K/UL (ref 1.8–7.3)
PLATELET # BLD AUTO: 267 K/UL (ref 130–450)
PMV BLD AUTO: 9.2 FL (ref 7–12)
POTASSIUM SERPL-SCNC: 4 MMOL/L (ref 3.5–5.1)
PROT SERPL-MCNC: 7 G/DL (ref 6.4–8.3)
RBC # BLD AUTO: 3.26 M/UL (ref 3.5–5.5)
SODIUM SERPL-SCNC: 140 MMOL/L (ref 136–145)
TRIGL SERPL-MCNC: 266 MG/DL
WBC OTHER # BLD: 7.9 K/UL (ref 4.5–11.5)

## 2025-07-23 ENCOUNTER — OFFICE VISIT (OUTPATIENT)
Dept: GASTROENTEROLOGY | Age: 60
End: 2025-07-23
Payer: MEDICARE

## 2025-07-23 VITALS
SYSTOLIC BLOOD PRESSURE: 120 MMHG | HEIGHT: 61 IN | BODY MASS INDEX: 27.08 KG/M2 | DIASTOLIC BLOOD PRESSURE: 69 MMHG | WEIGHT: 143.4 LBS | OXYGEN SATURATION: 98 % | TEMPERATURE: 97.4 F | HEART RATE: 55 BPM | RESPIRATION RATE: 18 BRPM

## 2025-07-23 DIAGNOSIS — K50.818 CROHN'S DISEASE OF BOTH SMALL AND LARGE INTESTINE WITH OTHER COMPLICATION (HCC): Primary | Chronic | ICD-10-CM

## 2025-07-23 DIAGNOSIS — K63.2 ENTEROCUTANEOUS FISTULA: ICD-10-CM

## 2025-07-23 PROCEDURE — 99214 OFFICE O/P EST MOD 30 MIN: CPT | Performed by: STUDENT IN AN ORGANIZED HEALTH CARE EDUCATION/TRAINING PROGRAM

## 2025-07-23 PROCEDURE — 3078F DIAST BP <80 MM HG: CPT | Performed by: STUDENT IN AN ORGANIZED HEALTH CARE EDUCATION/TRAINING PROGRAM

## 2025-07-23 PROCEDURE — G8427 DOCREV CUR MEDS BY ELIG CLIN: HCPCS | Performed by: STUDENT IN AN ORGANIZED HEALTH CARE EDUCATION/TRAINING PROGRAM

## 2025-07-23 PROCEDURE — 3017F COLORECTAL CA SCREEN DOC REV: CPT | Performed by: STUDENT IN AN ORGANIZED HEALTH CARE EDUCATION/TRAINING PROGRAM

## 2025-07-23 PROCEDURE — G8419 CALC BMI OUT NRM PARAM NOF/U: HCPCS | Performed by: STUDENT IN AN ORGANIZED HEALTH CARE EDUCATION/TRAINING PROGRAM

## 2025-07-23 PROCEDURE — 4004F PT TOBACCO SCREEN RCVD TLK: CPT | Performed by: STUDENT IN AN ORGANIZED HEALTH CARE EDUCATION/TRAINING PROGRAM

## 2025-07-23 PROCEDURE — 3074F SYST BP LT 130 MM HG: CPT | Performed by: STUDENT IN AN ORGANIZED HEALTH CARE EDUCATION/TRAINING PROGRAM

## 2025-07-23 NOTE — PROGRESS NOTES
Gastroenterology, Hepatology, &  Advanced Endoscopy    Progress Note        HPI:   Maryam Santana is a 60 y.o. female w/ PMH of  has a past medical history of Cancer (HCC), Depression, Fissure, anal, Hernia, and Hypertension. who presents to clinic in follow-up.    She did not have further surgery after significant surgery for enterocutaneous fistula repair.   She has been doing well.     Lab Results   Component Value Date    INR 1.3 12/23/2024    INR 1.1 03/08/2024    INR 1.2 12/29/2023    PROTIME 14.1 (H) 12/23/2024    PROTIME 12.6 (H) 03/08/2024    PROTIME 13.7 (H) 12/29/2023         ALT   Date Value Ref Range Status   07/21/2025 17 0 - 35 U/L Final   05/19/2025 20 0 - 35 U/L Final   05/12/2025 23 0 - 35 U/L Final     AST   Date Value Ref Range Status   07/21/2025 29 0 - 35 U/L Final   05/19/2025 39 (H) 0 - 35 U/L Final   05/12/2025 38 (H) 0 - 35 U/L Final     Alkaline Phosphatase   Date Value Ref Range Status   07/21/2025 84 35 - 104 U/L Final   05/19/2025 115 (H) 35 - 104 U/L Final   05/12/2025 120 (H) 35 - 104 U/L Final     Total Bilirubin   Date Value Ref Range Status   07/21/2025 0.4 0.0 - 1.2 mg/dL Final   05/19/2025 0.3 0.0 - 1.2 mg/dL Final   05/12/2025 0.2 0.0 - 1.2 mg/dL Final     Bilirubin, Direct   Date Value Ref Range Status   08/28/2024 <0.2 0.0 - 0.3 mg/dL Final   08/27/2024 <0.2 0.0 - 0.3 mg/dL Final   08/26/2024 <0.2 0.0 - 0.3 mg/dL Final      Lab Results   Component Value Date    WBC 7.9 07/21/2025    HGB 9.5 (L) 07/21/2025    HCT 29.2 (L) 07/21/2025     07/21/2025     07/21/2025    K 4.0 07/21/2025     07/21/2025    CREATININE 3.5 (H) 07/21/2025    BUN 40 (H) 07/21/2025    CO2 22 07/21/2025    [id number] 04/07/2025    GLUCOSE 88 07/21/2025    INR 1.3 12/23/2024    PROTIME 14.1 (H) 12/23/2024    TSH 1.68 09/30/2024    LABA1C 6.2 (H) 09/30/2024     MELD 3.0: 17 at 12/23/2024  4:04 PM  MELD-Na: 10 at 12/23/2024  4:04 PM  Calculated from:  Serum Creatinine: 1.1 mg/dL at

## 2025-08-31 LAB
ALBUMIN SERPL-MCNC: 3.9 G/DL (ref 3.5–5.2)
ALP SERPL-CCNC: 83 U/L (ref 35–104)
ALT SERPL-CCNC: 17 U/L (ref 0–35)
ANION GAP SERPL CALCULATED.3IONS-SCNC: 16 MMOL/L (ref 7–16)
AST SERPL-CCNC: 26 U/L (ref 0–35)
BILIRUB SERPL-MCNC: 0.2 MG/DL (ref 0–1.2)
BUN SERPL-MCNC: 37 MG/DL (ref 6–20)
CALCIUM SERPL-MCNC: 9.4 MG/DL (ref 8.6–10)
CHLORIDE SERPL-SCNC: 96 MMOL/L (ref 98–107)
CO2 SERPL-SCNC: 27 MMOL/L (ref 22–29)
CREAT SERPL-MCNC: 3.9 MG/DL (ref 0.5–1)
CRP SERPL HS-MCNC: <3 MG/L (ref 0–5)
ERYTHROCYTE [DISTWIDTH] IN BLOOD BY AUTOMATED COUNT: 13.9 % (ref 11.5–15)
FERRITIN SERPL-MCNC: 103 NG/ML
GFR, ESTIMATED: 13 ML/MIN/1.73M2
GLUCOSE SERPL-MCNC: 94 MG/DL (ref 74–99)
HCT VFR BLD AUTO: 27.5 % (ref 34–48)
HGB BLD-MCNC: 9 G/DL (ref 11.5–15.5)
IRON SATN MFR SERPL: 17 % (ref 15–50)
IRON SERPL-MCNC: 57 UG/DL (ref 37–145)
MAGNESIUM SERPL-MCNC: 2.2 MG/DL (ref 1.6–2.6)
MCH RBC QN AUTO: 30.1 PG (ref 26–35)
MCHC RBC AUTO-ENTMCNC: 32.7 G/DL (ref 32–34.5)
MCV RBC AUTO: 92 FL (ref 80–99.9)
PHOSPHATE SERPL-MCNC: 4.5 MG/DL (ref 2.5–4.5)
PLATELET # BLD AUTO: 257 K/UL (ref 130–450)
PMV BLD AUTO: 9.4 FL (ref 7–12)
POTASSIUM SERPL-SCNC: 4.1 MMOL/L (ref 3.5–5.1)
PROT SERPL-MCNC: 6.9 G/DL (ref 6.4–8.3)
RBC # BLD AUTO: 2.99 M/UL (ref 3.5–5.5)
SODIUM SERPL-SCNC: 139 MMOL/L (ref 136–145)
TIBC SERPL-MCNC: 347 UG/DL (ref 250–450)
TRIGL SERPL-MCNC: 298 MG/DL
WBC OTHER # BLD: 8 K/UL (ref 4.5–11.5)

## 2025-09-02 LAB
SEND OUT REPORT: NORMAL
TEST NAME: NORMAL

## 2025-09-04 LAB
RETINYL PALMITATE: 0.12 MG/L (ref 0–0.1)
VITAMIN A LEVEL: 1.2 MG/L (ref 0.3–1.2)
VITAMIN A, INTERP: ABNORMAL

## (undated) DEVICE — SOLUTION IRRIG 1000ML 0.9% SOD CHL USP POUR PLAS BTL

## (undated) DEVICE — GLOVE ORANGE PI 7   MSG9070

## (undated) DEVICE — SPONGE,LAP,4"X18",XR,ST,5/PK,40PK/CS: Brand: MEDLINE INDUSTRIES, INC.

## (undated) DEVICE — DECANTER: Brand: UNBRANDED

## (undated) DEVICE — BLADE,STAINLESS-STEEL,11,STRL,DISPOSABLE: Brand: MEDLINE

## (undated) DEVICE — DOUBLE BASIN SET: Brand: MEDLINE INDUSTRIES, INC.

## (undated) DEVICE — TOWEL,OR,DSP,ST,BLUE,STD,6/PK,12PK/CS: Brand: MEDLINE

## (undated) DEVICE — DRAPE,CHEST,FENES,15X10,STERIL: Brand: MEDLINE

## (undated) DEVICE — 4-PORT MANIFOLD: Brand: NEPTUNE 2

## (undated) DEVICE — ELECTRODE PT RET AD L9FT HI MOIST COND ADH HYDRGEL CORDED

## (undated) DEVICE — SYRINGE MED 10ML LUERLOCK TIP W/O SFTY DISP

## (undated) DEVICE — APPLICATOR MEDICATED 26 CC SOLUTION HI LT ORNG CHLORAPREP

## (undated) DEVICE — DRAPE C ARM W41XL74IN UNIV MOB W RUBBERBAND CLP

## (undated) DEVICE — PACK PROCEDURE SURG GEN CUST

## (undated) DEVICE — SYRINGE MED 10ML TRNSLUC BRL PLUNG BLK MRK POLYPR CTRL

## (undated) DEVICE — DRAPE,LAPAROTOMY,PCH,STERILE: Brand: MEDLINE

## (undated) DEVICE — NEEDLE HYPO 25GA L1.5IN BLU POLYPR HUB S STL REG BVL STR

## (undated) DEVICE — LIQUIBAND RAPID ADHESIVE 36/CS 0.8ML: Brand: MEDLINE

## (undated) DEVICE — BOOT,SUTURE,STANDARD,YELLOW-IN-BLUE: Brand: MEDLINE